# Patient Record
Sex: MALE | Race: WHITE | NOT HISPANIC OR LATINO | Employment: OTHER | URBAN - METROPOLITAN AREA
[De-identification: names, ages, dates, MRNs, and addresses within clinical notes are randomized per-mention and may not be internally consistent; named-entity substitution may affect disease eponyms.]

---

## 2018-06-08 RX ORDER — IODOQUINOL, HYDROCORTISONE ACETATE AND ALOE VERA LEAF 10; 20; 10 MG/G; MG/G; MG/G
GEL TOPICAL
COMMUNITY
Start: 2009-11-03 | End: 2019-03-05

## 2018-06-13 ENCOUNTER — OFFICE VISIT (OUTPATIENT)
Dept: UROLOGY | Facility: MEDICAL CENTER | Age: 73
End: 2018-06-13
Payer: MEDICARE

## 2018-06-13 VITALS
BODY MASS INDEX: 23.38 KG/M2 | HEIGHT: 77 IN | SYSTOLIC BLOOD PRESSURE: 122 MMHG | DIASTOLIC BLOOD PRESSURE: 66 MMHG | WEIGHT: 198 LBS

## 2018-06-13 DIAGNOSIS — N52.03 COMBINED ARTERIAL INSUFFICIENCY AND CORPORO-VENOUS OCCLUSIVE ERECTILE DYSFUNCTION: ICD-10-CM

## 2018-06-13 DIAGNOSIS — N40.1 BPH WITH OBSTRUCTION/LOWER URINARY TRACT SYMPTOMS: Primary | ICD-10-CM

## 2018-06-13 DIAGNOSIS — N13.8 BPH WITH OBSTRUCTION/LOWER URINARY TRACT SYMPTOMS: Primary | ICD-10-CM

## 2018-06-13 LAB
SL AMB  POCT GLUCOSE, UA: NORMAL
SL AMB LEUKOCYTE ESTERASE,UA: NORMAL
SL AMB POCT BILIRUBIN,UA: NORMAL
SL AMB POCT BLOOD,UA: NORMAL
SL AMB POCT CLARITY,UA: CLEAR
SL AMB POCT COLOR,UA: YELLOW
SL AMB POCT KETONES,UA: NORMAL
SL AMB POCT NITRITE,UA: NORMAL
SL AMB POCT PH,UA: 5.5
SL AMB POCT SPECIFIC GRAVITY,UA: 1.03
SL AMB POCT URINE PROTEIN: NORMAL
SL AMB POCT UROBILINOGEN: 0.2

## 2018-06-13 PROCEDURE — 99214 OFFICE O/P EST MOD 30 MIN: CPT | Performed by: UROLOGY

## 2018-06-13 PROCEDURE — 81003 URINALYSIS AUTO W/O SCOPE: CPT | Performed by: UROLOGY

## 2018-06-13 RX ORDER — TAMSULOSIN HYDROCHLORIDE 0.4 MG/1
0.8 CAPSULE ORAL
Qty: 180 CAPSULE | Refills: 3 | Status: SHIPPED | OUTPATIENT
Start: 2018-06-13 | End: 2019-09-06 | Stop reason: SDUPTHER

## 2018-06-13 RX ORDER — FINASTERIDE 5 MG/1
5 TABLET, FILM COATED ORAL DAILY
Qty: 90 TABLET | Refills: 3 | Status: SHIPPED | OUTPATIENT
Start: 2018-06-13 | End: 2019-09-06 | Stop reason: SDUPTHER

## 2018-06-13 NOTE — PROGRESS NOTES
IPSS Questionnaire (AUA-7): Over the past month    1)  How often have you had a sensation of not emptying your bladder completely after you finish urinating? 1 - Less than 1 time in 5   2)  How often have you had to urinate again less than two hours after you finished urinating? 0 - Not at all   3)  How often have you found you stopped and started again several times when you urinated? 0 - Not at all   4) How difficult have you found it to postpone urination? 1 - Less than 1 time in 5   5) How often have you had a weak urinary stream?  1 - Less than 1 time in 5   6) How often have you had to push or strain to begin urination? 0 - Not at all   7) How many times did you most typically get up to urinate from the time you went to bed until the time you got up in the morning?   1 - 1 time   Total Score:  4

## 2018-06-13 NOTE — PATIENT INSTRUCTIONS
Benign Prostatic Hypertrophy   WHAT YOU NEED TO KNOW:   Benign prostatic hypertrophy (BPH) is a condition that causes your prostate gland to grow larger than normal  The prostate gland is the male sex gland that produces a fluid that is part of semen  It is about the size of a walnut and it is located under the bladder  As the prostate grows, it can squeeze the urethra  This can block urine flow and cause urinary problems  DISCHARGE INSTRUCTIONS:   Medicines:   · Alpha blockers: This medicine relaxes the muscles in your prostate and bladder  It may help you urinate more easily  · 5 alpha reductase inhibitors: These medicines block the production of a hormone that causes the prostate to get larger  It may help slow the growth of the prostate or shrink the prostate  · Take your medicine as directed  Contact your healthcare provider if you think your medicine is not helping or if you have side effects  Tell him or her if you are allergic to any medicine  Keep a list of the medicines, vitamins, and herbs you take  Include the amounts, and when and why you take them  Bring the list or the pill bottles to follow-up visits  Carry your medicine list with you in case of an emergency  Follow up with your healthcare provider as directed:  Write down your questions so you remember to ask them during your visits  Manage BPH:   · Do not let your bladder get too full before you empty it  Urinate when you feel the urge  · Limit alcohol  Do not drink large amounts of any liquid at one time  · Decrease the amount of salt you eat  Examples of salty foods are chips, cured meats, and canned soups  Do not use table salt  · Healthcare providers may tell you not to eat spicy foods such as chilli peppers  This may help you find out if spicy food makes your BPH symptoms worse  · You may have sex if you feel well  Contact your healthcare provider if:   · There is a large amount of blood in your urine  · Your signs and symptoms get worse  · You have a fever  · You have questions or concerns about your condition or care  Seek care immediately if:   · You are unable to urinate  · Your bladder feels very full and painful  © 2017 [employer] Information is for End User's use only and may not be sold, redistributed or otherwise used for commercial purposes  All illustrations and images included in CareNotes® are the copyrighted property of [employer]  or [employer]  The above information is an  only  It is not intended as medical advice for individual conditions or treatments  Talk to your doctor, nurse or pharmacist before following any medical regimen to see if it is safe and effective for you

## 2018-06-13 NOTE — PROGRESS NOTES
Assessment/Plan:    Combined arterial insufficiency and corporo-venous occlusive erectile dysfunction  Patient is satisfied with the use of Viagra  BPH with obstruction/lower urinary tract symptoms  AUA symptom score is 4  Urinalysis is negative  PSA 2 52 in April, 2018  He is voiding well on combined medical therapy  His prescriptions were refilled  He will return in 1 year and we will recheck PSA at that time  Diagnoses and all orders for this visit:    BPH with obstruction/lower urinary tract symptoms  -     POCT urine dip auto non-scope  -     tamsulosin (FLOMAX) 0 4 mg; Take 2 capsules (0 8 mg total) by mouth daily with dinner  -     finasteride (PROSCAR) 5 mg tablet; Take 1 tablet (5 mg total) by mouth daily  -     PSA Total, Diagnostic; Future    Combined arterial insufficiency and corporo-venous occlusive erectile dysfunction    Other orders  -     Iodoquinol-HC-Aloe Polysacch (ALCORTIN A) 1-2-1 % GEL; Subjective:      Patient ID: Nixon Dozier  is a 68 y o  male  Benign Prostatic Hypertrophy   This is a chronic problem  The current episode started more than 1 year ago  The problem is unchanged  Irritative symptoms do not include frequency, nocturia or urgency  Obstructive symptoms do not include dribbling, incomplete emptying, an intermittent stream, a slower stream, straining or a weak stream  Pertinent negatives include no chills, dysuria, genital pain, hematuria, hesitancy, nausea or vomiting  AUA score is 0-7  He is sexually active  Nothing aggravates the symptoms  Past treatments include finasteride and tamsulosin  The treatment provided significant relief  He has been using treatment for 2 or more years         The following portions of the patient's history were reviewed and updated as appropriate: allergies, current medications, past family history, past medical history, past social history, past surgical history and problem list     Review of Systems   Constitutional: Negative for chills, diaphoresis, fatigue and fever  HENT: Negative  Eyes: Negative  Respiratory: Negative  Cardiovascular: Negative  Gastrointestinal: Negative for nausea and vomiting  Endocrine: Negative  Genitourinary: Negative for dysuria, frequency, hematuria, hesitancy, incomplete emptying, nocturia and urgency  Musculoskeletal: Negative  Skin: Negative  Allergic/Immunologic: Negative  Neurological: Negative  Hematological: Negative  Psychiatric/Behavioral: Negative  Objective:      /66 (BP Location: Left arm, Patient Position: Sitting)   Ht 6' 5" (1 956 m)   Wt 89 8 kg (198 lb)   BMI 23 48 kg/m²          Physical Exam   Constitutional: He is oriented to person, place, and time  He appears well-developed and well-nourished  HENT:   Head: Normocephalic and atraumatic  Eyes: Conjunctivae are normal    Neck: Neck supple  Cardiovascular: Normal rate  Pulmonary/Chest: Effort normal    Abdominal: Soft  Bowel sounds are normal  He exhibits no distension and no mass  There is no tenderness  There is no rebound, no guarding and no CVA tenderness  Hernia confirmed negative in the right inguinal area and confirmed negative in the left inguinal area  No hernia   Genitourinary: Rectum normal, testes normal and penis normal  Prostate is enlarged  Prostate is not tender  Right testis shows no mass  Left testis shows no mass  No phimosis or hypospadias  Genitourinary Comments: Prostate is 11/2 times enlarged and palpably benign   Musculoskeletal: He exhibits no edema  Neurological: He is alert and oriented to person, place, and time  Skin: Skin is warm and dry  Psychiatric: He has a normal mood and affect  His behavior is normal  Judgment and thought content normal    Nursing note and vitals reviewed

## 2018-06-13 NOTE — ASSESSMENT & PLAN NOTE
AUA symptom score is 4  Urinalysis is negative  PSA 2 52 in April, 2018  He is voiding well on combined medical therapy  His prescriptions were refilled  He will return in 1 year and we will recheck PSA at that time

## 2018-06-13 NOTE — LETTER
June 13, 2018     Adriana Mathew MD  [address]    Patient: Wally Zamorano  YOB: 1945   Date of Visit: 6/13/2018       Dear Dr Jessica Pena:    Thank you for referring Cedric Vargas to me for evaluation  Below are my notes for this consultation  If you have questions, please do not hesitate to call me  I look forward to following your patient along with you           Sincerely,        Obedchandana Salazar MD        CC: No Recipients

## 2019-03-05 ENCOUNTER — OFFICE VISIT (OUTPATIENT)
Dept: FAMILY MEDICINE CLINIC | Facility: CLINIC | Age: 74
End: 2019-03-05
Payer: MEDICARE

## 2019-03-05 VITALS
BODY MASS INDEX: 22.32 KG/M2 | HEIGHT: 77 IN | TEMPERATURE: 97.9 F | SYSTOLIC BLOOD PRESSURE: 146 MMHG | WEIGHT: 189 LBS | DIASTOLIC BLOOD PRESSURE: 78 MMHG | OXYGEN SATURATION: 99 % | HEART RATE: 76 BPM | RESPIRATION RATE: 18 BRPM

## 2019-03-05 DIAGNOSIS — N40.1 BPH WITH OBSTRUCTION/LOWER URINARY TRACT SYMPTOMS: Primary | ICD-10-CM

## 2019-03-05 DIAGNOSIS — N13.8 BPH WITH OBSTRUCTION/LOWER URINARY TRACT SYMPTOMS: Primary | ICD-10-CM

## 2019-03-05 DIAGNOSIS — K64.8 OTHER HEMORRHOIDS: ICD-10-CM

## 2019-03-05 PROCEDURE — 99214 OFFICE O/P EST MOD 30 MIN: CPT | Performed by: FAMILY MEDICINE

## 2019-03-05 NOTE — PROGRESS NOTES
Assessment/Plan:    No problem-specific Assessment & Plan notes found for this encounter  There are no diagnoses linked to this encounter  Subjective:      Patient ID: Huber Dominguez is a 68 y o  male  PATIENT RETURNS FOR FOLLOW-UP OF CHRONIC MEDICAL CONDITIONS  NO HOSPITAL STAYS OR EMERGENCY VISITS RECENTLY  MEDS WERE REVIEWED AND NO SIDE EFFECTS  NO NEW ISSUES  UNLESS NOTED BELOW  NO NEW MEDICAL PROVIDER REPORTED  Recent hemorrhoid excision / gets reg eye ck ; yrly Uro   Ck ; Derm [facility]     Patient treated recently at outpatient clinic for pneumonia the subsequent chest x-ray showed improving but not totally resolved infiltrates  The following portions of the patient's history were reviewed and updated as appropriate: allergies, current medications, past family history, past medical history, past social history, past surgical history and problem list     Review of Systems   Constitutional: Negative for activity change and appetite change  HENT: Negative for trouble swallowing  Eyes: Negative for visual disturbance  Respiratory: Negative for cough and shortness of breath  Cardiovascular: Negative for chest pain, palpitations and leg swelling  Gastrointestinal: Negative for abdominal pain and blood in stool  Endocrine: Negative for polyuria  Genitourinary: Negative for difficulty urinating and hematuria  Skin: Negative for rash  Neurological: Negative for dizziness  Psychiatric/Behavioral: Negative for behavioral problems  Objective:  Vitals:    03/05/19 1434   BP: 146/78   BP Location: Left arm   Patient Position: Sitting   Cuff Size: Standard   Pulse: 76   Resp: 18   Temp: 97 9 °F (36 6 °C)   SpO2: 99%   Weight: 85 7 kg (189 lb)   Height: 6' 5" (1 956 m)      Physical Exam   Constitutional: He appears well-developed and well-nourished  HENT:   Head: Normocephalic and atraumatic  Eyes: Conjunctivae are normal    Neck: Neck supple   No thyromegaly present  Cardiovascular: Normal rate, regular rhythm, normal heart sounds and intact distal pulses  No murmur heard  Pulmonary/Chest: Effort normal and breath sounds normal  No respiratory distress  Musculoskeletal: He exhibits no edema  Lymphadenopathy:     He has no cervical adenopathy  Skin: Skin is warm and dry  Psychiatric: He has a normal mood and affect  His behavior is normal          Patient's chronic problems that were reviewed today are stable  Meds reviewed and no changes made  Appropriate labs and imaging were ordered  Preventive measures appropriate for age and sex were reviewed with patient  Immunizations were updated as appropriate

## 2019-03-05 NOTE — PATIENT INSTRUCTIONS
Call your colonoscopy doctor and find out the date of your last exam     KEEP ALCOHOL TO THE FOLLOWING SAFE LIMITS:   MEN : 2 DRINKS PER DAY : LIMIT 14 PER WEEK   WOMEN: 1 DRINK PER DAY : LIMIT 7 PER WEEK  A"DRINK" = 12 OZ OF REGULAR BEER; 5 OZ OF WINE OR 1 5 OZ [employer] TIPS ON EXERCISE:    IF YOU HAVE CONDITIONS THAT PREVENT AEROBIC EXERCISE:    WALK 30 MINUTES AT LEAST 5 DAYS PER WEEK; MAY BREAK IT UP INTO 10-  15 MINUTE SESSIONS   GET SOME RESISTANCE EXERCISES USING THE MAJOR MUSCLE GROUPS 2-3 TIMES PER WEEK     IF YOU ARE PHYSICALLY ABLE:    TRY TO GET 10,000 STEPS 3 TIMES PER WEEK  PLUS  GO "ONLINE" TO CHECK TARGET HEART RATE FOR YOUR AGE AND DO AEROBIC EXERCISES (JOG/STATIONARY BIKE/ELLIPTICAL) FOR 20-30 MINUTES 2-3 TIMES PER WEEK  After November of this year you should update her tetanus shot I recommend an adult DT  That can be secured at the pharmacy  WE RECOMMEND GETTING THE 2- DOSE SHINGLES VACCINE ([address] [place], [place] [address] [place]) FROM YOUR PHARMACY  CHECK WITH THEM ON THE COST  YOU SHOULD HAVE THIS IF OVER AGE 48, EVEN IF YOU HAVE HAD THE ORIGINAL VACCINE (ZOSTRIX)  PROPER BLOOD PRESSURE MEASUREMENTS INCLUDE:     SIT IN A STRAIGHT BACK CHAIR WITH YOUR FEET ON THE FLOOR   SIT AT LEAST 5 MINUTES BEFORE CHECKING BP   USE A CUFF THAT IS ABOVE THE ELBOW AND AUTOMATIC   "[employer]RON" IS A GOOD BRAND : [facility]/ [facility] HAVE THEM    RECORD BP 3-4 TIMES PER WEEK AND BRING TO NEXT OFFICE VISIT  Target < 145/90   Pneumovax 23 should be secured at the pharmacy in the next month or 2

## 2019-03-18 ENCOUNTER — HOSPITAL ENCOUNTER (OUTPATIENT)
Dept: RADIOLOGY | Facility: HOSPITAL | Age: 74
Discharge: HOME | End: 2019-03-18
Payer: MEDICARE

## 2019-03-18 DIAGNOSIS — N40.1 BPH WITH OBSTRUCTION/LOWER URINARY TRACT SYMPTOMS: ICD-10-CM

## 2019-03-18 DIAGNOSIS — N13.8 BPH WITH OBSTRUCTION/LOWER URINARY TRACT SYMPTOMS: ICD-10-CM

## 2019-03-18 PROCEDURE — 71046 X-RAY EXAM CHEST 2 VIEWS: CPT

## 2019-07-03 ENCOUNTER — OFFICE VISIT (OUTPATIENT)
Dept: FAMILY MEDICINE CLINIC | Facility: CLINIC | Age: 74
End: 2019-07-03
Payer: MEDICARE

## 2019-07-03 VITALS
RESPIRATION RATE: 18 BRPM | HEIGHT: 76 IN | BODY MASS INDEX: 22.2 KG/M2 | HEART RATE: 81 BPM | WEIGHT: 182.3 LBS | TEMPERATURE: 98 F | DIASTOLIC BLOOD PRESSURE: 84 MMHG | OXYGEN SATURATION: 96 % | SYSTOLIC BLOOD PRESSURE: 128 MMHG

## 2019-07-03 DIAGNOSIS — A60.01 HERPES SIMPLEX INFECTION OF PENIS: ICD-10-CM

## 2019-07-03 DIAGNOSIS — R05.9 COUGH: Primary | ICD-10-CM

## 2019-07-03 PROCEDURE — 99214 OFFICE O/P EST MOD 30 MIN: CPT | Performed by: FAMILY MEDICINE

## 2019-07-03 RX ORDER — VALACYCLOVIR HYDROCHLORIDE 500 MG/1
500 TABLET, FILM COATED ORAL 2 TIMES DAILY
Qty: 6 TABLET | Refills: 3 | Status: SHIPPED | OUTPATIENT
Start: 2019-07-03 | End: 2019-10-03

## 2019-07-03 NOTE — ASSESSMENT & PLAN NOTE
Outbreaks sporadically, over the past 30 years  Sometimes can skip a couple years and sometimes can have it twice a year  This episode is different from the others because it has been a week and the blisters to not look like they are starting to dry up as they usually do  Uses no medication for these outbreaks  Discussed with him the use of Valtrex for breakouts, including this 1

## 2019-07-03 NOTE — PATIENT INSTRUCTIONS
When you feel an outbreak starting you should start to the Valtrex at 500 mg twice a day for 3 days  Genital Herpes Simplex  AMBULATORY CARE:   Genital herpes  is a sexually transmitted infection (STI) that is caused by the herpes simplex virus (HSV)  It is spread through oral, vaginal, or anal sex  It may be spread even if you do not see blisters  It can also be spread to other areas of your body, including your eyes, by touching open blisters  If you are pregnant, it may be spread to your baby while he is still in your womb or during vaginal delivery  Unprotected sex or sex with multiple partners increases your risk for genital herpes  Common symptoms include the following: The most common symptoms are blisters that appear on your genital area, thighs, or buttocks  The blisters will open, leak fluid, and then dry up (crust over)  Usually these sores will go away without leaving a scar  Other symptoms may include any of the following:  · Redness, burning, itching, or tingling in your genital area    · Fever or chills    · Headache, body weakness, or muscle pains    · Swollen lymph nodes in your groin    · Sore throat or loss of appetite    · Fluid or blood leaking from your vagina    · Pain when you urinate  Call 911 for any of the following:   · You have trouble breathing  · You have a seizure  · Your neck is stiff  · You have trouble thinking clearly  Contact your healthcare provider if:   · You have chills or a fever  · You have painful blisters on your penis, vagina, anus, or mouth  · Fluid or blood is coming out of your genitals  · You have trouble urinating  · You think you are pregnant and you are bleeding from your vagina  · You have trouble chewing or swallowing  · Your symptoms do not get better, or they get worse, even after treatment  · You have questions or concerns about your condition or care  Medicines: There is no cure for genital herpes   You may need any of the following:  · Antivirals  may help decrease your symptoms  · Numbing cream or ointment  may help decrease pain  · NSAIDs , such as ibuprofen, help decrease swelling, pain, and fever  This medicine is available with or without a doctor's order  NSAIDs can cause stomach bleeding or kidney problems in certain people  If you take blood thinner medicine, always ask your healthcare provider if NSAIDs are safe for you  Always read the medicine label and follow directions  Manage your symptoms:  Do the following to be more comfortable when your infection is active:  · Keep the blisters clean and dry  Wash them with soap and warm water, and pat dry gently  · Wear cotton underwear and loose clothing  This may help to keep the blisters dry and keep clothes from rubbing  · Apply ice  on the area for 15 to 20 minutes every hour or as directed  Use an ice pack, or put crushed ice in a plastic bag  Cover it with a towel  Ice helps prevent tissue damage and decreases swelling and pain  · Apply heat  on the area for 20 to 30 minutes every 2 hours for as many days as directed  A warm bath may also help  Heat helps decrease pain and muscle spasms  Prevent the spread of genital herpes:   · Use condoms  Use a latex condom when you have oral, genital, and anal sex  Use a new condom each time  Use a polyurethane condom if you are allergic to latex  · Try not to touch your blisters  Wash your hands before and after you touch the area  Do not kiss anyone if you have blisters around your mouth  Do not breastfeed if you have blisters on your breast      · Tell your partners  that you have genital herpes  Do not have sex until he or she knows that you have genital herpes  Ask your healthcare provider for ways to tell partners about your infection  · Tell your healthcare providers  that you have genital herpes  If you are pregnant, your baby may need special monitoring   Inform your healthcare provider of your condition to avoid spreading the infection to your baby  Follow up with your healthcare provider as directed:  Write down your questions so you remember to ask them during your visits  © 2017 [employer] Information is for End User's use only and may not be sold, redistributed or otherwise used for commercial purposes  All illustrations and images included in CareNotes® are the copyrighted property of [employer] M [employer]  or [employer]emi [employer]  The above information is an  only  It is not intended as medical advice for individual conditions or treatments  Talk to your doctor, nurse or pharmacist before following any medical regimen to see if it is safe and effective for you

## 2019-07-03 NOTE — ASSESSMENT & PLAN NOTE
Over the past 3-4 months  No change  More in the morning and brings up clear mucous  No fever or chills  Treated for pneumonia in 12/2018  Quit smoking 50 years ago and only smoked for 5 years and at the worst was a carton a week  Denies symptoms of heartburn or GERD  May have some post nasal drip  See ROS  Because of the prolonged cough and the fact that chest x-ray from 03/2019 did not show complete resolution of the LLL infiltrate, we will get a contrast CT scan of the chest and call him with the result

## 2019-07-03 NOTE — PROGRESS NOTES
Assessment/Plan:    Cough  Over the past 3-4 months  No change  More in the morning and brings up clear mucous  No fever or chills  Treated for pneumonia in 12/2018  Quit smoking 50 years ago and only smoked for 5 years and at the worst was a carton a week  Denies symptoms of heartburn or GERD  May have some post nasal drip  See ROS  Because of the prolonged cough and the fact that chest x-ray from 03/2019 did not show complete resolution of the LLL infiltrate, we will get a contrast CT scan of the chest and call him with the result  Herpes simplex infection of penis  Outbreaks sporadically, over the past 30 years  Sometimes can skip a couple years and sometimes can have it twice a year  This episode is different from the others because it has been a week and the blisters to not look like they are starting to dry up as they usually do  Uses no medication for these outbreaks  Discussed with him the use of Valtrex for breakouts, including this 1  Diagnoses and all orders for this visit:    Cough  Comments:  Over the past 3-4 months  No change  More in the morning and brings up clear mucous  No fever or chills  Treated for pneumonia in 12/2018  Orders:  -     CT chest w contrast; Future  -     Basic metabolic panel; Future    Herpes simplex infection of penis  -     valACYclovir (VALTREX) 500 mg tablet; Take 1 tablet (500 mg total) by mouth 2 (two) times a day for 3 days  -     Basic metabolic panel; Future          Subjective:     Chief Complaint   Patient presents with    Cough        Patient ID: Lisa Jimenez is a 69 y o  male  HPI : genital Herpes and persistent cough      The following portions of the patient's history were reviewed and updated as appropriate: allergies, current medications, past family history, past medical history, past social history, past surgical history and problem list     Review of Systems   Constitutional: Negative for activity change, appetite change, fatigue, fever and unexpected weight change  HENT: Negative for congestion, dental problem and sneezing  No history of Spring allergies but is moving and having increased dust exposure and has shedding [place] trees  Eyes: Negative for discharge and visual disturbance  Respiratory: Negative for cough and wheezing  Cardiovascular: Negative for chest pain, palpitations and leg swelling  Gastrointestinal: Negative for abdominal pain, constipation, diarrhea, nausea and vomiting  No heartburn or GERD symptoms and no acid feeling in his throat in the morning  Endocrine: Negative for polydipsia and polyuria  Genitourinary: Negative for dysuria and frequency  Musculoskeletal: Negative for arthralgias  Skin: Negative for rash  Allergic/Immunologic: Negative for environmental allergies and food allergies  Neurological: Negative for headaches  Hematological: Negative for adenopathy  Psychiatric/Behavioral: Negative for behavioral problems and sleep disturbance  Objective:  Vitals:    07/03/19 1444   BP: 128/84   BP Location: Left arm   Patient Position: Sitting   Cuff Size: Standard   Pulse: 81   Resp: 18   Temp: 98 °F (36 7 °C)   TempSrc: Temporal   SpO2: 96%   Weight: 82 7 kg (182 lb 4 8 oz)   Height: 6' 4" (1 93 m)      Physical Exam   Constitutional: He is oriented to person, place, and time  He appears well-developed and well-nourished  Has lost 7 lb over the last 4 months but is in the process of moving and doing a lot of steps  HENT:   Head: Normocephalic  Nose: Nose normal    Mouth/Throat: Oropharynx is clear and moist    Eyes: Conjunctivae are normal  Right eye exhibits no discharge  Left eye exhibits no discharge  Neck: Normal range of motion  Neck supple  No thyromegaly present  Cardiovascular: Normal rate, regular rhythm and normal heart sounds  No murmur heard  Pulmonary/Chest: Effort normal and breath sounds normal  No respiratory distress   He has no wheezes  He has no rales  Abdominal: Soft  Bowel sounds are normal  There is no tenderness  Musculoskeletal: Normal range of motion  Lymphadenopathy:     He has no cervical adenopathy  Neurological: He is alert and oriented to person, place, and time  Skin: Skin is warm  No rash noted  Psychiatric: He has a normal mood and affect   His behavior is normal  Judgment and thought content normal

## 2019-07-05 ENCOUNTER — APPOINTMENT (OUTPATIENT)
Dept: LAB | Facility: HOSPITAL | Age: 74
End: 2019-07-05
Payer: MEDICARE

## 2019-07-05 DIAGNOSIS — R05.9 COUGH: ICD-10-CM

## 2019-07-05 DIAGNOSIS — A60.01 HERPES SIMPLEX INFECTION OF PENIS: ICD-10-CM

## 2019-07-05 LAB
ANION GAP SERPL CALCULATED.3IONS-SCNC: 4 MMOL/L (ref 4–13)
BUN SERPL-MCNC: 20 MG/DL (ref 5–25)
CALCIUM SERPL-MCNC: 9 MG/DL (ref 8.3–10.1)
CHLORIDE SERPL-SCNC: 105 MMOL/L (ref 100–108)
CO2 SERPL-SCNC: 31 MMOL/L (ref 21–32)
CREAT SERPL-MCNC: 0.8 MG/DL (ref 0.6–1.3)
GFR SERPL CREATININE-BSD FRML MDRD: 89 ML/MIN/1.73SQ M
GLUCOSE P FAST SERPL-MCNC: 91 MG/DL (ref 65–99)
POTASSIUM SERPL-SCNC: 4.2 MMOL/L (ref 3.5–5.3)
SODIUM SERPL-SCNC: 140 MMOL/L (ref 136–145)

## 2019-07-05 PROCEDURE — 36415 COLL VENOUS BLD VENIPUNCTURE: CPT

## 2019-07-05 PROCEDURE — 80048 BASIC METABOLIC PNL TOTAL CA: CPT

## 2019-07-10 ENCOUNTER — HOSPITAL ENCOUNTER (OUTPATIENT)
Dept: CT IMAGING | Facility: HOSPITAL | Age: 74
Discharge: HOME | End: 2019-07-10
Payer: MEDICARE

## 2019-07-10 DIAGNOSIS — R05.9 COUGH: ICD-10-CM

## 2019-07-10 PROCEDURE — 71260 CT THORAX DX C+: CPT

## 2019-07-10 RX ADMIN — IOHEXOL 85 ML: 350 INJECTION, SOLUTION INTRAVENOUS at 18:26

## 2019-07-15 ENCOUNTER — TREATMENT (OUTPATIENT)
Dept: FAMILY MEDICINE CLINIC | Facility: CLINIC | Age: 74
End: 2019-07-15

## 2019-07-15 PROBLEM — R93.89 ABNORMAL CT OF THE CHEST: Chronic | Status: ACTIVE | Noted: 2019-07-15

## 2019-08-27 ENCOUNTER — TELEPHONE (OUTPATIENT)
Dept: UROLOGY | Facility: MEDICAL CENTER | Age: 74
End: 2019-08-27

## 2019-08-27 DIAGNOSIS — N13.8 BPH WITH OBSTRUCTION/LOWER URINARY TRACT SYMPTOMS: Primary | ICD-10-CM

## 2019-08-27 DIAGNOSIS — N40.1 BPH WITH OBSTRUCTION/LOWER URINARY TRACT SYMPTOMS: Primary | ICD-10-CM

## 2019-08-27 NOTE — TELEPHONE ENCOUNTER
Patient of Dr Allegra Sanchez seen in the [facility]  Patient is requesting an updated order for his PSA  Patient requested it be emailed to [email or web address]  Please advise

## 2019-09-06 DIAGNOSIS — N40.1 BPH WITH OBSTRUCTION/LOWER URINARY TRACT SYMPTOMS: ICD-10-CM

## 2019-09-06 DIAGNOSIS — N13.8 BPH WITH OBSTRUCTION/LOWER URINARY TRACT SYMPTOMS: ICD-10-CM

## 2019-09-06 RX ORDER — TAMSULOSIN HYDROCHLORIDE 0.4 MG/1
0.8 CAPSULE ORAL
Qty: 180 CAPSULE | Refills: 3 | Status: SHIPPED | OUTPATIENT
Start: 2019-09-06 | End: 2019-10-03 | Stop reason: SDUPTHER

## 2019-09-06 RX ORDER — FINASTERIDE 5 MG/1
TABLET, FILM COATED ORAL
Qty: 90 TABLET | Refills: 3 | Status: SHIPPED | OUTPATIENT
Start: 2019-09-06 | End: 2019-10-03 | Stop reason: SDUPTHER

## 2019-10-02 PROBLEM — Z12.5 PROSTATE CANCER SCREENING: Status: ACTIVE | Noted: 2019-10-02

## 2019-10-02 NOTE — PROGRESS NOTES
10/3/2019      Chief Complaint   Patient presents with    Benign Prostatic Hypertrophy       Assessment and Plan    69 y o  male managed by Dr Silvana Gonsales    1  BPH  - continue tamsulosin 0 8 mg nightly  - continue Proscar 5 mg daily    2  ED  - previously on Viagra, defers refills     3  Prostate cancer screening  - PSA 2 43/4 86 (8/28/19), 2 52/5 04 (4/23/18)  - DOMINIQUE with no nodules   - will continue until age 77 given the patient good health status     FU 1 year with PSA prior and DOMINIQUE at visit       History of Present Illness  Selma Barrett Sr  is a 69 y o  male here for follow up evaluation of BPH, erectile dysfunction, prostate cancer screening  The patient presents today doing very well  He is currently on tamsulosin 0 8 mg nightly and Proscar 5 mg daily  He has no urinary complaints  He was previously on Viagra but states that he no longer requires this medication  His most recent PSA stable at 2 43  Review of Systems   Constitutional: Negative for activity change, chills and fever  Gastrointestinal: Negative for abdominal distention and abdominal pain  Musculoskeletal: Negative for back pain and gait problem  Psychiatric/Behavioral: Negative for behavioral problems and confusion  Urinary Incontinence Screening      Most Recent Value   Urinary Incontinence   Urinary Incontinence? No   Incomplete emptying? No   Urinary frequency? No   Urinary urgency? No   Urinary hesitancy? No   Dysuria (painful difficult urination)? No   Nocturia (waking up to use the bathroom)? Yes [once or twice a night]   Straining (having to push to go)? No   Weak stream?  Yes ["Weaker than it used to be, but still good"]   Intermittent stream?  No   Post void dribbling?   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     Erectile dysfunction        Past Social History  Past Surgical History:   Procedure Laterality Date    COLONOSCOPY      SKIN BIOPSY       Social History     Tobacco Use   Smoking Status Former Smoker   Smokeless Tobacco Never Used       Past Family History  Family History   Family history unknown: Yes       Past Social history  Social History     Socioeconomic History    Marital status: /Civil Union     Spouse name: Not on file    Number of children: Not on file    Years of education: Not on file    Highest education level: Not on file   Occupational History    Not on file   Social Needs    Financial resource strain: Not on file    Food insecurity:     Worry: Not on file     Inability: Not on file    Transportation needs:     Medical: Not on file     Non-medical: Not on file   Tobacco Use    Smoking status: Former Smoker    Smokeless tobacco: Never Used   Substance and Sexual Activity    Alcohol use:  Yes     Alcohol/week: 12 0 standard drinks     Types: 12 Cans of beer per week     Comment: no caffeine use    Drug use: No    Sexual activity: Not on file   Lifestyle    Physical activity:     Days per week: Not on file     Minutes per session: Not on file    Stress: Not on file   Relationships    Social connections:     Talks on phone: Not on file     Gets together: Not on file     Attends [employer] service: Not on file     Active member of club or organization: Not on file     Attends meetings of clubs or organizations: Not on file     Relationship status: Not on file    Intimate partner violence:     Fear of current or ex partner: Not on file     Emotionally abused: Not on file     Physically abused: Not on file     Forced sexual activity: Not on file   Other Topics Concern    Not on file   Social History Narrative    Not on file       Current Medications  Current Outpatient Medications   Medication Sig Dispense Refill    cycloSPORINE (RESTASIS) 0 05 % ophthalmic emulsion Apply 1 drop to eye 2 (two) times a day      finasteride (PROSCAR) 5 mg tablet TAKE 1 TABLET BY MOUTH EVERY DAY 90 tablet 3    Multiple Vitamin (MULTI-DAY VITAMINS) TABS Take 1 tablet by mouth daily      tamsulosin (FLOMAX) 0 4 mg TAKE 2 CAPSULES (0 8 MG TOTAL) BY MOUTH DAILY WITH DINNER 180 capsule 3     No current facility-administered medications for this visit  Allergies  Allergies   Allergen Reactions    No Known Allergies     Other          The following portions of the patient's history were reviewed and updated as appropriate: allergies, current medications, past medical history, past social history, past surgical history and problem list       Vitals  Vitals:    10/03/19 1354   BP: 118/66   BP Location: Left arm   Patient Position: Sitting   Cuff Size: Standard   Pulse: 74   Weight: 85 7 kg (189 lb)   Height: 6' 4" (1 93 m)       Physical Exam  Constitutional   General appearance: Patient is seated and in no acute distress, well appearing and well nourished  Head and Face   Head and face: Normal     Eyes   Conjunctiva and lids: No erythema, swelling or discharge  Ears, Nose, Mouth, and Throat   Hearing: Normal     Pulmonary   Respiratory effort: No increased work of breathing or signs of respiratory distress  Cardiovascular   Examination of extremities for edema and/or varicosities: Normal     Abdomen   Abdomen: Non-tender, no masses  Genitourinary   Digital rectal exam of prostate:   Smooth, nontender, 35 g prostate with no nodules  Musculoskeletal   Gait and station: Normal     Skin   Skin and subcutaneous tissue: Warm, dry, and intact  No visible lesions or rashes  Psychiatric   Judgment and insight: Normal  Recent and remote memory:  Normal  Mood and affect: Normal      Results  No results found for this or any previous visit (from the past 1 hour(s))  ]  No results found for: PSA  Lab Results   Component Value Date    CALCIUM 9 0 07/05/2019    K 4 2 07/05/2019    CO2 31 07/05/2019     07/05/2019 BUN 20 07/05/2019    CREATININE 0 80 07/05/2019     No results found for: WBC, HGB, HCT, MCV, PLT      Orders  No orders of the defined types were placed in this encounter

## 2019-10-03 ENCOUNTER — OFFICE VISIT (OUTPATIENT)
Dept: UROLOGY | Facility: MEDICAL CENTER | Age: 74
End: 2019-10-03
Payer: MEDICARE

## 2019-10-03 VITALS
SYSTOLIC BLOOD PRESSURE: 118 MMHG | BODY MASS INDEX: 23.02 KG/M2 | HEIGHT: 76 IN | DIASTOLIC BLOOD PRESSURE: 66 MMHG | HEART RATE: 74 BPM | WEIGHT: 189 LBS

## 2019-10-03 DIAGNOSIS — Z12.5 PROSTATE CANCER SCREENING: ICD-10-CM

## 2019-10-03 DIAGNOSIS — N52.03 COMBINED ARTERIAL INSUFFICIENCY AND CORPORO-VENOUS OCCLUSIVE ERECTILE DYSFUNCTION: Primary | ICD-10-CM

## 2019-10-03 DIAGNOSIS — N40.1 BPH WITH OBSTRUCTION/LOWER URINARY TRACT SYMPTOMS: ICD-10-CM

## 2019-10-03 DIAGNOSIS — N13.8 BPH WITH OBSTRUCTION/LOWER URINARY TRACT SYMPTOMS: ICD-10-CM

## 2019-10-03 PROCEDURE — 99213 OFFICE O/P EST LOW 20 MIN: CPT | Performed by: PHYSICIAN ASSISTANT

## 2019-10-03 RX ORDER — FINASTERIDE 5 MG/1
5 TABLET, FILM COATED ORAL DAILY
Qty: 90 TABLET | Refills: 3 | Status: SHIPPED | OUTPATIENT
Start: 2019-10-03 | End: 2020-11-04 | Stop reason: SDUPTHER

## 2019-10-03 RX ORDER — TAMSULOSIN HYDROCHLORIDE 0.4 MG/1
0.8 CAPSULE ORAL
Qty: 180 CAPSULE | Refills: 3 | Status: SHIPPED | OUTPATIENT
Start: 2019-10-03 | End: 2020-11-04 | Stop reason: SDUPTHER

## 2020-02-07 ENCOUNTER — TELEPHONE (OUTPATIENT)
Dept: FAMILY MEDICINE CLINIC | Facility: CLINIC | Age: 75
End: 2020-02-07

## 2020-03-08 PROBLEM — I44.4 LEFT ANTERIOR HEMIBLOCK: Status: ACTIVE | Noted: 2020-03-08

## 2020-03-08 PROBLEM — I45.2 BIFASCICULAR BLOCK: Status: ACTIVE | Noted: 2020-03-08

## 2020-06-03 ENCOUNTER — TELEPHONE (OUTPATIENT)
Dept: FAMILY MEDICINE CLINIC | Facility: CLINIC | Age: 75
End: 2020-06-03

## 2020-06-03 ENCOUNTER — OFFICE VISIT (OUTPATIENT)
Dept: FAMILY MEDICINE CLINIC | Facility: CLINIC | Age: 75
End: 2020-06-03
Payer: MEDICARE

## 2020-06-03 VITALS
BODY MASS INDEX: 22.75 KG/M2 | WEIGHT: 186.8 LBS | HEART RATE: 72 BPM | SYSTOLIC BLOOD PRESSURE: 140 MMHG | HEIGHT: 76 IN | OXYGEN SATURATION: 98 % | DIASTOLIC BLOOD PRESSURE: 80 MMHG | TEMPERATURE: 95.4 F

## 2020-06-03 DIAGNOSIS — I44.4 LEFT ANTERIOR HEMIBLOCK: ICD-10-CM

## 2020-06-03 DIAGNOSIS — N13.8 BPH WITH OBSTRUCTION/LOWER URINARY TRACT SYMPTOMS: Primary | ICD-10-CM

## 2020-06-03 DIAGNOSIS — M25.519 SHOULDER PAIN, UNSPECIFIED CHRONICITY, UNSPECIFIED LATERALITY: ICD-10-CM

## 2020-06-03 DIAGNOSIS — Z00.00 MEDICARE ANNUAL WELLNESS VISIT, INITIAL: ICD-10-CM

## 2020-06-03 DIAGNOSIS — A60.01 HERPES SIMPLEX INFECTION OF PENIS: ICD-10-CM

## 2020-06-03 DIAGNOSIS — N40.1 BPH WITH OBSTRUCTION/LOWER URINARY TRACT SYMPTOMS: Primary | ICD-10-CM

## 2020-06-03 DIAGNOSIS — K64.8 OTHER HEMORRHOIDS: ICD-10-CM

## 2020-06-03 DIAGNOSIS — R93.89 ABNORMAL CT OF THE CHEST: Chronic | ICD-10-CM

## 2020-06-03 LAB — HCV AB SER QL: NORMAL

## 2020-06-03 PROCEDURE — 36415 COLL VENOUS BLD VENIPUNCTURE: CPT | Performed by: FAMILY MEDICINE

## 2020-06-03 PROCEDURE — 4040F PNEUMOC VAC/ADMIN/RCVD: CPT | Performed by: FAMILY MEDICINE

## 2020-06-03 PROCEDURE — 20610 DRAIN/INJ JOINT/BURSA W/O US: CPT | Performed by: FAMILY MEDICINE

## 2020-06-03 PROCEDURE — 1170F FXNL STATUS ASSESSED: CPT | Performed by: FAMILY MEDICINE

## 2020-06-03 PROCEDURE — 99214 OFFICE O/P EST MOD 30 MIN: CPT | Performed by: FAMILY MEDICINE

## 2020-06-03 PROCEDURE — 1160F RVW MEDS BY RX/DR IN RCRD: CPT | Performed by: FAMILY MEDICINE

## 2020-06-03 PROCEDURE — 1036F TOBACCO NON-USER: CPT | Performed by: FAMILY MEDICINE

## 2020-06-03 PROCEDURE — G0438 PPPS, INITIAL VISIT: HCPCS | Performed by: FAMILY MEDICINE

## 2020-06-03 PROCEDURE — 86803 HEPATITIS C AB TEST: CPT | Performed by: FAMILY MEDICINE

## 2020-06-03 PROCEDURE — 3008F BODY MASS INDEX DOCD: CPT | Performed by: FAMILY MEDICINE

## 2020-06-03 RX ORDER — LIDOCAINE HYDROCHLORIDE 10 MG/ML
2 INJECTION, SOLUTION INFILTRATION; PERINEURAL
Status: COMPLETED | OUTPATIENT
Start: 2020-06-03 | End: 2020-06-03

## 2020-06-03 RX ORDER — TRIAMCINOLONE ACETONIDE 40 MG/ML
20 INJECTION, SUSPENSION INTRA-ARTICULAR; INTRAMUSCULAR
Status: COMPLETED | OUTPATIENT
Start: 2020-06-03 | End: 2020-06-03

## 2020-06-03 RX ADMIN — TRIAMCINOLONE ACETONIDE 20 MG: 40 INJECTION, SUSPENSION INTRA-ARTICULAR; INTRAMUSCULAR at 09:39

## 2020-06-03 RX ADMIN — LIDOCAINE HYDROCHLORIDE 2 ML: 10 INJECTION, SOLUTION INFILTRATION; PERINEURAL at 09:39

## 2020-09-11 ENCOUNTER — APPOINTMENT (OUTPATIENT)
Dept: LAB | Facility: MEDICAL CENTER | Age: 75
End: 2020-09-11
Payer: MEDICARE

## 2020-09-11 DIAGNOSIS — Z12.5 PROSTATE CANCER SCREENING: ICD-10-CM

## 2020-09-11 LAB — PSA SERPL-MCNC: 1.9 NG/ML (ref 0–4)

## 2020-09-11 PROCEDURE — 36415 COLL VENOUS BLD VENIPUNCTURE: CPT

## 2020-09-11 PROCEDURE — G0103 PSA SCREENING: HCPCS

## 2020-10-13 ENCOUNTER — OFFICE VISIT (OUTPATIENT)
Dept: FAMILY MEDICINE CLINIC | Facility: CLINIC | Age: 75
End: 2020-10-13
Payer: MEDICARE

## 2020-10-13 VITALS
SYSTOLIC BLOOD PRESSURE: 136 MMHG | WEIGHT: 195.5 LBS | HEIGHT: 76 IN | OXYGEN SATURATION: 98 % | TEMPERATURE: 97.4 F | HEART RATE: 73 BPM | BODY MASS INDEX: 23.81 KG/M2 | DIASTOLIC BLOOD PRESSURE: 72 MMHG

## 2020-10-13 DIAGNOSIS — M79.604 PAIN OF RIGHT LOWER EXTREMITY: Primary | ICD-10-CM

## 2020-10-13 DIAGNOSIS — Z23 ENCOUNTER FOR IMMUNIZATION: ICD-10-CM

## 2020-10-13 DIAGNOSIS — Z23 IMMUNIZATION DUE: ICD-10-CM

## 2020-10-13 PROCEDURE — 90662 IIV NO PRSV INCREASED AG IM: CPT | Performed by: FAMILY MEDICINE

## 2020-10-13 PROCEDURE — G0008 ADMIN INFLUENZA VIRUS VAC: HCPCS | Performed by: FAMILY MEDICINE

## 2020-10-13 PROCEDURE — 99213 OFFICE O/P EST LOW 20 MIN: CPT | Performed by: FAMILY MEDICINE

## 2020-10-13 RX ORDER — NAPROXEN 375 MG/1
375 TABLET ORAL 2 TIMES DAILY WITH MEALS
Qty: 20 TABLET | Refills: 2 | Status: SHIPPED | OUTPATIENT
Start: 2020-10-13 | End: 2021-01-26 | Stop reason: ALTCHOICE

## 2020-11-03 DIAGNOSIS — N13.8 BPH WITH OBSTRUCTION/LOWER URINARY TRACT SYMPTOMS: ICD-10-CM

## 2020-11-03 DIAGNOSIS — N40.1 BPH WITH OBSTRUCTION/LOWER URINARY TRACT SYMPTOMS: ICD-10-CM

## 2020-11-04 RX ORDER — TAMSULOSIN HYDROCHLORIDE 0.4 MG/1
0.8 CAPSULE ORAL
Qty: 180 CAPSULE | Refills: 0 | Status: SHIPPED | OUTPATIENT
Start: 2020-11-04 | End: 2021-01-26 | Stop reason: SDUPTHER

## 2020-11-04 RX ORDER — FINASTERIDE 5 MG/1
5 TABLET, FILM COATED ORAL DAILY
Qty: 90 TABLET | Refills: 0 | Status: SHIPPED | OUTPATIENT
Start: 2020-11-04 | End: 2020-11-12

## 2020-11-12 DIAGNOSIS — N13.8 BPH WITH OBSTRUCTION/LOWER URINARY TRACT SYMPTOMS: ICD-10-CM

## 2020-11-12 DIAGNOSIS — N40.1 BPH WITH OBSTRUCTION/LOWER URINARY TRACT SYMPTOMS: ICD-10-CM

## 2020-11-12 RX ORDER — FINASTERIDE 5 MG/1
TABLET, FILM COATED ORAL
Qty: 90 TABLET | Refills: 3 | Status: SHIPPED | OUTPATIENT
Start: 2020-11-12 | End: 2021-01-26 | Stop reason: SDUPTHER

## 2021-01-26 ENCOUNTER — OFFICE VISIT (OUTPATIENT)
Dept: UROLOGY | Facility: MEDICAL CENTER | Age: 76
End: 2021-01-26
Payer: MEDICARE

## 2021-01-26 VITALS
HEIGHT: 77 IN | DIASTOLIC BLOOD PRESSURE: 80 MMHG | BODY MASS INDEX: 23.38 KG/M2 | HEART RATE: 70 BPM | WEIGHT: 198 LBS | SYSTOLIC BLOOD PRESSURE: 120 MMHG

## 2021-01-26 DIAGNOSIS — N13.8 BPH WITH OBSTRUCTION/LOWER URINARY TRACT SYMPTOMS: Primary | ICD-10-CM

## 2021-01-26 DIAGNOSIS — N52.03 COMBINED ARTERIAL INSUFFICIENCY AND CORPORO-VENOUS OCCLUSIVE ERECTILE DYSFUNCTION: ICD-10-CM

## 2021-01-26 DIAGNOSIS — N40.1 BPH WITH OBSTRUCTION/LOWER URINARY TRACT SYMPTOMS: Primary | ICD-10-CM

## 2021-01-26 LAB
SL AMB  POCT GLUCOSE, UA: NORMAL
SL AMB LEUKOCYTE ESTERASE,UA: NORMAL
SL AMB POCT BILIRUBIN,UA: NORMAL
SL AMB POCT BLOOD,UA: NORMAL
SL AMB POCT CLARITY,UA: CLEAR
SL AMB POCT COLOR,UA: YELLOW
SL AMB POCT KETONES,UA: NORMAL
SL AMB POCT NITRITE,UA: NORMAL
SL AMB POCT PH,UA: 7
SL AMB POCT SPECIFIC GRAVITY,UA: 1.02
SL AMB POCT URINE PROTEIN: NORMAL
SL AMB POCT UROBILINOGEN: 0.2

## 2021-01-26 PROCEDURE — 81003 URINALYSIS AUTO W/O SCOPE: CPT | Performed by: UROLOGY

## 2021-01-26 PROCEDURE — 99214 OFFICE O/P EST MOD 30 MIN: CPT | Performed by: UROLOGY

## 2021-01-26 RX ORDER — SILDENAFIL 100 MG/1
100 TABLET, FILM COATED ORAL DAILY PRN
Qty: 30 TABLET | Refills: 1 | Status: SHIPPED | OUTPATIENT
Start: 2021-01-26 | End: 2022-02-18

## 2021-01-26 RX ORDER — TAMSULOSIN HYDROCHLORIDE 0.4 MG/1
0.8 CAPSULE ORAL
Qty: 180 CAPSULE | Refills: 3 | Status: SHIPPED | OUTPATIENT
Start: 2021-01-26 | End: 2021-02-01

## 2021-01-26 RX ORDER — FINASTERIDE 5 MG/1
5 TABLET, FILM COATED ORAL DAILY
Qty: 90 TABLET | Refills: 3 | Status: SHIPPED | OUTPATIENT
Start: 2021-01-26 | End: 2022-03-03 | Stop reason: SDUPTHER

## 2021-01-26 NOTE — ASSESSMENT & PLAN NOTE
Viagra is working well and his prescription was refilled  He knows to separate the dosing of Viagra and tamsulosin

## 2021-01-26 NOTE — PROGRESS NOTES
Assessment/Plan:    BPH with obstruction/lower urinary tract symptoms  AUA symptom score is 4 on tamsulosin and finasteride  Urinalysis is negative  PSA was 1 9 in September 2020  We will continue present therapy  His prescriptions were refilled  He will try to decrease the tamsulosin to 1 pill at night  He will return in 1 year  We will plan to recheck a PSA next September  Combined arterial insufficiency and corporo-venous occlusive erectile dysfunction  Viagra is working well and his prescription was refilled  He knows to separate the dosing of Viagra and tamsulosin  Diagnoses and all orders for this visit:    BPH with obstruction/lower urinary tract symptoms  -     POCT urine dip auto non-scope  -     tamsulosin (FLOMAX) 0 4 mg; Take 2 capsules (0 8 mg total) by mouth daily with dinner  -     finasteride (PROSCAR) 5 mg tablet; Take 1 tablet (5 mg total) by mouth daily  -     PSA Total, Diagnostic; Future  -     Urinalysis with microscopic; Future    Combined arterial insufficiency and corporo-venous occlusive erectile dysfunction  -     sildenafil (VIAGRA) 100 mg tablet; Take 1 tablet (100 mg total) by mouth daily as needed for erectile dysfunction          Subjective:      Patient ID: Elsy Flores is a 77 y o  male  Benign Prostatic Hypertrophy  This is a chronic problem  The current episode started more than 1 year ago  The problem is unchanged  Irritative symptoms do not include frequency or urgency (rare)  Nocturia: Nocturia x 1  Obstructive symptoms include a slower stream  Obstructive symptoms do not include dribbling, incomplete emptying, an intermittent stream, straining or a weak stream  Pertinent negatives include no chills, dysuria, genital pain, hematuria or hesitancy  AUA score is 0-7  He is sexually active  Nothing aggravates the symptoms  Past treatments include tamsulosin  The treatment provided significant relief  Erectile Dysfunction  This is a chronic problem   The current episode started more than 1 year ago  The problem is unchanged  The nature of his difficulty is maintaining erection  Irritative symptoms do not include frequency or urgency (rare)  Nocturia: Nocturia x 1  Obstructive symptoms include a slower stream  Obstructive symptoms do not include dribbling, incomplete emptying, an intermittent stream, straining or a weak stream  Pertinent negatives include no chills, dysuria, genital pain, hematuria or hesitancy  Nothing aggravates the symptoms  Past treatments include sildenafil  The treatment provided significant relief  He has been using treatment for 2 or more years  He has had no adverse reactions caused by medications  Risk factors include BPH  The following portions of the patient's history were reviewed and updated as appropriate: allergies, current medications, past family history, past medical history, past social history, past surgical history and problem list     Review of Systems   Constitutional: Negative for chills, diaphoresis, fatigue and fever  HENT: Negative  Eyes: Negative  Respiratory: Negative  Cardiovascular: Negative  Gastrointestinal: Negative  Endocrine: Negative  Genitourinary: Negative for dysuria, frequency, hematuria, hesitancy, incomplete emptying and urgency (rare)  Nocturia: Nocturia x 1         See HPI   Musculoskeletal: Negative  Skin: Negative  Allergic/Immunologic: Negative  Neurological: Negative  Hematological: Negative  Psychiatric/Behavioral: Negative  AUA SYMPTOM SCORE      Most Recent Value   AUA SYMPTOM SCORE   How often have you had a sensation of not emptying your bladder completely after you finished urinating? 0   How often have you had to urinate again less than two hours after you finished urinating? 1   How often have you found you stopped and started again several times when you urinate? 1   How often have you found it difficult to postpone urination?   1   How often have you had a weak urinary stream?  0   How often have you had to push or strain to begin urination? 0   How many times did you most typically get up to urinate from the time you went to bed at night until the time you got up in the morning? 1   Quality of Life: If you were to spend the rest of your life with your urinary condition just the way it is now, how would you feel about that?  2   AUA SYMPTOM SCORE  4        Objective:      /80   Pulse 70   Ht 6' 5" (1 956 m)   Wt 89 8 kg (198 lb)   BMI 23 48 kg/m²          Physical Exam  Vitals signs reviewed  Constitutional:       General: He is not in acute distress  Appearance: Normal appearance  He is well-developed and normal weight  He is not ill-appearing, toxic-appearing or diaphoretic  HENT:      Head: Normocephalic and atraumatic  Eyes:      General: No scleral icterus  Conjunctiva/sclera: Conjunctivae normal    Neck:      Musculoskeletal: Normal range of motion and neck supple  Cardiovascular:      Rate and Rhythm: Normal rate  Pulmonary:      Effort: Pulmonary effort is normal    Abdominal:      General: Bowel sounds are normal  There is no distension  Palpations: Abdomen is soft  There is no mass  Tenderness: There is no abdominal tenderness  There is no right CVA tenderness, left CVA tenderness, guarding or rebound  Hernia: No hernia is present  Genitourinary:     Penis: Normal  No phimosis or hypospadias  Scrotum/Testes: Normal          Right: Mass not present  Left: Mass not present  Rectum: Normal       Comments: Prostate 1-1 5X enlarged and palpably benign  Musculoskeletal: Normal range of motion  Skin:     General: Skin is warm and dry  Neurological:      General: No focal deficit present  Mental Status: He is alert and oriented to person, place, and time  Psychiatric:         Mood and Affect: Mood normal          Behavior: Behavior normal          Thought Content:  Thought content normal          Judgment: Judgment normal

## 2021-01-26 NOTE — ASSESSMENT & PLAN NOTE
AUA symptom score is 4 on tamsulosin and finasteride  Urinalysis is negative  PSA was 1 9 in September 2020  We will continue present therapy  His prescriptions were refilled  He will try to decrease the tamsulosin to 1 pill at night  He will return in 1 year  We will plan to recheck a PSA next September

## 2021-01-26 NOTE — PATIENT INSTRUCTIONS
Enlarged Prostate (BPH)   WHAT YOU NEED TO KNOW:   An enlarged prostate (BPH) develops because the number of prostate cells increases (hyperplasia) or the cells get bigger (hypertrophy)  BPH causes urinary system problems that can get worse over time  You can work with healthcare providers and take steps to manage BPH  DISCHARGE INSTRUCTIONS:   Call your doctor or urologist if:   · You see blood in your urine  · You are not able to urinate  · Your bladder feels very full and painful  · You have new or worsening symptoms  · You have a fever  · You have questions or concerns about your condition or care  Medicines: You may need any of the following:  · Medicines  may be used to relax the muscles in your prostate and bladder  This may help you urinate more easily  Medicines may also be used to block the production of a hormone that causes the prostate to get larger  It may help to slow the growth of the prostate or shrink the prostate  · Diuretics  (water pills) may be used to relieve fluid buildup  You will need to take these in the morning or afternoon because they may cause you to urinate more often  Do not take them before bedtime  · Take your medicine as directed  Contact your healthcare provider if you think your medicine is not helping or if you have side effects  Tell him or her if you are allergic to any medicine  Keep a list of the medicines, vitamins, and herbs you take  Include the amounts, and when and why you take them  Bring the list or the pill bottles to follow-up visits  Carry your medicine list with you in case of an emergency  What you can do to manage BPH:   · Urinate on a regular schedule  This will train your bladder to hold urine longer  A larger amount of urine may make it easier to urinate  · Talk to your healthcare provider about all your medicines  This includes prescription and over-the-counter medicines  Some medicines can make BPH worse   Do not start any new medicines before you talk to your provider  · Drink less liquid during the day  Do not have liquid for several hours before you go to bed at night  Do not drink large amounts of any liquid at one time  · Limit alcohol and caffeine  These can irritate your bladder and make your symptoms worse  · Eat less salt  Salt can cause fluid buildup and make it harder to urinate  Examples of salty foods are chips, cured meats, and canned soups  Do not use table salt  · Elevate your legs if you have swelling  Elevate (raise) your legs above the level of your heart  This can relieve swelling caused by fluid buildup  You may not have to get up in the night to urinate  · Lose weight if you are overweight  Obesity increases your risk for obstructive sleep apnea (ROSALINA)  ROSALINA can make you need to get up in the night to urinate  Exercise can help you reach or maintain a healthy weight  A lack of exercise may make BPH symptoms worse  Aim to get at least 30 minutes of exercise on most days of the week  Follow up with your doctor or urologist as directed:  Write down your questions so you remember to ask them during your visits  © Copyright [address] [place] [address] Drive Information is for End User's use only and may not be sold, redistributed or otherwise used for commercial purposes  All illustrations and images included in CareNotes® are the copyrighted property of [employer]  or [facility] Amrita Dewitt   The above information is an  only  It is not intended as medical advice for individual conditions or treatments  Talk to your doctor, nurse or pharmacist before following any medical regimen to see if it is safe and effective for you

## 2021-02-01 DIAGNOSIS — N13.8 BPH WITH OBSTRUCTION/LOWER URINARY TRACT SYMPTOMS: ICD-10-CM

## 2021-02-01 DIAGNOSIS — N40.1 BPH WITH OBSTRUCTION/LOWER URINARY TRACT SYMPTOMS: ICD-10-CM

## 2021-02-01 RX ORDER — TAMSULOSIN HYDROCHLORIDE 0.4 MG/1
0.8 CAPSULE ORAL
Qty: 180 CAPSULE | Refills: 3 | Status: SHIPPED | OUTPATIENT
Start: 2021-02-01 | End: 2022-03-03 | Stop reason: SDUPTHER

## 2021-03-29 ENCOUNTER — TREATMENT (OUTPATIENT)
Dept: FAMILY MEDICINE CLINIC | Facility: CLINIC | Age: 76
End: 2021-03-29

## 2021-03-29 DIAGNOSIS — M79.604 PAIN OF RIGHT LOWER EXTREMITY: Primary | ICD-10-CM

## 2021-04-21 ENCOUNTER — CONSULT (OUTPATIENT)
Dept: OBGYN CLINIC | Facility: OTHER | Age: 76
End: 2021-04-21
Payer: MEDICARE

## 2021-04-21 VITALS
DIASTOLIC BLOOD PRESSURE: 88 MMHG | HEIGHT: 77 IN | SYSTOLIC BLOOD PRESSURE: 156 MMHG | BODY MASS INDEX: 23.38 KG/M2 | WEIGHT: 198 LBS | HEART RATE: 74 BPM

## 2021-04-21 DIAGNOSIS — M79.604 PAIN OF RIGHT LOWER EXTREMITY: ICD-10-CM

## 2021-04-21 DIAGNOSIS — S76.311A HAMSTRING STRAIN, RIGHT, INITIAL ENCOUNTER: Primary | ICD-10-CM

## 2021-04-21 PROCEDURE — 99203 OFFICE O/P NEW LOW 30 MIN: CPT | Performed by: ORTHOPAEDIC SURGERY

## 2021-04-21 PROCEDURE — 1123F ACP DISCUSS/DSCN MKR DOCD: CPT | Performed by: ORTHOPAEDIC SURGERY

## 2021-04-21 NOTE — PROGRESS NOTES
Orthopaedic Surgery - Office Note  Muna Yañez Sr  (72 y o  male)   : 1945   MRN: [id number]  Encounter Date: 2021    Chief Complaint   Patient presents with    Right Hip - Pain       Assessment / Plan  Right hamstring strain    · Activity as tolerated   · Referral given to begin PT- this will likely resolve his lingering symptoms   · Recommended using heat prior to walking and stretching after exercise  · XRAY NEXT VISIT if patient returns - xray femur   Return if symptoms worsen or fail to improve  History of Present Illness  Muna Yañez Sr  is a 77 y o  male who presents for evaluation of right hamstring pain  He states that for about a year he has been experiencing a pain/sorenss in his right hamstring  He walks 3 miles a day and notices discomfort when walking up a hill  He denies any injury  He was being treated by Dr Margo Rdz, his PCP, he has done a week of rest and naproxen which provided some relief but the discomfort return upon walking again  He has not yet tried PT but has done some stretching on his own  He denies any radiating symptoms  Review of Systems  Pertinent items are noted in HPI  All other systems were reviewed and are negative  Physical Exam  /88   Pulse 74   Ht 6' 5" (1 956 m)   Wt 89 8 kg (198 lb)   BMI 23 48 kg/m²   Cons: Appears well  No apparent distress  Psych: Alert  Oriented x3  Mood and affect normal   Eyes: PERRLA, EOMI  Resp: Normal effort  No audible wheezing or stridor  CV: Palpable pulse  No discernable arrhythmia  No LE edema  Lymph:  No palpable cervical, axillary, or inguinal lymphadenopathy  Skin: Warm  No palpable masses  No visible lesions  Neuro: Normal muscle tone  Normal and symmetric DTR's  Right Lower Extremity Exam  Alignment:  Normal resting hip posture  Normal knee alignment  Inspection:  No swelling  No erythema  No ecchymosis  Palpation:  No tenderness  No effusion  ROM:  Knee Extension 0   Knee Flexion 135   Strength:  Able to SLR without lag  Able to actively extend knee against gravity  Stability:  No objective knee instability  Neurovascular:  Sensation intact in DP/SP/[place]/Sa/T nerve distributions  2+ DP & PT pulses  Gait:  Normal     Studies Reviewed  No studies to review    Procedures  No procedures today  Medical, Surgical, Family, and Social History  The patient's medical history, family history, and social history, were reviewed and updated as appropriate  Past Medical History:   Diagnosis Date    Bifascicular block 3/8/2020    Asx: discovered on Ekg done 3/20 in eval of chest pain   BPH with obstruction/lower urinary tract symptoms     BPH with urinary obstruction     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     Erectile dysfunction        Past Surgical History:   Procedure Laterality Date    COLONOSCOPY      SKIN BIOPSY         Family History   Family history unknown: Yes       Social History     Occupational History    Not on file   Tobacco Use    Smoking status: Former Smoker    Smokeless tobacco: Never Used   Substance and Sexual Activity    Alcohol use:  Yes     Alcohol/week: 12 0 standard drinks     Types: 12 Cans of beer per week     Comment: no caffeine use    Drug use: No    Sexual activity: Not on file       Allergies   Allergen Reactions    No Known Allergies     Other          Current Outpatient Medications:     cycloSPORINE (RESTASIS) 0 05 % ophthalmic emulsion, Apply 1 drop to eye 2 (two) times a day, Disp: , Rfl:     finasteride (PROSCAR) 5 mg tablet, Take 1 tablet (5 mg total) by mouth daily, Disp: 90 tablet, Rfl: 3    Multiple Vitamin (MULTI-DAY VITAMINS) TABS, Take 1 tablet by mouth daily, Disp: , Rfl:     sildenafil (VIAGRA) 100 mg tablet, Take 1 tablet (100 mg total) by mouth daily as needed for erectile dysfunction, Disp: 30 tablet, Rfl: 1    tamsulosin (FLOMAX) 0 4 mg, TAKE 2 CAPSULES (0 8 MG TOTAL) BY MOUTH DAILY WITH DINNER, Disp: 180 capsule, Rfl: 3      [employer]    I,:  Carl Talbot am acting as a scribe while in the presence of the attending physician :       I,:  Gilberto Contreras MD personally performed the services described in this documentation    as scribed in my presence :

## 2021-04-26 ENCOUNTER — EVALUATION (OUTPATIENT)
Dept: PHYSICAL THERAPY | Facility: MEDICAL CENTER | Age: 76
End: 2021-04-26
Payer: MEDICARE

## 2021-04-26 DIAGNOSIS — M79.604 PAIN OF RIGHT LOWER EXTREMITY: ICD-10-CM

## 2021-04-26 DIAGNOSIS — S76.311A HAMSTRING STRAIN, RIGHT, INITIAL ENCOUNTER: Primary | ICD-10-CM

## 2021-04-26 PROCEDURE — 97161 PT EVAL LOW COMPLEX 20 MIN: CPT | Performed by: PHYSICAL THERAPIST

## 2021-04-26 PROCEDURE — 97110 THERAPEUTIC EXERCISES: CPT | Performed by: PHYSICAL THERAPIST

## 2021-04-26 NOTE — PROGRESS NOTES
PT Evaluation     Today's date: 2021  Patient name: Autumn Gary  : 1945  MRN: [id number]  Referring provider: Tiny Penn MD  Dx:   Encounter Diagnosis     ICD-10-CM    1  Pain of right lower extremity  M79 604 Ambulatory referral to Physical Therapy   2  Hamstring strain, right, initial encounter  S76 311A Ambulatory referral to Physical Therapy                  Assessment  Assessment details: Autumn Gary  is a pleasant 77 y o  male who presents with chronic R hamstring pain of a gradual onset for the last 6 months after walking 3 miles/day, 5-6 days/week  The pain is located near R ischial tuberosity and terminates at R popliteal fossa without any paresthesias  The patient's greatest concerns are fear of not being able to keep active  The primary movement problem is R hamstring tightness, resulting in pathoanatomical symptoms of Hamstring strain and limiting his ability to walk long distances  In addition, weakness of the R hamstring and hip girdle further prevents him from walking at his previous speed  No further referral is necessary at this time based upon examination results  Patient would benefit from skilled PT services to address the listed impairments to facilitate a return to OF   Thank you for the referral       Primary Impairments:  1) R hamstring tightness--> addressing with manual interventions, stretching, and active exercise  2) R hamstring weakness--> addressing with progressive strengthening and neuromotor re-education  3) Hip girdle weakness--> addressing with progressive strengthening and neuromotor re-education        Impairments: abnormal muscle firing, abnormal or restricted ROM, abnormal movement, activity intolerance, impaired physical strength, lacks appropriate home exercise program, pain with function, weight-bearing intolerance and poor body mechanics  Functional limitations: walking long distances at a fast pace  Symptom irritability: lowBarriers to therapy: none  Understanding of Dx/Px/POC: good   Prognosis: good  Prognosis details: Positive prognostic indicators include positive attitude toward recovery  Negative prognostic indicators include chronicity of symptoms  Goals  Patient will be independent with home exercise program    Patient will improve flexibility of R hamstring to be comparable to contralateral side to be able to walk longer distances  Patient will increase R hamstring strength to 4+/5 to 5/5 to be able to walk uphill  Patient will increase bilateral hip strength to 4+/5 to 5/5 in all planes to improve speed of community ambulation  Patient will be able to ascend/descend stairs without limitation  Patient will exceed MCID on FOTO to demonstrate improved function  Patient will be able to manage symptoms independently  Plan  Plan details: Prognosis above is given PT services 2x/week tapering to 1x/week over the next 2 months and home program adherence  Patient would benefit from: skilled physical therapy  Referral necessary: No  Planned modality interventions: thermotherapy: hydrocollator packs and cryotherapy  Planned therapy interventions: activity modification, joint mobilization, manual therapy, motor coordination training, neuromuscular re-education, patient education, therapeutic activities, therapeutic exercise, graded activity, home exercise program, massage, Dan taping, strengthening, stretching, flexibility and functional ROM exercises  Frequency: 2x week  Duration in weeks: 6  Treatment plan discussed with: patient        Subjective Evaluation    History of Present Illness  Mechanism of injury: This is a 78 yo male presenting with R hamstring pain of a gradual onset for the last 6 months  He reports that he was walking 3 miles per day for 5-6 days per week at that time  He reports that he noticed increasing pain in his R hamstring when walking uphill   He states that he took some time off of walking longer distances over the winter, and his hamstring started feeling slightly better after resting  He is currently able to walk shorter distances at a slower pace and desires to return to faster, longer-distance walking  He does have a history of LBP from 20 years ago            Recurrent probem    Quality of life: good    Pain  Current pain ratin  At best pain ratin  At worst pain ratin  Location: R buttock to R posterior knee  Quality: discomfort  Relieving factors: medications and rest (anti-inflammatories)  Aggravating factors: stair climbing and walking      Diagnostic Tests  No diagnostic tests performed  Treatments  No previous or current treatments  Patient Goals  Patient goals for therapy: decreased pain, increased strength and return to sport/leisure activities  Patient goal: to be able to walk longer distances, to be able to walk at a faster pace        Objective     Active Range of Motion     Lumbar   Flexion:  Restriction level: moderate  Extension:  Restriction level: maximal  Left Hip   External rotation (90/90): 50 degrees   Internal rotation (90/90): 40 degrees     Right Hip   External rotation (90/90): 50 degrees   Internal rotation (90/90): 40 degrees   Left Knee   Flexion: 130 degrees   Extension: 0 degrees     Right Knee   Flexion: 130 degrees   Extension: -2 degrees     Passive Range of Motion   Left Hip   External rotation (90/90): 55 degrees   External rotation (prone): 55 degrees   Internal rotation (90/90): 50 degrees   Internal rotation (prone): 40 degrees     Right Hip   External rotation (90/90): 55 degrees   External rotation (prone): 55 degrees   Internal rotation (90/90): 50 degrees   Internal rotation (prone): 40 degrees     Strength/Myotome Testing     Left Hip   Planes of Motion   Flexion: 4+  Extension: 3+  Abduction: 3  External rotation: 4-  Internal rotation: 4-    Isolated Muscles   Gluteus gulshan: 4-    Right Hip   Planes of Motion   Flexion: 4-  Extension: 3  Abduction: 3+  External rotation: 4-  Internal rotation: 4-    Isolated Muscles   Gluteus maximums: 3+    Left Knee   Flexion: 5  Prone flexion: 5  Extension: 5  Quadriceps contraction: good    Right Knee   Flexion: 4-  Prone flexion: 3+ (pain)  Extension: 3+  Quadriceps contraction: good    Tests     Lumbar     Left   Negative passive SLR  Right   Positive passive SLR (60 deg)  Left Hip   90/90 SLR: Negative  Right Hip   90/90 SLR: Positive (pain)  Functional Assessment      Squat    Left tibial anterior translation beyond toes, trunk lean right, sitting toward right side and right tibial anterior translation beyond toes       Single Leg Stance   Left: 15 seconds  Right: 10 seconds             Precautions: none      Manuals 4/26            R HS stretching                                                    Neuro Re-Ed             Wall ball squats             Sit to stand             SLS                                                                 Ther Ex             Rec bike HOANG             QS 5"x30 HEP            Long sit HS stretch NV            Supine active HS stretch 10"x10 HEP            HS sets NV            Supine clams NV            Supine hip ADD isometrics NV            Bridges             Prone hip EXT             Prone glute raises             Standing hip EXT             Standing knee FLX             Ther Activity                                       Gait Training                                       Modalities             MHP R hamstring PRN

## 2021-04-29 ENCOUNTER — OFFICE VISIT (OUTPATIENT)
Dept: PHYSICAL THERAPY | Facility: MEDICAL CENTER | Age: 76
End: 2021-04-29
Payer: MEDICARE

## 2021-04-29 DIAGNOSIS — S76.311A HAMSTRING STRAIN, RIGHT, INITIAL ENCOUNTER: ICD-10-CM

## 2021-04-29 DIAGNOSIS — M79.604 PAIN OF RIGHT LOWER EXTREMITY: Primary | ICD-10-CM

## 2021-04-29 PROCEDURE — 97110 THERAPEUTIC EXERCISES: CPT | Performed by: PHYSICAL THERAPIST

## 2021-04-29 NOTE — PROGRESS NOTES
Daily Note     Today's date: 2021  Patient name: Rosales Bernstein  : 1945  MRN: [id number]  Referring provider: Mel Donald MD  Dx:   Encounter Diagnosis     ICD-10-CM    1  Pain of right lower extremity  M79 604    2  Hamstring strain, right, initial encounter  S76 311A                   Subjective: Patient reports that he felt some slight relief in his R hamstring pain after last session  Objective: See treatment diary below      Assessment: Patient reported some pulling in his R hamstring after recumbent bike HOANG but reported no pain  Patient demonstrated improved quality of QS along with improved R knee EXT ROM today compared to last visit  Added supine hip ABD/ADD strengthening to decrease compensatory strain on hamstring during ambulation  MHP applied at end of session to further improve tissue extensibility  Patient was educated in updated illustrated HEP of long sit HS stretch and supine isometric HS sets  Patient would benefit from continued PT to address impairments to maximize function  Plan: Continue per plan of care        Precautions: none      Manuals            R HS stretching                                                    Neuro Re-Ed             Wall ball squats             Sit to stand             SLS                                                                 Ther Ex             Rec bike HOANG  5'           QS 5"x30 HEP 5"x30           Long sit HS stretch NV 30"x4           Supine active HS stretch 10"x10 HEP 10"x10           HS sets NV 5"x30           Supine clams NV 5"x30 RTB           Supine hip ADD isometrics NV 5"x30           Prone QS  5"x30           Bridges             Prone hip EXT             Prone glute raises             Standing hip EXT             Standing knee FLX             Ther Activity                                       Gait Training                                       Modalities             MHP R hamstring PRN  10' post prone

## 2021-05-03 ENCOUNTER — OFFICE VISIT (OUTPATIENT)
Dept: PHYSICAL THERAPY | Facility: MEDICAL CENTER | Age: 76
End: 2021-05-03
Payer: MEDICARE

## 2021-05-03 DIAGNOSIS — S76.311A HAMSTRING STRAIN, RIGHT, INITIAL ENCOUNTER: ICD-10-CM

## 2021-05-03 DIAGNOSIS — M79.604 PAIN OF RIGHT LOWER EXTREMITY: Primary | ICD-10-CM

## 2021-05-03 PROCEDURE — 97110 THERAPEUTIC EXERCISES: CPT | Performed by: PHYSICAL THERAPIST

## 2021-05-03 NOTE — PROGRESS NOTES
Daily Note     Today's date: 5/3/2021  Patient name: Hank Raza  : 1945  MRN: [id number]  Referring provider: Annalise Cazares MD  Dx:   Encounter Diagnosis     ICD-10-CM    1  Pain of right lower extremity  M79 604    2  Hamstring strain, right, initial encounter  S76 311A                   Subjective: Patient reports that he is pleased with his progress and has less pulling in his hamstring  He is able to walk approx  1 mile without much difficulty  He reports that he is not pushing himself to walk fast       Objective: See treatment diary below      Assessment: Patient continues to present with mild limitations in R knee EXT ROM and moderate restrictions in R hamstring flexibility  However, R quad set quality improved since last visit  Also able to progress resistance for supine clams, which demonstrates good progress toward goals  Added bridges and standing hip EXT to further improve hamstring and glute strength  All exercises performed in a pain-free range  Patient would benefit from continued PT to progress treatment as tolerated to return to longer distance ambulation  Plan: Continue per plan of care        Precautions: none      Manuals 4/26 4/29 5/3          R HS stretching                                                    Neuro Re-Ed             Wall ball squats             Sit to stand             SLS                                                                 Ther Ex             Rec bike HOANG  5' 10'          QS 5"x30 HEP 5"x30 5"x30          Strap gastroc stretch   30"x4          Long sit HS stretch NV 30"x4 30"x4          Supine active HS stretch 10"x10 HEP 10"x10 10"x10          HS sets NV 5"x30 5"x30          Supine clams NV 5"x30 RTB 5"x30 GTB          Supine hip ADD isometrics NV 5"x30 5"x30          Prone QS  5"x30 5"x30          Bridges   2x10          Prone hip EXT             Prone glute raises             Standing hip EXT    2x10 ea          Standing knee FLX   NV Ther Activity                                       Gait Training                                       Modalities             MHP R hamstring PRN  10' post prone 10' post prone

## 2021-05-06 ENCOUNTER — OFFICE VISIT (OUTPATIENT)
Dept: PHYSICAL THERAPY | Facility: MEDICAL CENTER | Age: 76
End: 2021-05-06
Payer: MEDICARE

## 2021-05-06 DIAGNOSIS — M79.604 PAIN OF RIGHT LOWER EXTREMITY: Primary | ICD-10-CM

## 2021-05-06 DIAGNOSIS — S76.311A HAMSTRING STRAIN, RIGHT, INITIAL ENCOUNTER: ICD-10-CM

## 2021-05-06 PROCEDURE — 97110 THERAPEUTIC EXERCISES: CPT | Performed by: PHYSICAL THERAPIST

## 2021-05-06 NOTE — PROGRESS NOTES
Daily Note     Today's date: 2021  Patient name: Autumn Gary  : 1945  MRN: [id number]  Referring provider: Tiny Penn MD  Dx:   Encounter Diagnosis     ICD-10-CM    1  Pain of right lower extremity  M79 604    2  Hamstring strain, right, initial encounter  S76 311A                   Subjective: Patient reports that he is continuing to feel better overall since beginning PT  However, he is about the same as last session  He reports that he did not have any soreness after last visit  Objective: See treatment diary below      Assessment: Patient demonstrated full R knee EXT ROM today along with improved quality of QS, which demonstrates good progress toward goals  Added supine clams to HEP to improve hip ABD strength and decrease compensatory strain on hamstring during ambulation  Able to progress reps for standing hip EXT, which demonstrates an improvement in CKC strength  Patient would benefit from continued PT to progress functional strengthening to return to longer distance ambulation  Plan: Continue per plan of care        Precautions: none      Manuals 4/26 4/29 5/3 5/6         R HS stretching                                                    Neuro Re-Ed             Wall ball squats             Sit to stand             SLS                                                                 Ther Ex             Rec bike HOANG  5' 10' 10'         QS 5"x30 HEP 5"x30 5"x30 5"x30         Strap gastroc stretch   30"x4 30"x4         Long sit HS stretch NV 30"x4 30"x4 30"x4         Supine active HS stretch 10"x10 HEP 10"x10 10"x10 10"x10         HS sets NV 5"x30 5"x30 5"x30         Supine clams NV 5"x30 RTB 5"x30 GTB 5"x30 blue         Supine hip ADD isometrics NV 5"x30 5"x30 5"x30         Prone QS  5"x30 5"x30 5"x30         Bridges   2x10 3x10         Prone hip EXT             Prone glute raises             Standing hip EXT    2x10 ea 3x10 ea         Standing knee FLX   NV 20 R         Ther Activity                                       Gait Training                                       Modalities             MHP R hamstring PRN  10' post prone 10' post prone 10' post prone

## 2021-05-11 ENCOUNTER — OFFICE VISIT (OUTPATIENT)
Dept: PHYSICAL THERAPY | Facility: MEDICAL CENTER | Age: 76
End: 2021-05-11
Payer: MEDICARE

## 2021-05-11 DIAGNOSIS — S76.311A HAMSTRING STRAIN, RIGHT, INITIAL ENCOUNTER: ICD-10-CM

## 2021-05-11 DIAGNOSIS — M79.604 PAIN OF RIGHT LOWER EXTREMITY: Primary | ICD-10-CM

## 2021-05-11 PROCEDURE — 97110 THERAPEUTIC EXERCISES: CPT | Performed by: PHYSICAL THERAPIST

## 2021-05-11 NOTE — PROGRESS NOTES
Daily Note     Today's date: 2021  Patient name: Yunior Vasques  : 1945  MRN: [id number]  Referring provider: Magda Flor MD  Dx:   Encounter Diagnosis     ICD-10-CM    1  Pain of right lower extremity  M79 604    2  Hamstring strain, right, initial encounter  S76 311A                   Subjective: Patient reports that his hamstring is starting to feel better, and he is now able to walk approximately 1 mile  Objective: See treatment diary below      Assessment: Continued with gentle HS stretching with patient demonstrating improvements in R HS flexibility  Progressed resistance for CKC glut and HS strengthening, which demonstrates good progress toward goals  Added wall ball squats to further improve CKC LE strength with cueing provided to prevent excessive anterior tibial translation  Patient is demonstrating good progress toward goals and would benefit from continued PT to improve functional strength and increase distance of community ambulation  Plan: Continue per plan of care        Precautions: none      Manuals 4/26 4/29 5/3 5/6 5/11        R HS stretching                                                    Neuro Re-Ed             Sit to stand             SLS                                                                 Ther Ex             Rec bike HOANG  5' 10' 10' 10'        QS 5"x30 HEP 5"x30 5"x30 5"x30 5"x30        Strap gastroc stretch   30"x4 30"x4 30"x4        Long sit HS stretch NV 30"x4 30"x4 30"x4 30"x4        Supine active HS stretch 10"x10 HEP 10"x10 10"x10 10"x10 10"x10        HS sets NV 5"x30 5"x30 5"x30 5"x30        Supine clams NV 5"x30 RTB 5"x30 GTB 5"x30 blue 5"x30 blue        Supine hip ADD isometrics NV 5"x30 5"x30 5"x30 5"x30        Prone QS  5"x30 5"x30 5"x30 5"x30        Bridges   2x10 3x10 3x10        Prone hip EXT             Prone glute raises             Wall ball squats     10        Standing hip EXT    2x10 ea 3x10 ea 15 ea 1#        Standing knee FLX NV 20 R 15 1#        Ther Activity                                       Gait Training                                       Modalities             MHP R hamstring PRN  10' post prone 10' post prone 10' post prone 10' post prone

## 2021-05-13 ENCOUNTER — OFFICE VISIT (OUTPATIENT)
Dept: PHYSICAL THERAPY | Facility: MEDICAL CENTER | Age: 76
End: 2021-05-13
Payer: MEDICARE

## 2021-05-13 DIAGNOSIS — M79.604 PAIN OF RIGHT LOWER EXTREMITY: Primary | ICD-10-CM

## 2021-05-13 DIAGNOSIS — S76.311A HAMSTRING STRAIN, RIGHT, INITIAL ENCOUNTER: ICD-10-CM

## 2021-05-13 PROCEDURE — 97110 THERAPEUTIC EXERCISES: CPT | Performed by: PHYSICAL THERAPIST

## 2021-05-13 NOTE — PROGRESS NOTES
Daily Note     Today's date: 2021  Patient name: Eduardo Silva  : 1945  MRN: [id number]  Referring provider: Fredis Vann MD  Dx:   Encounter Diagnosis     ICD-10-CM    1  Pain of right lower extremity  M79 604    2  Hamstring strain, right, initial encounter  S76 311A                   Subjective: Patient reports that his hamstring is continuing to feel better, and he is now able to walk 1 5 miles without any pain  Objective: See treatment diary below      Assessment: Held strap gastroc stretch due to R knee EXT ROM being [facility]  Added resistance to prone QS to further improve posterior chain strength  Also progressed resistance for CKC strengthening with cueing provided to maintain knee EXT during standing hip EXT  Also added leg press to further improve quad/HS strength  Cueing provided to prevent anterior tibial translation during wall ball squats  All exercises performed in a pain-free range  Patient would benefit from continued PT to progress treatment as tolerated to be able to walk longer distances  Plan: Continue per plan of care        Precautions: none      Manuals 4/26 4/29 5/3 5/6 5/11 5/13       R HS stretching                                                    Neuro Re-Ed             Sit to stand             SLS                                                                 Ther Ex             Rec bike HOANG  5' 10' 10' 10' 10'       QS 5"x30 HEP 5"x30 5"x30 5"x30 5"x30 5"x30       Strap gastroc stretch   30"x4 30"x4 30"x4 np       Long sit HS stretch NV 30"x4 30"x4 30"x4 30"x4 30"x4       Supine active HS stretch 10"x10 HEP 10"x10 10"x10 10"x10 10"x10 10"x10       HS sets NV 5"x30 5"x30 5"x30 5"x30 5"x30       Supine clams NV 5"x30 RTB 5"x30 GTB 5"x30 blue 5"x30 blue 5"x30 blue       Supine hip ADD isometrics NV 5"x30 5"x30 5"x30 5"x30 5"x30       Prone QS  5"x30 5"x30 5"x30 5"x30 5"x30 1#       Bridges   2x10 3x10 3x10 3x10       Prone hip EXT             Prone glute raises Wall ball squats     10 15       Standing hip EXT    2x10 ea 3x10 ea 15 ea 1# 2x10 ea 2#       Standing knee FLX   NV 20 R 15 1# 2x10 ea 2#       Leg press      2x10 DL 40#       Ther Activity                                       Gait Training                                       Modalities             MHP R hamstring PRN  10' post prone 10' post prone 10' post prone 10' post prone 10' post prone

## 2021-05-18 ENCOUNTER — OFFICE VISIT (OUTPATIENT)
Dept: PHYSICAL THERAPY | Facility: MEDICAL CENTER | Age: 76
End: 2021-05-18
Payer: MEDICARE

## 2021-05-18 DIAGNOSIS — M79.604 PAIN OF RIGHT LOWER EXTREMITY: Primary | ICD-10-CM

## 2021-05-18 DIAGNOSIS — S76.311A HAMSTRING STRAIN, RIGHT, INITIAL ENCOUNTER: ICD-10-CM

## 2021-05-18 PROCEDURE — 97110 THERAPEUTIC EXERCISES: CPT | Performed by: PHYSICAL THERAPIST

## 2021-05-18 NOTE — PROGRESS NOTES
Daily Note     Today's date: 2021  Patient name: Sepideh Spears  : 1945  MRN: [id number]  Referring provider: Sarmad Augustin MD  Dx:   Encounter Diagnosis     ICD-10-CM    1  Pain of right lower extremity  M79 604    2  Hamstring strain, right, initial encounter  S76 311A                   Subjective: Patient reports that his hamstring is feeling pretty good, and he is now able to walk 2 miles  Objective: See treatment diary below      Assessment: Patient continues to demonstrate improvements in R HS flexibility  He also was able to progress resistance for both OKC and CKC hip girdle and hamstring strengthening interventions, which demonstrates good progress toward goals  Added standing hip ABD to further improve multiplanar hip strength and decrease compensatory strain on hamstring when ambulating  Cueing provided for proper foot positioning during wall ball squats  All exercises performed in a pain-free range  Patient demonstrated fatigue at end of session  Patient would benefit from continued PT to progress strengthening as tolerated to be able to walk longer distances at his prior speed  Plan: Continue per plan of care        Precautions: none      Manuals 4/26 4/29 5/3 5/6 5/11 5/13 5/18      R HS stretching                                                    Neuro Re-Ed             Sit to stand             SLS                                                                 Ther Ex             Rec bike HOANG  5' 10' 10' 10' 10' 10'      QS 5"x30 HEP 5"x30 5"x30 5"x30 5"x30 5"x30 5"x30      Strap gastroc stretch   30"x4 30"x4 30"x4 np np      Long sit HS stretch NV 30"x4 30"x4 30"x4 30"x4 30"x4 30"x4      Supine active HS stretch 10"x10 HEP 10"x10 10"x10 10"x10 10"x10 10"x10 10"x10      HS sets NV 5"x30 5"x30 5"x30 5"x30 5"x30 5"x30      Supine clams NV 5"x30 RTB 5"x30 GTB 5"x30 blue 5"x30 blue 5"x30 blue 5"x30 black      Supine hip ADD isometrics NV 5"x30 5"x30 5"x30 5"x30 5"x30 5"x35 Prone QS  5"x30 5"x30 5"x30 5"x30 5"x30 1# 5"x20 2#      Bridges   2x10 3x10 3x10 3x10 3x10      Prone hip EXT             Prone glute raises             Wall ball squats     10 15 20      Standing hip EXT    2x10 ea 3x10 ea 15 ea 1# 2x10 ea 2# 3x10 ea 2#      Standing knee FLX   NV 20 R 15 1# 2x10 ea 2# 3x10 2#      Standing hip ABD       10 ea 2#      Leg press      2x10 DL 40# 2x10 DL 45#      Ther Activity                                       Gait Training                                       Modalities             MHP R hamstring PRN  10' post prone 10' post prone 10' post prone 10' post prone 10' post prone 10' post prone

## 2021-05-20 ENCOUNTER — APPOINTMENT (OUTPATIENT)
Dept: PHYSICAL THERAPY | Facility: MEDICAL CENTER | Age: 76
End: 2021-05-20
Payer: MEDICARE

## 2021-05-21 ENCOUNTER — OFFICE VISIT (OUTPATIENT)
Dept: PHYSICAL THERAPY | Facility: MEDICAL CENTER | Age: 76
End: 2021-05-21
Payer: MEDICARE

## 2021-05-21 DIAGNOSIS — S76.311A HAMSTRING STRAIN, RIGHT, INITIAL ENCOUNTER: ICD-10-CM

## 2021-05-21 DIAGNOSIS — M79.604 PAIN OF RIGHT LOWER EXTREMITY: Primary | ICD-10-CM

## 2021-05-21 PROCEDURE — 97110 THERAPEUTIC EXERCISES: CPT | Performed by: PHYSICAL THERAPIST

## 2021-05-21 NOTE — PROGRESS NOTES
Daily Note     Today's date: 2021  Patient name: Maria Guadalupe Mancini  : 1945  MRN: [id number]  Referring provider: Jonelle Lamas MD  Dx:   Encounter Diagnosis     ICD-10-CM    1  Pain of right lower extremity  M79 604    2  Hamstring strain, right, initial encounter  S76 311A                   Subjective: Patient reports that he is continuing to feel better, and he is now able to walk just over 2 miles at a slightly faster speed  He reports that he did not have any soreness after last session  Objective: See treatment diary below      Assessment: Patient continues to demonstrate good progress with improving R HS flexibility  Also able to progress resistance for all CKC HS and hip girdle PREs, which further demonstrates good progress toward goals  Added SL leg press to improve R LE strength for long distance ambulation  All exercises performed in a pain-free range  Patient would benefit from continued PT to progress strengthening to be able to walk in the community at prior capacity  Plan: Continue per plan of care        Precautions: none      Manuals 4/26 4/29 5/3 5/6 5/11 5/13 5/18 5/21     R HS stretching                                                    Neuro Re-Ed             Sit to stand             SLS                                                                 Ther Ex             Rec bike HOANG  5' 10' 10' 10' 10' 10' 10'     QS 5"x30 HEP 5"x30 5"x30 5"x30 5"x30 5"x30 5"x30 5"x30     Strap gastroc stretch   30"x4 30"x4 30"x4 np np      Long sit HS stretch NV 30"x4 30"x4 30"x4 30"x4 30"x4 30"x4 30"x4     Supine active HS stretch 10"x10 HEP 10"x10 10"x10 10"x10 10"x10 10"x10 10"x10 10"x10     HS sets NV 5"x30 5"x30 5"x30 5"x30 5"x30 5"x30 5"x30     Supine clams NV 5"x30 RTB 5"x30 GTB 5"x30 blue 5"x30 blue 5"x30 blue 5"x30 black 5"x35 black     Supine hip ADD isometrics NV 5"x30 5"x30 5"x30 5"x30 5"x30 5"x35 5"x35     Prone QS  5"x30 5"x30 5"x30 5"x30 5"x30 1# 5"x20 2# 5"x30 2# Bridges   2x10 3x10 3x10 3x10 3x10 3x10     Prone hip EXT             Prone glute raises             Wall ball squats     10 15 20 30     Standing hip EXT    2x10 ea 3x10 ea 15 ea 1# 2x10 ea 2# 3x10 ea 2# 2x10 ea 3#     Standing knee FLX   NV 20 R 15 1# 2x10 ea 2# 3x10 2# 30 3#     Standing hip ABD       10 ea 2# 10 ea 3#     Leg press      2x10 DL 40# 2x10 DL 45# 2x10 DL 50#, 2x10 SL 20#     Ther Activity                                       Gait Training                                       Modalities             MHP R hamstring PRN  10' post prone 10' post prone 10' post prone 10' post prone 10' post prone 10' post prone 10' post prone

## 2021-05-25 ENCOUNTER — OFFICE VISIT (OUTPATIENT)
Dept: PHYSICAL THERAPY | Facility: MEDICAL CENTER | Age: 76
End: 2021-05-25
Payer: MEDICARE

## 2021-05-25 DIAGNOSIS — M79.604 PAIN OF RIGHT LOWER EXTREMITY: Primary | ICD-10-CM

## 2021-05-25 DIAGNOSIS — S76.311A HAMSTRING STRAIN, RIGHT, INITIAL ENCOUNTER: ICD-10-CM

## 2021-05-25 PROCEDURE — 97110 THERAPEUTIC EXERCISES: CPT | Performed by: PHYSICAL THERAPIST

## 2021-05-25 NOTE — PROGRESS NOTES
Progress Note     Today's date: 2021  Patient name: Lalito Lamb  : 1945  MRN: [id number]  Referring provider: Radha Ramos MD  Dx:   Encounter Diagnosis     ICD-10-CM    1  Pain of right lower extremity  M79 604    2  Hamstring strain, right, initial encounter  S76 311A                   Subjective: Patient reports that his hamstring is continuing to feel better, and he is now able to walk 3 miles on a flat surface  He states that he is not walking at his usual speed but feels confident that his speed will come back soon  He also has not attempted walking uphill yet  He reports no other remaining limitations at home and no difficulty ascending or descending stairs  He is pleased with progress and is agreeable to decreasing frequency to 1x/week  Objective: See treatment diary below    Knee FLX strength:  L: , R:       Assessment: Patient is demonstrating excellent progress with improving HS flexibility, knee strength, and hip girdle strength since initial visit  This has allowed him to return to longer distance ambulation and improve his stair negotiation at home  Although improved since IE, patient continues to present with mild strength deficits in R HS compared to contralateral side  This limits his ability to walk uphill and prevents him from walking at his prior speed  He is demonstrating good progress toward his goals and is gaining independence with his HEP  Due to good progress, plan to decrease frequency to 1x/week for 1-2 more weeks to progress CKC strengthening to walk uphill at an increased hernando  Goals  Patient will be independent with home exercise program - met   Patient will improve flexibility of R hamstring to be comparable to contralateral side to be able to walk longer distances  - met  Patient will increase R hamstring strength to 4+/5 to 5/5 to be able to walk uphill - in progress (improved)  Patient will increase bilateral hip strength to 4+/5 to 5/5 in all planes to improve speed of community ambulation  - in progress (improved)  Patient will be able to ascend/descend stairs without limitation - met  Patient will exceed MCID on FOTO to demonstrate improved function  - in progress (improved)  Patient will be able to manage symptoms independently  - in progress     Plan: Decrease frequency to 1x/week for 1-2 more weeks       Precautions: none      Manuals 4/26 4/29 5/3 5/6 5/11 5/13 5/18 5/21 5/25    R HS stretching                                                    Neuro Re-Ed             Sit to stand             SLS                                                                 Ther Ex             Rec bike HOANG  5' 10' 10' 10' 10' 10' 10' 10'    QS 5"x30 HEP 5"x30 5"x30 5"x30 5"x30 5"x30 5"x30 5"x30 5"x30    Strap gastroc stretch   30"x4 30"x4 30"x4 np np      Long sit HS stretch NV 30"x4 30"x4 30"x4 30"x4 30"x4 30"x4 30"x4 30"x4    Supine active HS stretch 10"x10 HEP 10"x10 10"x10 10"x10 10"x10 10"x10 10"x10 10"x10 10"x10    HS sets NV 5"x30 5"x30 5"x30 5"x30 5"x30 5"x30 5"x30 5"x30    Supine clams NV 5"x30 RTB 5"x30 GTB 5"x30 blue 5"x30 blue 5"x30 blue 5"x30 black 5"x35 black 5"x35 black    Supine hip ADD isometrics NV 5"x30 5"x30 5"x30 5"x30 5"x30 5"x35 5"x35 5"x35    Prone QS  5"x30 5"x30 5"x30 5"x30 5"x30 1# 5"x20 2# 5"x30 2# 5"x30 2#    Bridges   2x10 3x10 3x10 3x10 3x10 3x10 2x10 5#    Prone ecc HS curls         20    Prone hip EXT             Prone glute raises             Wall ball squats     10 15 20 30 30    Standing hip EXT    2x10 ea 3x10 ea 15 ea 1# 2x10 ea 2# 3x10 ea 2# 2x10 ea 3# 3x10 ea 3#    Standing knee FLX   NV 20 R 15 1# 2x10 ea 2# 3x10 2# 30 3# 30 3#    Standing hip ABD       10 ea 2# 10 ea 3# 3x10 ea 3#    Leg press      2x10 DL 40# 2x10 DL 45# 2x10 DL 50#, 2x10 SL 20# 2x10 DL 55#, 2x10 SL 20#    Ther Activity                                       Gait Training                                       Modalities             MHP R hamstring PRN  10' post prone 10' post prone 10' post prone 10' post prone 10' post prone 10' post prone 10' post prone 10' post prone

## 2021-05-27 ENCOUNTER — APPOINTMENT (OUTPATIENT)
Dept: PHYSICAL THERAPY | Facility: MEDICAL CENTER | Age: 76
End: 2021-05-27
Payer: MEDICARE

## 2021-06-01 ENCOUNTER — OFFICE VISIT (OUTPATIENT)
Dept: PHYSICAL THERAPY | Facility: MEDICAL CENTER | Age: 76
End: 2021-06-01
Payer: MEDICARE

## 2021-06-01 DIAGNOSIS — S76.311A HAMSTRING STRAIN, RIGHT, INITIAL ENCOUNTER: ICD-10-CM

## 2021-06-01 DIAGNOSIS — M79.604 PAIN OF RIGHT LOWER EXTREMITY: Primary | ICD-10-CM

## 2021-06-01 PROCEDURE — 97110 THERAPEUTIC EXERCISES: CPT | Performed by: PHYSICAL THERAPIST

## 2021-06-01 NOTE — PROGRESS NOTES
Discharge Summary     Today's date: 2021  Patient name: Abi Calixto  : 1945  MRN: [id number]  Referring provider: Josafat Barlow MD  Dx:   Encounter Diagnosis     ICD-10-CM    1  Pain of right lower extremity  M79 604    2  Hamstring strain, right, initial encounter  S76 311A                   Subjective: Patient reports that his R hamstring is feeling much better, and he has no remaining limitations at home from his R leg  He reports that he is able to walk 3 miles at a faster pace than previously  He feels comfortable with his home exercise program and is agreeable to be discharged to his HEP  Objective: See treatment diary below    Knee strength:   FLX: L: , R:   EXT: L: , R:     Assessment: Patient has demonstrated steady progress with improving R hamstring flexibility since initial visit, which has allowed him to return to long-distance community ambulation  In addition, patient has also improved R hamstring, quad, and hip girdle strength to be comparable to contralateral side in all planes  This has allowed him to return to ambulating with improved gait mechanics and prior speed without limitation  Patient was educated in comprehensive illustrated HEP for continued flexibility and hip girdle/hamstring strengthening  Patient has met all of his goals for PT, and he is now discharged to his HEP  Goals  Patient will be independent with home exercise program - met   Patient will improve flexibility of R hamstring to be comparable to contralateral side to be able to walk longer distances  - met  Patient will increase R hamstring strength to 4+/5 to 5/5 to be able to walk uphill - met  Patient will increase bilateral hip strength to 4+/5 to 5/5 in all planes to improve speed of community ambulation - met  Patient will be able to ascend/descend stairs without limitation - met  Patient will exceed MCID on FOTO to demonstrate improved function  - met  Patient will be able to manage symptoms independently  - met     Plan: Discharge to HEP       Precautions: none      Manuals 6/1 4/26 4/29 5/3 5/6 5/11 5/13 5/18 5/21 5/25    R HS stretching                                                        Neuro Re-Ed              Sit to stand              SLS                                                                      Ther Ex              Rec bike HOANG 10'  5' 10' 10' 10' 10' 10' 10' 10'    QS 5"x30 5"x30 HEP 5"x30 5"x30 5"x30 5"x30 5"x30 5"x30 5"x30 5"x30    Strap gastroc stretch    30"x4 30"x4 30"x4 np np      Long sit HS stretch HEP NV 30"x4 30"x4 30"x4 30"x4 30"x4 30"x4 30"x4 30"x4    Supine active HS stretch 10"x10 10"x10 HEP 10"x10 10"x10 10"x10 10"x10 10"x10 10"x10 10"x10 10"x10    HS sets np NV 5"x30 5"x30 5"x30 5"x30 5"x30 5"x30 5"x30 5"x30    Supine clams 5"x35 black NV 5"x30 RTB 5"x30 GTB 5"x30 blue 5"x30 blue 5"x30 blue 5"x30 black 5"x35 black 5"x35 black    Supine hip ADD isometrics 5"x35 NV 5"x30 5"x30 5"x30 5"x30 5"x30 5"x35 5"x35 5"x35    Prone QS 5"x30 2#  5"x30 5"x30 5"x30 5"x30 5"x30 1# 5"x20 2# 5"x30 2# 5"x30 2#    Bridges 5"x30 5#   2x10 3x10 3x10 3x10 3x10 3x10 2x10 5#    Prone ecc HS curls 30         20    Prone hip EXT              Prone glute raises              Wall ball squats 30     10 15 20 30 30    Standing hip EXT 3x10 ea 3#    2x10 ea 3x10 ea 15 ea 1# 2x10 ea 2# 3x10 ea 2# 2x10 ea 3# 3x10 ea 3#    Standing knee FLX 3x10 3#   NV 20 R 15 1# 2x10 ea 2# 3x10 2# 30 3# 30 3#    Standing hip ABD 3x10 ea 3#       10 ea 2# 10 ea 3# 3x10 ea 3#    Leg press 3x10 DL 55#, 3x10 SL 20#      2x10 DL 40# 2x10 DL 45# 2x10 DL 50#, 2x10 SL 20# 2x10 DL 55#, 2x10 SL 20#    Ther Activity                                          Gait Training                                          Modalities              MHP R hamstring PRN 10' post prone  10' post prone 10' post prone 10' post prone 10' post prone 10' post prone 10' post prone 10' post prone 10' post prone

## 2021-06-03 ENCOUNTER — APPOINTMENT (OUTPATIENT)
Dept: PHYSICAL THERAPY | Facility: MEDICAL CENTER | Age: 76
End: 2021-06-03
Payer: MEDICARE

## 2021-06-29 ENCOUNTER — LAB REQUISITION (OUTPATIENT)
Dept: LAB | Facility: HOSPITAL | Age: 76
End: 2021-06-29
Payer: MEDICARE

## 2021-06-29 DIAGNOSIS — Z86.010 PERSONAL HISTORY OF COLONIC POLYPS: ICD-10-CM

## 2021-06-29 DIAGNOSIS — Z12.11 ENCOUNTER FOR SCREENING FOR MALIGNANT NEOPLASM OF COLON: ICD-10-CM

## 2021-06-29 PROCEDURE — 88305 TISSUE EXAM BY PATHOLOGIST: CPT | Performed by: PATHOLOGY

## 2021-11-18 ENCOUNTER — OFFICE VISIT (OUTPATIENT)
Dept: FAMILY MEDICINE CLINIC | Facility: CLINIC | Age: 76
End: 2021-11-18
Payer: MEDICARE

## 2021-11-18 VITALS
SYSTOLIC BLOOD PRESSURE: 140 MMHG | OXYGEN SATURATION: 96 % | HEART RATE: 76 BPM | HEIGHT: 77 IN | DIASTOLIC BLOOD PRESSURE: 76 MMHG | TEMPERATURE: 97.2 F | WEIGHT: 197.6 LBS | BODY MASS INDEX: 23.33 KG/M2 | RESPIRATION RATE: 17 BRPM

## 2021-11-18 DIAGNOSIS — M85.88 OTHER SPECIFIED DISORDERS OF BONE DENSITY AND STRUCTURE, OTHER SITE: ICD-10-CM

## 2021-11-18 DIAGNOSIS — J34.89 NASAL DRYNESS: ICD-10-CM

## 2021-11-18 DIAGNOSIS — Z00.00 ENCOUNTER FOR ANNUAL WELLNESS EXAM IN MEDICARE PATIENT: Primary | ICD-10-CM

## 2021-11-18 DIAGNOSIS — J34.89 RHINORRHEA: ICD-10-CM

## 2021-11-18 PROCEDURE — G0439 PPPS, SUBSEQ VISIT: HCPCS | Performed by: NURSE PRACTITIONER

## 2021-11-18 PROCEDURE — 99214 OFFICE O/P EST MOD 30 MIN: CPT | Performed by: NURSE PRACTITIONER

## 2021-11-18 RX ORDER — SODIUM CHLORIDE/ALOE VERA
1 GEL (GRAM) NASAL
Qty: 14.1 G | Refills: 3 | Status: SHIPPED | OUTPATIENT
Start: 2021-11-18 | End: 2022-02-18

## 2021-11-18 RX ORDER — FLUTICASONE PROPIONATE 50 MCG
2 SPRAY, SUSPENSION (ML) NASAL DAILY
Qty: 16 G | Refills: 1 | Status: SHIPPED | OUTPATIENT
Start: 2021-11-18 | End: 2021-12-12

## 2021-12-03 ENCOUNTER — APPOINTMENT (OUTPATIENT)
Dept: LAB | Facility: MEDICAL CENTER | Age: 76
End: 2021-12-03
Payer: MEDICARE

## 2021-12-03 DIAGNOSIS — Z00.00 ENCOUNTER FOR ANNUAL WELLNESS EXAM IN MEDICARE PATIENT: ICD-10-CM

## 2021-12-03 LAB
ALBUMIN SERPL BCP-MCNC: 3 G/DL (ref 3.5–5)
ALP SERPL-CCNC: 67 U/L (ref 46–116)
ALT SERPL W P-5'-P-CCNC: 20 U/L (ref 12–78)
ANION GAP SERPL CALCULATED.3IONS-SCNC: 4 MMOL/L (ref 4–13)
AST SERPL W P-5'-P-CCNC: 21 U/L (ref 5–45)
BILIRUB SERPL-MCNC: 0.53 MG/DL (ref 0.2–1)
BUN SERPL-MCNC: 22 MG/DL (ref 5–25)
CALCIUM ALBUM COR SERPL-MCNC: 9.7 MG/DL (ref 8.3–10.1)
CALCIUM SERPL-MCNC: 8.9 MG/DL (ref 8.3–10.1)
CHLORIDE SERPL-SCNC: 105 MMOL/L (ref 100–108)
CHOLEST SERPL-MCNC: 136 MG/DL
CO2 SERPL-SCNC: 29 MMOL/L (ref 21–32)
CREAT SERPL-MCNC: 0.8 MG/DL (ref 0.6–1.3)
GFR SERPL CREATININE-BSD FRML MDRD: 87 ML/MIN/1.73SQ M
GLUCOSE P FAST SERPL-MCNC: 84 MG/DL (ref 65–99)
HDLC SERPL-MCNC: 68 MG/DL
LDLC SERPL CALC-MCNC: 59 MG/DL (ref 0–100)
NONHDLC SERPL-MCNC: 68 MG/DL
POTASSIUM SERPL-SCNC: 4.1 MMOL/L (ref 3.5–5.3)
PROT SERPL-MCNC: 7.2 G/DL (ref 6.4–8.2)
SODIUM SERPL-SCNC: 138 MMOL/L (ref 136–145)
TRIGL SERPL-MCNC: 43 MG/DL

## 2021-12-03 PROCEDURE — 36415 COLL VENOUS BLD VENIPUNCTURE: CPT

## 2021-12-03 PROCEDURE — 80061 LIPID PANEL: CPT

## 2021-12-03 PROCEDURE — 80053 COMPREHEN METABOLIC PANEL: CPT

## 2021-12-06 ENCOUNTER — TELEPHONE (OUTPATIENT)
Dept: FAMILY MEDICINE CLINIC | Facility: CLINIC | Age: 76
End: 2021-12-06

## 2021-12-10 DIAGNOSIS — J34.89 RHINORRHEA: ICD-10-CM

## 2021-12-12 RX ORDER — FLUTICASONE PROPIONATE 50 MCG
SPRAY, SUSPENSION (ML) NASAL
Qty: 16 ML | Refills: 1 | Status: SHIPPED | OUTPATIENT
Start: 2021-12-12 | End: 2022-01-08

## 2021-12-17 ENCOUNTER — TREATMENT (OUTPATIENT)
Dept: FAMILY MEDICINE CLINIC | Facility: CLINIC | Age: 76
End: 2021-12-17

## 2021-12-17 ENCOUNTER — APPOINTMENT (OUTPATIENT)
Dept: RADIOLOGY | Facility: MEDICAL CENTER | Age: 76
End: 2021-12-17
Payer: MEDICARE

## 2021-12-17 DIAGNOSIS — R05.9 COUGH: Primary | ICD-10-CM

## 2021-12-17 DIAGNOSIS — R05.9 COUGH: ICD-10-CM

## 2021-12-17 PROCEDURE — 71046 X-RAY EXAM CHEST 2 VIEWS: CPT

## 2021-12-20 ENCOUNTER — TREATMENT (OUTPATIENT)
Dept: FAMILY MEDICINE CLINIC | Facility: CLINIC | Age: 76
End: 2021-12-20

## 2021-12-20 DIAGNOSIS — R93.89 ABNORMAL CT OF THE CHEST: Primary | ICD-10-CM

## 2021-12-27 ENCOUNTER — TELEMEDICINE (OUTPATIENT)
Dept: FAMILY MEDICINE CLINIC | Facility: CLINIC | Age: 76
End: 2021-12-27
Payer: MEDICARE

## 2021-12-27 DIAGNOSIS — J34.89 DRY NOSE: Primary | ICD-10-CM

## 2021-12-27 PROCEDURE — 99213 OFFICE O/P EST LOW 20 MIN: CPT | Performed by: FAMILY MEDICINE

## 2022-01-05 ENCOUNTER — APPOINTMENT (OUTPATIENT)
Dept: LAB | Facility: MEDICAL CENTER | Age: 77
End: 2022-01-05
Payer: MEDICARE

## 2022-01-05 DIAGNOSIS — E88.09 HYPOALBUMINEMIA: ICD-10-CM

## 2022-01-05 LAB
ALBUMIN SERPL BCP-MCNC: 3.4 G/DL (ref 3.5–5)
ALP SERPL-CCNC: 60 U/L (ref 46–116)
ALT SERPL W P-5'-P-CCNC: 20 U/L (ref 12–78)
ANION GAP SERPL CALCULATED.3IONS-SCNC: 5 MMOL/L (ref 4–13)
AST SERPL W P-5'-P-CCNC: 22 U/L (ref 5–45)
BILIRUB SERPL-MCNC: 0.93 MG/DL (ref 0.2–1)
BUN SERPL-MCNC: 23 MG/DL (ref 5–25)
CALCIUM ALBUM COR SERPL-MCNC: 10.5 MG/DL (ref 8.3–10.1)
CALCIUM SERPL-MCNC: 10 MG/DL (ref 8.3–10.1)
CHLORIDE SERPL-SCNC: 105 MMOL/L (ref 100–108)
CO2 SERPL-SCNC: 28 MMOL/L (ref 21–32)
CREAT SERPL-MCNC: 0.86 MG/DL (ref 0.6–1.3)
GFR SERPL CREATININE-BSD FRML MDRD: 84 ML/MIN/1.73SQ M
GLUCOSE P FAST SERPL-MCNC: 85 MG/DL (ref 65–99)
POTASSIUM SERPL-SCNC: 4.1 MMOL/L (ref 3.5–5.3)
PROT SERPL-MCNC: 7.3 G/DL (ref 6.4–8.2)
SODIUM SERPL-SCNC: 138 MMOL/L (ref 136–145)

## 2022-01-05 PROCEDURE — 36415 COLL VENOUS BLD VENIPUNCTURE: CPT

## 2022-01-05 PROCEDURE — 80053 COMPREHEN METABOLIC PANEL: CPT

## 2022-01-24 ENCOUNTER — HOSPITAL ENCOUNTER (OUTPATIENT)
Dept: CT IMAGING | Facility: HOSPITAL | Age: 77
Discharge: HOME | End: 2022-01-24
Payer: MEDICARE

## 2022-01-24 DIAGNOSIS — R93.89 ABNORMAL CT OF THE CHEST: ICD-10-CM

## 2022-01-24 PROCEDURE — G1004 CDSM NDSC: HCPCS

## 2022-01-24 PROCEDURE — 71260 CT THORAX DX C+: CPT

## 2022-01-24 RX ADMIN — IOHEXOL 85 ML: 350 INJECTION, SOLUTION INTRAVENOUS at 15:13

## 2022-01-26 ENCOUNTER — TREATMENT (OUTPATIENT)
Dept: FAMILY MEDICINE CLINIC | Facility: CLINIC | Age: 77
End: 2022-01-26

## 2022-01-26 ENCOUNTER — TELEPHONE (OUTPATIENT)
Dept: FAMILY MEDICINE CLINIC | Facility: CLINIC | Age: 77
End: 2022-01-26

## 2022-01-26 DIAGNOSIS — R93.89 ABNORMAL CT OF THE CHEST: Primary | ICD-10-CM

## 2022-01-26 NOTE — TELEPHONE ENCOUNTER
Radiology at [facility] called to report significant findings on CT patient had done

## 2022-02-02 ENCOUNTER — TELEPHONE (OUTPATIENT)
Dept: UROLOGY | Facility: MEDICAL CENTER | Age: 77
End: 2022-02-02

## 2022-02-02 DIAGNOSIS — N13.8 BPH WITH OBSTRUCTION/LOWER URINARY TRACT SYMPTOMS: ICD-10-CM

## 2022-02-02 DIAGNOSIS — Z12.5 PROSTATE CANCER SCREENING: Primary | ICD-10-CM

## 2022-02-02 DIAGNOSIS — N40.1 BPH WITH OBSTRUCTION/LOWER URINARY TRACT SYMPTOMS: ICD-10-CM

## 2022-02-02 NOTE — TELEPHONE ENCOUNTER
Patient of DR Hugh Khanna at [facility]    Patient called stating he needs an order for his psa to be put into system  He has appointment for 03/03/22

## 2022-02-04 ENCOUNTER — DOCUMENTATION (OUTPATIENT)
Dept: FAMILY MEDICINE CLINIC | Facility: CLINIC | Age: 77
End: 2022-02-04

## 2022-02-07 ENCOUNTER — HOSPITAL ENCOUNTER (OUTPATIENT)
Dept: BONE DENSITY | Facility: MEDICAL CENTER | Age: 77
Discharge: HOME | End: 2022-02-07
Payer: MEDICARE

## 2022-02-07 ENCOUNTER — APPOINTMENT (OUTPATIENT)
Dept: LAB | Facility: MEDICAL CENTER | Age: 77
End: 2022-02-07
Attending: UROLOGY
Payer: MEDICARE

## 2022-02-07 DIAGNOSIS — M85.88 OTHER SPECIFIED DISORDERS OF BONE DENSITY AND STRUCTURE, OTHER SITE: ICD-10-CM

## 2022-02-07 DIAGNOSIS — Z12.5 PROSTATE CANCER SCREENING: ICD-10-CM

## 2022-02-07 DIAGNOSIS — Z00.00 ENCOUNTER FOR ANNUAL WELLNESS EXAM IN MEDICARE PATIENT: ICD-10-CM

## 2022-02-07 LAB — PSA SERPL-MCNC: 1.8 NG/ML (ref 0–4)

## 2022-02-07 PROCEDURE — 77080 DXA BONE DENSITY AXIAL: CPT

## 2022-02-07 PROCEDURE — G0103 PSA SCREENING: HCPCS

## 2022-02-07 PROCEDURE — 36415 COLL VENOUS BLD VENIPUNCTURE: CPT

## 2022-02-18 ENCOUNTER — OFFICE VISIT (OUTPATIENT)
Dept: FAMILY MEDICINE CLINIC | Facility: CLINIC | Age: 77
End: 2022-02-18
Payer: MEDICARE

## 2022-02-18 VITALS
SYSTOLIC BLOOD PRESSURE: 142 MMHG | HEIGHT: 77 IN | TEMPERATURE: 97.5 F | HEART RATE: 82 BPM | WEIGHT: 198 LBS | DIASTOLIC BLOOD PRESSURE: 76 MMHG | OXYGEN SATURATION: 97 % | BODY MASS INDEX: 23.38 KG/M2

## 2022-02-18 DIAGNOSIS — L30.9 DERMATITIS: ICD-10-CM

## 2022-02-18 DIAGNOSIS — L03.012 ABSCESS AROUND NAIL OF LEFT INDEX FINGER: Primary | ICD-10-CM

## 2022-02-18 PROCEDURE — 10060 I&D ABSCESS SIMPLE/SINGLE: CPT | Performed by: FAMILY MEDICINE

## 2022-02-18 PROCEDURE — 99214 OFFICE O/P EST MOD 30 MIN: CPT | Performed by: FAMILY MEDICINE

## 2022-02-18 NOTE — ASSESSMENT & PLAN NOTE
New  ·  unclear etiology  ·  appears either contact or atopic dermatitis  ·  recommend patient use neutral non scented soaps and avoidance of chemicals  ·  start Kenalog 0 1% b i d  for 1 week  ·  follow-up as needed

## 2022-02-18 NOTE — ASSESSMENT & PLAN NOTE
New  ·  patient tolerated incision and drainage without difficulty or immediate complication  ·  purulent bloody discharge  ·  no surrounding erythema or concern for cellulitis  ·  allow wound to remain open and drain  ·  sterile dressing applied  ·  follow-up as needed

## 2022-02-18 NOTE — PROGRESS NOTES
Assessment/Plan:      1  Abscess around nail of left index finger  Assessment & Plan:  New  ·  patient tolerated incision and drainage without difficulty or immediate complication  ·  purulent bloody discharge  ·  no surrounding erythema or concern for cellulitis  ·  allow wound to remain open and drain  ·  sterile dressing applied  ·  follow-up as needed      2  Dermatitis  Assessment & Plan:   New  ·  unclear etiology  ·  appears either contact or atopic dermatitis  ·  recommend patient use neutral non scented soaps and avoidance of chemicals  ·  start Kenalog 0 1% b i d  for 1 week  ·  follow-up as needed            Subjective:      Patient ID: Bernarda Lopez is a 77 y o  male presents today for finger wound  Noticed a left index finger pain, swelling, and redness  He may have cut cuticle a week ago  Ongoing for 3 days and is progressively  He also has a dry erythematous rash that is mildly pruritic for the past 3 weeks on the dorsum of his fingers  Has tried ciclopirox bid for the past couple of days and it is improving  HPI    The following portions of the patient's history were reviewed and updated as appropriate: allergies, current medications, past family history, past medical history, past social history, past surgical history and problem list     Review of Systems   Constitutional: Negative for activity change, chills, fatigue and fever  HENT: Negative for congestion, hearing loss, rhinorrhea, sinus pressure, sinus pain, sneezing and sore throat  Eyes: Negative for visual disturbance  Respiratory: Negative for cough, chest tightness, shortness of breath and wheezing  Cardiovascular: Negative for chest pain, palpitations and leg swelling  Gastrointestinal: Negative for abdominal pain, blood in stool, constipation, diarrhea, nausea and vomiting  Genitourinary: Negative for difficulty urinating, dysuria, flank pain, frequency, hematuria and urgency     Musculoskeletal: Negative for back pain, myalgias and neck pain  Skin: Positive for rash (erythematous scaly rash on dorsum of bilateral hands) and wound (left index finger cuticle)  Neurological: Negative for dizziness, syncope, light-headedness, numbness and headaches  Objective:      /76 (BP Location: Left arm, Patient Position: Sitting, Cuff Size: Large)   Pulse 82   Temp 97 5 °F (36 4 °C) (Temporal)   Ht 6' 5" (1 956 m)   Wt 89 8 kg (198 lb)   SpO2 97%   BMI 23 48 kg/m²          Physical Exam  Constitutional:       General: He is not in acute distress  Appearance: He is normal weight  He is not ill-appearing, toxic-appearing or diaphoretic  HENT:      Head: Normocephalic and atraumatic  Nose: Nose normal  No congestion  Mouth/Throat:      Mouth: Mucous membranes are moist       Pharynx: Oropharynx is clear  Eyes:      General: No scleral icterus  Right eye: No discharge  Left eye: No discharge  Pulmonary:      Effort: No respiratory distress  Musculoskeletal:        Hands:       Comments: Erythematous scaly    Skin:     Coloration: Skin is not pale  Findings: No erythema  Neurological:      Mental Status: He is alert  Mental status is at baseline  Psychiatric:         Mood and Affect: Mood normal          Thought Content: Thought content normal            Incision and Drainage    Date/Time: 2/18/2022 4:28 PM  Performed by: Mir Rojas DO  Authorized by: Danish Bermudez DO   Universal Protocol:  Consent: Verbal consent obtained  Patient understanding: patient states understanding of the procedure being performed  Patient identity confirmed: verbally with patient      Patient location:  Clinic  Location:     Type:  Abscess  Pre-procedure details:     Skin preparation:  Betadine  Anesthesia (see MAR for exact dosages):      Anesthesia method:  Local infiltration    Local anesthetic:  Lidocaine 1% w/o epi  Procedure details:     Needle aspiration: no Incision types:  Stab incision    Scalpel blade:  11    Approach:  Puncture    Incision depth:  Subcutaneous    Drainage:  Bloody and purulent    Drainage amount: Moderate    Wound treatment:  Wound left open    Packing materials:  None  Post-procedure details:     Patient tolerance of procedure:   Tolerated well, no immediate complications

## 2022-02-24 ENCOUNTER — APPOINTMENT (OUTPATIENT)
Dept: LAB | Facility: CLINIC | Age: 77
End: 2022-02-24
Payer: MEDICARE

## 2022-02-24 ENCOUNTER — CONSULT (OUTPATIENT)
Dept: PULMONOLOGY | Facility: CLINIC | Age: 77
End: 2022-02-24
Payer: MEDICARE

## 2022-02-24 VITALS
HEIGHT: 77 IN | HEART RATE: 72 BPM | RESPIRATION RATE: 18 BRPM | DIASTOLIC BLOOD PRESSURE: 88 MMHG | SYSTOLIC BLOOD PRESSURE: 138 MMHG | BODY MASS INDEX: 22.91 KG/M2 | OXYGEN SATURATION: 95 % | WEIGHT: 194 LBS

## 2022-02-24 DIAGNOSIS — R93.89 ABNORMAL CT OF THE CHEST: ICD-10-CM

## 2022-02-24 DIAGNOSIS — J84.9 ILD (INTERSTITIAL LUNG DISEASE) (HCC): ICD-10-CM

## 2022-02-24 DIAGNOSIS — J84.9 ILD (INTERSTITIAL LUNG DISEASE) (HCC): Primary | ICD-10-CM

## 2022-02-24 PROCEDURE — 99205 OFFICE O/P NEW HI 60 MIN: CPT | Performed by: INTERNAL MEDICINE

## 2022-02-24 PROCEDURE — 83516 IMMUNOASSAY NONANTIBODY: CPT

## 2022-02-24 PROCEDURE — 86235 NUCLEAR ANTIGEN ANTIBODY: CPT

## 2022-02-24 PROCEDURE — 94618 PULMONARY STRESS TESTING: CPT | Performed by: INTERNAL MEDICINE

## 2022-02-24 NOTE — PROGRESS NOTES
Pulmonary Consultation   Doris Gonzalez 77 y o  male MRN: [id number]    Encounter: [id number]      Reason for consultation:   Abnormal CT scan of the chest    Requesting physician:  Duc Sanders MD       Impressions:   · Interstitial lung disease  · Cough  Recommendations:  · 6 minutes walk test per  · Complete pulmonary function test   · High-resolution CT scan of the chest   · Interstitial lung disease panel  · Follow-up after the tests are done  Discussion:  The patient has interstitial lung disease  It has progressed since 2019 based on the CT findings  The etiology is not clear  About a year ago his NADEEN was elevated  He has no rheumatologic symptoms  No history of exposure  This could be UIP  I have ordered interstitial lung disease panel  I have also ordered complete pulmonary function test and high-resolution CT scan of the chest   On the 6 minutes walk test today he desaturated from 96% to 91% however this is not clinically significant to qualify him for oxygen supplement  The patient is fairly asymptomatic apart from the cough  The cough is most likely due to his interstitial lung disease  I will see the patient in a follow-up visit after the above tests are done  History of Present Illness   HPI:  Doris Gonzalez is a 77 y o  male who is here for evaluation of abnormal CT of the chest   The patient stated that about 3 months ago he started having a cough  The cough is dry and nagging  He had CT scan of the chest done which was abnormal   He was referred to me for further evaluation  The cough is occasional   The patient stated that it is in knowing his wife  No particular triggers  For the most part he does not cough when he is asleep  The patient denies shortness of breath  He is very active and exercises on daily basis  He denies wheezing or chest tightness  Denies chest pain  Has no nocturnal symptoms  He has good appetite and denies weight loss      Review of systems:  12 point review of systems was completed and was otherwise negative except as listed in HPI  Historical Information   Past Medical History:   Diagnosis Date    Bifascicular block 3/8/2020    Asx: discovered on Ekg done 3/20 in eval of chest pain   BPH with obstruction/lower urinary tract symptoms     BPH with urinary obstruction     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     Erectile dysfunction      Past Surgical History:   Procedure Laterality Date    COLONOSCOPY      SKIN BIOPSY       Family History   Family history unknown: Yes       Family History:  No family history of chronic lung disease  Social History:  The patient is  and lives with his wife  No significant smoking history  He worked in packaging  He has no pets  No birds  Meds/Allergies   No current facility-administered medications for this visit  (Not in a hospital admission)    Allergies   Allergen Reactions    No Known Allergies     Other        Vitals: Blood pressure 138/88, pulse 72, resp  rate 18, height 6' 5" (1 956 m), weight 88 kg (194 lb), SpO2 95 %  ,      Physical exam:        Head/eyes:    Normocephalic, without obvious abnormality, atraumatic,         PERRL, extraocular muscles intact, no scleral icterus    Nose:   Nares normal, septum midline, mucosa normal, no drainage    or sinus tenderness   Throat:   Moist mucous membranes, no thrush   Neck:   Supple, trachea midline, no adenopathy; no carotid    bruit or JVD   Lungs:     Diminished breath sounds  No wheezing or rhonchi          Heart:    Regular rate and rhythm, S1 and S2 normal, no murmur, rub   or gallop   Abdomen:     Soft, non-tender, bowel sounds active all four quadrants,     no masses, no organomegaly   Extremities:   Extremities normal, atraumatic, no cyanosis or edema   Skin:   Warm, dry, turgor normal, no rashes or lesions   Neurologic:   CNII-XII intact, normal strength, non-focal             Imaging and other studies: I have personally reviewed pertinent films in PACS CT of the chest is reviewed on the [facility] system and compared to the study from 2019  He has bilateral interstitial changes with honeycombing kind of process in the lung bases  He has ground-glass opacities  Lab Results   Component Value Date    SODIUM 138 01/05/2022    K 4 1 01/05/2022     01/05/2022    CO2 28 01/05/2022    AGAP 5 01/05/2022    BUN 23 01/05/2022    CREATININE 0 86 01/05/2022    GLUF 85 01/05/2022    CALCIUM 10 0 01/05/2022    AST 22 01/05/2022    ALT 20 01/05/2022    ALKPHOS 60 01/05/2022    TP 7 3 01/05/2022    TBILI 0 93 01/05/2022    EGFR 84 01/05/2022        Ref Range & Units 4/19/21 10:56 AM   Antinuclear Abs Absent Present Abnormal     Comment: Automatic reflex NADEEN profile testing in progress  NADEEN Testing Performed by Immunofluorescent Antibody   Homogenous <80 titer 160 High     Specimen Collected: 04/19/21 10:56 AM Last Resulted: 04/20/21 10:14 PM   Received From: [facility]  Result Received: 04/21/21  4:48 PM     6 minutes walk test was done today in the office  The patient started with a rate of 78 beats per minute and O2 saturation 96%  At the end of the test the heart rate was 99 beats per minute and the O2 saturation 91%  He walked 476 m            Navi Anderson MD

## 2022-03-03 ENCOUNTER — OFFICE VISIT (OUTPATIENT)
Dept: UROLOGY | Facility: MEDICAL CENTER | Age: 77
End: 2022-03-03
Payer: MEDICARE

## 2022-03-03 VITALS
HEART RATE: 66 BPM | HEIGHT: 77 IN | DIASTOLIC BLOOD PRESSURE: 80 MMHG | BODY MASS INDEX: 22.91 KG/M2 | SYSTOLIC BLOOD PRESSURE: 130 MMHG | WEIGHT: 194 LBS

## 2022-03-03 DIAGNOSIS — N52.03 COMBINED ARTERIAL INSUFFICIENCY AND CORPORO-VENOUS OCCLUSIVE ERECTILE DYSFUNCTION: ICD-10-CM

## 2022-03-03 DIAGNOSIS — N40.1 BPH WITH OBSTRUCTION/LOWER URINARY TRACT SYMPTOMS: Primary | ICD-10-CM

## 2022-03-03 DIAGNOSIS — N13.8 BPH WITH OBSTRUCTION/LOWER URINARY TRACT SYMPTOMS: Primary | ICD-10-CM

## 2022-03-03 DIAGNOSIS — Z12.5 PROSTATE CANCER SCREENING: ICD-10-CM

## 2022-03-03 LAB
SL AMB  POCT GLUCOSE, UA: NORMAL
SL AMB LEUKOCYTE ESTERASE,UA: NORMAL
SL AMB POCT BILIRUBIN,UA: NORMAL
SL AMB POCT BLOOD,UA: NORMAL
SL AMB POCT CLARITY,UA: CLEAR
SL AMB POCT COLOR,UA: YELLOW
SL AMB POCT KETONES,UA: NORMAL
SL AMB POCT NITRITE,UA: NORMAL
SL AMB POCT PH,UA: 6.5
SL AMB POCT SPECIFIC GRAVITY,UA: 1.02
SL AMB POCT URINE PROTEIN: NORMAL
SL AMB POCT UROBILINOGEN: 0.2

## 2022-03-03 PROCEDURE — 81003 URINALYSIS AUTO W/O SCOPE: CPT | Performed by: NURSE PRACTITIONER

## 2022-03-03 PROCEDURE — 99213 OFFICE O/P EST LOW 20 MIN: CPT | Performed by: NURSE PRACTITIONER

## 2022-03-03 RX ORDER — TAMSULOSIN HYDROCHLORIDE 0.4 MG/1
0.8 CAPSULE ORAL
Qty: 180 CAPSULE | Refills: 3 | Status: SHIPPED | OUTPATIENT
Start: 2022-03-03 | End: 2022-03-10

## 2022-03-03 RX ORDER — FINASTERIDE 5 MG/1
5 TABLET, FILM COATED ORAL DAILY
Qty: 90 TABLET | Refills: 3 | Status: SHIPPED | OUTPATIENT
Start: 2022-03-03 | End: 2022-03-10

## 2022-03-03 NOTE — PROGRESS NOTES
3/3/2022      Assessment and Plan    77 y o  male managed by Dr Sergio Henry    1  Benign prostatic hyperplasia with lower urinary tract symptoms  · Urine dip in office unremarkable  · Continue tamsulosin and finasteride  · PSA performed 02/07/2022 resulted 1 8  · DOMINIQUE-prostate 35-40 g with no nodules noted  · Repeat PSA and DOMINIQUE in 1 year  · Follow-up in the office in 1 year    2  Erectile dysfunction  · Continue with Viagra      History of Present Illness  Kristen Quezada is a 77 y o  male here for follow up evaluation of  urinary symptoms secondary to benign prostatic hyperplasia  Patient has been managed on tamsulosin and finasteride with good control of urinary symptoms  He denies all lower urinary tract symptoms at this time  Reports sensation of complete bladder emptying with urination  Reports getting up 1 time a night to urinate  He denies changes to his general health since prior office evaluation  Patient reports a history of erectile dysfunction and is managed with Viagra with good results  He denies side effects of medication  Component PSA, Total   Latest Ref Rng & Units 0 0 - 4 0 ng/mL   9/11/2020 1 9   2/7/2022 1 8     Review of Systems   Constitutional: Negative for chills and fever  Respiratory: Negative for cough and shortness of breath  Cardiovascular: Negative for chest pain  Gastrointestinal: Negative for abdominal distention, abdominal pain, blood in stool, nausea and vomiting  Genitourinary: Negative for difficulty urinating, dysuria, enuresis, flank pain, frequency, hematuria and urgency  Skin: Negative for rash             AUA SYMPTOM SCORE      Most Recent Value   AUA SYMPTOM SCORE    How often have you had a sensation of not emptying your bladder completely after you finished urinating? 0 (P)     How often have you had to urinate again less than two hours after you finished urinating? 1 (P)     How often have you found you stopped and started again several times when you urinate? 0 (P)     How often have you found it difficult to postpone urination? 0 (P)     How often have you had a weak urinary stream? 1 (P)     How often have you had to push or strain to begin urination? 0 (P)     How many times did you most typically get up to urinate from the time you went to bed at night until the time you got up in the morning? 5 (P)     Quality of Life: If you were to spend the rest of your life with your urinary condition just the way it is now, how would you feel about that? 2 (P)     AUA SYMPTOM SCORE 7 (P)              Past Medical History  Past Medical History:   Diagnosis Date    Bifascicular block 3/8/2020    Asx: discovered on Ekg done 3/20 in eval of chest pain   BPH with obstruction/lower urinary tract symptoms     BPH with urinary obstruction     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     Erectile dysfunction        Past Social History  Past Surgical History:   Procedure Laterality Date    COLONOSCOPY      SKIN BIOPSY       Social History     Tobacco Use   Smoking Status Former Smoker    Packs/day: 0 50    Years: 5 00    Pack years: 2 50    Types: Cigarettes    Start date:     Quit date: 26    Years since quittin 2   Smokeless Tobacco Never Used       Past Family History  Family History   Family history unknown: Yes       Past Social history  Social History     Socioeconomic History    Marital status: /Civil Union     Spouse name: Not on file    Number of children: Not on file    Years of education: Not on file    Highest education level: Not on file   Occupational History    Not on file   Tobacco Use    Smoking status: Former Smoker     Packs/day: 0 50     Years: 5 00     Pack years: 2 50     Types: Cigarettes     Start date:      Quit date:      Years since quittin 2    Smokeless tobacco: Never Used   Vaping Use    Vaping Use: Never used   Substance and Sexual Activity    Alcohol use:  Yes Alcohol/week: 12 0 standard drinks     Types: 12 Cans of beer per week    Drug use: No    Sexual activity: Not on file   Other Topics Concern    Not on file   Social History Narrative    Not on file     Social Determinants of Health     Financial Resource Strain: Not on file   Food Insecurity: Not on file   Transportation Needs: Not on file   Physical Activity: Not on file   Stress: Not on file   Social Connections: Not on file   Intimate Partner Violence: Not on file   Housing Stability: Not on file       Current Medications  Current Outpatient Medications   Medication Sig Dispense Refill    finasteride (PROSCAR) 5 mg tablet Take 1 tablet (5 mg total) by mouth daily 90 tablet 3    Multiple Vitamin (MULTI-DAY VITAMINS) TABS Take 1 tablet by mouth daily      tamsulosin (FLOMAX) 0 4 mg Take 2 capsules (0 8 mg total) by mouth daily with dinner 180 capsule 3     No current facility-administered medications for this visit  Allergies  Allergies   Allergen Reactions    No Known Allergies     Other          The following portions of the patient's history were reviewed and updated as appropriate: allergies, current medications, past medical history, past social history, past surgical history and problem list       Vitals  Vitals:    03/03/22 1410   BP: 130/80   Pulse: 66   Weight: 88 kg (194 lb)   Height: 6' 5" (1 956 m)           Physical Exam  Physical Exam  Vitals reviewed  Constitutional:       General: He is not in acute distress  Appearance: Normal appearance  He is normal weight  HENT:      Head: Normocephalic  Eyes:      Pupils: Pupils are equal, round, and reactive to light  Cardiovascular:      Rate and Rhythm: Normal rate  Pulmonary:      Effort: No respiratory distress  Breath sounds: Normal breath sounds  Genitourinary:     Penis: Normal        Testes: Normal       Comments: DOMINIQUE-prostate 35-40 g with no nodules  Skin:     General: Skin is warm and dry     Neurological: General: No focal deficit present  Mental Status: He is alert and oriented to person, place, and time  Psychiatric:         Mood and Affect: Mood normal          Behavior: Behavior normal            Results  Recent Results (from the past 1 hour(s))   POCT urine dip auto non-scope    Collection Time: 03/03/22  2:16 PM   Result Value Ref Range     COLOR,UA yellow     CLARITY,UA clear     SPECIFIC GRAVITY,UA 1 025      PH,UA 6 5     LEUKOCYTE ESTERASE,UA neg     NITRITE,UA neg     GLUCOSE, UA neg     KETONES,UA neg     BILIRUBIN,UA neg     BLOOD,UA neg     POCT URINE PROTEIN neg     SL AMB POCT UROBILINOGEN 0 2    ]  Lab Results   Component Value Date    PSA 1 8 02/07/2022    PSA 1 9 09/11/2020     Lab Results   Component Value Date    CALCIUM 10 0 01/05/2022    K 4 1 01/05/2022    CO2 28 01/05/2022     01/05/2022    BUN 23 01/05/2022    CREATININE 0 86 01/05/2022     No results found for: WBC, HGB, HCT, MCV, PLT        Orders  Orders Placed This Encounter   Procedures    PSA, Total Screen     This is a patient instruction: This test is non-fasting  Please drink two glasses of water morning of bloodwork          Standing Status:   Future     Standing Expiration Date:   9/3/2023    POCT urine dip auto non-scope       ONIEL Jordan

## 2022-03-10 DIAGNOSIS — N40.1 BPH WITH OBSTRUCTION/LOWER URINARY TRACT SYMPTOMS: ICD-10-CM

## 2022-03-10 DIAGNOSIS — N13.8 BPH WITH OBSTRUCTION/LOWER URINARY TRACT SYMPTOMS: ICD-10-CM

## 2022-03-10 RX ORDER — TAMSULOSIN HYDROCHLORIDE 0.4 MG/1
0.8 CAPSULE ORAL
Qty: 180 CAPSULE | Refills: 3 | Status: SHIPPED | OUTPATIENT
Start: 2022-03-10

## 2022-03-10 RX ORDER — FINASTERIDE 5 MG/1
TABLET, FILM COATED ORAL
Qty: 90 TABLET | Refills: 3 | Status: SHIPPED | OUTPATIENT
Start: 2022-03-10

## 2022-03-11 ENCOUNTER — HOSPITAL ENCOUNTER (OUTPATIENT)
Dept: CT IMAGING | Facility: HOSPITAL | Age: 77
Discharge: HOME | End: 2022-03-11
Attending: INTERNAL MEDICINE
Payer: MEDICARE

## 2022-03-11 DIAGNOSIS — J84.9 ILD (INTERSTITIAL LUNG DISEASE) (HCC): ICD-10-CM

## 2022-03-11 PROCEDURE — 71250 CT THORAX DX C-: CPT

## 2022-03-11 PROCEDURE — G1004 CDSM NDSC: HCPCS

## 2022-03-14 LAB — MISCELLANEOUS LAB TEST RESULT: NORMAL

## 2022-03-16 ENCOUNTER — HOSPITAL ENCOUNTER (OUTPATIENT)
Dept: PULMONOLOGY | Facility: HOSPITAL | Age: 77
Discharge: HOME | End: 2022-03-16
Attending: INTERNAL MEDICINE
Payer: MEDICARE

## 2022-03-16 DIAGNOSIS — J84.9 ILD (INTERSTITIAL LUNG DISEASE) (HCC): ICD-10-CM

## 2022-03-16 PROCEDURE — 94729 DIFFUSING CAPACITY: CPT | Performed by: INTERNAL MEDICINE

## 2022-03-16 PROCEDURE — 94726 PLETHYSMOGRAPHY LUNG VOLUMES: CPT | Performed by: INTERNAL MEDICINE

## 2022-03-16 PROCEDURE — 94729 DIFFUSING CAPACITY: CPT

## 2022-03-16 PROCEDURE — 94760 N-INVAS EAR/PLS OXIMETRY 1: CPT

## 2022-03-16 PROCEDURE — 94726 PLETHYSMOGRAPHY LUNG VOLUMES: CPT

## 2022-03-16 PROCEDURE — 94010 BREATHING CAPACITY TEST: CPT

## 2022-03-16 PROCEDURE — 94010 BREATHING CAPACITY TEST: CPT | Performed by: INTERNAL MEDICINE

## 2022-03-31 ENCOUNTER — OFFICE VISIT (OUTPATIENT)
Dept: PULMONOLOGY | Facility: CLINIC | Age: 77
End: 2022-03-31
Payer: MEDICARE

## 2022-03-31 VITALS
OXYGEN SATURATION: 98 % | HEIGHT: 77 IN | HEART RATE: 68 BPM | BODY MASS INDEX: 22.2 KG/M2 | DIASTOLIC BLOOD PRESSURE: 70 MMHG | TEMPERATURE: 98 F | SYSTOLIC BLOOD PRESSURE: 126 MMHG | RESPIRATION RATE: 18 BRPM | WEIGHT: 188 LBS

## 2022-03-31 DIAGNOSIS — R91.1 LUNG NODULE: ICD-10-CM

## 2022-03-31 DIAGNOSIS — R05.9 COUGH: ICD-10-CM

## 2022-03-31 DIAGNOSIS — J30.89 NON-SEASONAL ALLERGIC RHINITIS, UNSPECIFIED TRIGGER: ICD-10-CM

## 2022-03-31 DIAGNOSIS — J84.9 ILD (INTERSTITIAL LUNG DISEASE) (HCC): Primary | ICD-10-CM

## 2022-03-31 PROCEDURE — 99215 OFFICE O/P EST HI 40 MIN: CPT | Performed by: INTERNAL MEDICINE

## 2022-03-31 PROCEDURE — 94010 BREATHING CAPACITY TEST: CPT | Performed by: INTERNAL MEDICINE

## 2022-03-31 RX ORDER — FLUTICASONE PROPIONATE 50 MCG
2 SPRAY, SUSPENSION (ML) NASAL DAILY
Qty: 16 G | Refills: 5 | Status: SHIPPED | OUTPATIENT
Start: 2022-03-31

## 2022-03-31 NOTE — PROGRESS NOTES
Office Progress Note - Pulmonary    Saniya Conde 77 y o  male MRN: [id number]    Encounter: [id number]      Assessment:   Interstitial lung disease   Right upper lobe nodule   Allergic rhinitis   Cough  Plan:     Spirometry   CT of the chest without IV contrast in 3 months   Saline nasal/sinus rinse once a day   Fluticasone nasal spray 2 sprays to each nostril once a day   Follow-up in 3 months  Discussion:   The patient's interstitial lung disease has progressed radiographically  This is over a period of about 3 years  It is most likely UIP  I talked to the patient about options of management at this point including starting anti fibrotic so versus monitoring him and repeat spirometry and 6 minutes walk test in 3 months  The patient opted to wait and repeat spirometry and 6 minute walk test in 3 months and then decide based on the progression of the disease  He has right upper lobe opacity that became more prominent compared to the study from 3 years ago  I have ordered a CT scan of the chest without IV contrast in 3 months to follow-up on this finding  He has cough which is due to postnasal dripping from allergic rhinitis  I have started him on saline nasal/sinus rinse  I have provided him with a bottle sample and showed him how to use it appropriately  I have also started him on fluticasone nasal spray 2 sprays to each nostril once a day  During the next visit will repeat his spirometry and 6 minutes walk test before I see him  I will see him in 3 months  Subjective: The patient is here for a follow-up visit  No significant change in his dyspnea on exertion  He has cough mainly in the morning  He has frequent postnasal dripping and the urge to clear his throat  Review of systems:  A 12 point system review is done and aside from what is stated above the rest of the review of systems is negative  Family history and social history are reviewed      Medications list is reviewed  Vitals: Blood pressure 126/70, pulse 68, temperature 98 °F (36 7 °C), resp  rate 18, height 6' 5" (1 956 m), weight 85 3 kg (188 lb), SpO2 98 %  ,     Physical Exam  Gen: Awake, alert, oriented x 3, no acute distress  HEENT: Mucous membranes moist, no oral lesions, no thrush  NECK: No accessory muscle use, JVP not elevated  Cardiac: Regular, single S1, single S2, no murmurs, no rubs, no gallops  Lungs:  Few basal crackles  No wheezing or rhonchi  Abdomen: normoactive bowel sounds, soft nontender, nondistended, no rebound or rigidity, no guarding  Extremities: no cyanosis, no clubbing, no edema  Neuro:  Grossly nonfocal   Skin:  No rash  HRCT of the chest is reviewed on the [facility] system  It showed progression of the disease when compared to the study from 2019  He has small areas of honeycombing in the lung bases  Subpleural reticulation  Right upper lobe irregular opacity is more prominent when compared to the study from 2019  Complete pulmonary function test is reviewed  It showed reduced total lung capacity  Restrictive pattern on the spirometry  Markedly reduced diffusion capacity  Normal airway resistance as indicated by the specific airway conductance  The interstitial lung disease panel was negative  Spirometry done today in the office  It showed restrictive pattern with vital capacity at 75% predicted (4 11 L)

## 2022-05-24 ENCOUNTER — TRANSCRIBE ORDERS (OUTPATIENT)
Dept: FAMILY MEDICINE CLINIC | Facility: CLINIC | Age: 77
End: 2022-05-24

## 2022-05-24 DIAGNOSIS — Z20.822 ENCOUNTER FOR LABORATORY TESTING FOR COVID-19 VIRUS: Primary | ICD-10-CM

## 2022-05-27 ENCOUNTER — TELEPHONE (OUTPATIENT)
Dept: PULMONOLOGY | Facility: CLINIC | Age: 77
End: 2022-05-27

## 2022-05-27 NOTE — TELEPHONE ENCOUNTER
Attempted 2 times to contact pt to schedule a 4M CT CHANDAN/6MWT PRIOR f/u @ our [address] office with Dr Argelia Ferrer or SERG in July or first available but both times pt picked up& hung up the phone  Will mail a reminder letter for pt to schedule

## 2022-06-25 ENCOUNTER — TRANSCRIBE ORDERS (OUTPATIENT)
Dept: FAMILY MEDICINE CLINIC | Facility: CLINIC | Age: 77
End: 2022-06-25

## 2022-06-25 DIAGNOSIS — R05.9 COUGH: Primary | ICD-10-CM

## 2022-06-25 DIAGNOSIS — R06.00 DOE (DYSPNEA ON EXERTION): ICD-10-CM

## 2022-06-29 ENCOUNTER — RA CDI HCC (OUTPATIENT)
Dept: OTHER | Facility: HOSPITAL | Age: 77
End: 2022-06-29

## 2022-06-29 NOTE — PROGRESS NOTES
[address]  coding opportunities       Chart reviewed, no opportunity found: CHART REVIEWED, NO OPPORTUNITY FOUND        Patients Insurance     Medicare Insurance: Medicare

## 2022-06-30 ENCOUNTER — APPOINTMENT (OUTPATIENT)
Dept: LAB | Facility: HOSPITAL | Age: 77
End: 2022-06-30
Payer: MEDICARE

## 2022-06-30 ENCOUNTER — HOSPITAL ENCOUNTER (OUTPATIENT)
Dept: CT IMAGING | Facility: HOSPITAL | Age: 77
Discharge: HOME | End: 2022-06-30
Attending: INTERNAL MEDICINE
Payer: MEDICARE

## 2022-06-30 DIAGNOSIS — R06.09 DOE (DYSPNEA ON EXERTION): ICD-10-CM

## 2022-06-30 DIAGNOSIS — R05.9 COUGH: ICD-10-CM

## 2022-06-30 DIAGNOSIS — R91.1 LUNG NODULE: ICD-10-CM

## 2022-06-30 LAB
ALBUMIN SERPL BCP-MCNC: 3 G/DL (ref 3.5–5)
ALP SERPL-CCNC: 62 U/L (ref 46–116)
ALT SERPL W P-5'-P-CCNC: 17 U/L (ref 12–78)
ANION GAP SERPL CALCULATED.3IONS-SCNC: 3 MMOL/L (ref 4–13)
AST SERPL W P-5'-P-CCNC: 15 U/L (ref 5–45)
BASOPHILS # BLD AUTO: 0.03 THOUSANDS/ΜL (ref 0–0.1)
BASOPHILS NFR BLD AUTO: 1 % (ref 0–1)
BILIRUB SERPL-MCNC: 0.49 MG/DL (ref 0.2–1)
BUN SERPL-MCNC: 28 MG/DL (ref 5–25)
CALCIUM ALBUM COR SERPL-MCNC: 9.6 MG/DL (ref 8.3–10.1)
CALCIUM SERPL-MCNC: 8.8 MG/DL (ref 8.3–10.1)
CHLORIDE SERPL-SCNC: 102 MMOL/L (ref 100–108)
CO2 SERPL-SCNC: 33 MMOL/L (ref 21–32)
CREAT SERPL-MCNC: 0.98 MG/DL (ref 0.6–1.3)
EOSINOPHIL # BLD AUTO: 0.21 THOUSAND/ΜL (ref 0–0.61)
EOSINOPHIL NFR BLD AUTO: 3 % (ref 0–6)
ERYTHROCYTE [DISTWIDTH] IN BLOOD BY AUTOMATED COUNT: 12.6 % (ref 11.6–15.1)
GFR SERPL CREATININE-BSD FRML MDRD: 74 ML/MIN/1.73SQ M
GLUCOSE SERPL-MCNC: 103 MG/DL (ref 65–140)
HCT VFR BLD AUTO: 42.3 % (ref 36.5–49.3)
HGB BLD-MCNC: 13.8 G/DL (ref 12–17)
IMM GRANULOCYTES # BLD AUTO: 0.01 THOUSAND/UL (ref 0–0.2)
IMM GRANULOCYTES NFR BLD AUTO: 0 % (ref 0–2)
LYMPHOCYTES # BLD AUTO: 0.96 THOUSANDS/ΜL (ref 0.6–4.47)
LYMPHOCYTES NFR BLD AUTO: 15 % (ref 14–44)
MCH RBC QN AUTO: 33.5 PG (ref 26.8–34.3)
MCHC RBC AUTO-ENTMCNC: 32.6 G/DL (ref 31.4–37.4)
MCV RBC AUTO: 103 FL (ref 82–98)
MONOCYTES # BLD AUTO: 0.42 THOUSAND/ΜL (ref 0.17–1.22)
MONOCYTES NFR BLD AUTO: 6 % (ref 4–12)
NEUTROPHILS # BLD AUTO: 4.93 THOUSANDS/ΜL (ref 1.85–7.62)
NEUTS SEG NFR BLD AUTO: 75 % (ref 43–75)
NRBC BLD AUTO-RTO: 0 /100 WBCS
NT-PROBNP SERPL-MCNC: 187 PG/ML
PLATELET # BLD AUTO: 252 THOUSANDS/UL (ref 149–390)
PMV BLD AUTO: 8.5 FL (ref 8.9–12.7)
POTASSIUM SERPL-SCNC: 4.2 MMOL/L (ref 3.5–5.3)
PROT SERPL-MCNC: 8.1 G/DL (ref 6.4–8.2)
RBC # BLD AUTO: 4.12 MILLION/UL (ref 3.88–5.62)
SODIUM SERPL-SCNC: 138 MMOL/L (ref 136–145)
TSH SERPL DL<=0.05 MIU/L-ACNC: 4.47 UIU/ML (ref 0.45–4.5)
WBC # BLD AUTO: 6.56 THOUSAND/UL (ref 4.31–10.16)

## 2022-06-30 PROCEDURE — 84443 ASSAY THYROID STIM HORMONE: CPT

## 2022-06-30 PROCEDURE — 85025 COMPLETE CBC W/AUTO DIFF WBC: CPT

## 2022-06-30 PROCEDURE — 71250 CT THORAX DX C-: CPT

## 2022-06-30 PROCEDURE — 80053 COMPREHEN METABOLIC PANEL: CPT

## 2022-06-30 PROCEDURE — G1004 CDSM NDSC: HCPCS

## 2022-06-30 PROCEDURE — 36415 COLL VENOUS BLD VENIPUNCTURE: CPT

## 2022-06-30 PROCEDURE — 83880 ASSAY OF NATRIURETIC PEPTIDE: CPT

## 2022-07-06 ENCOUNTER — TELEMEDICINE (OUTPATIENT)
Dept: FAMILY MEDICINE CLINIC | Facility: CLINIC | Age: 77
End: 2022-07-06

## 2022-07-06 DIAGNOSIS — R05.9 COUGH: Primary | ICD-10-CM

## 2022-07-06 NOTE — PROGRESS NOTES
Virtual Brief Visit    Patient is located in the following state in which I hold an active license [place]      Assessment/Plan: this visit is cancelled     Problem List Items Addressed This Visit    None         Recent Visits  No visits were found meeting these conditions  Showing recent visits within past 7 days and meeting all other requirements  Today's Visits  Date Type Provider Dept   07/06/22 Telemedicine Lanny Fragoso  [facility] today's visits and meeting all other requirements  Future Appointments  No visits were found meeting these conditions    Showing future appointments within next 150 days and meeting all other requirements         I spent 1 minutes directly with the patient during this visit

## 2022-07-07 ENCOUNTER — OFFICE VISIT (OUTPATIENT)
Dept: FAMILY MEDICINE CLINIC | Facility: CLINIC | Age: 77
End: 2022-07-07
Payer: MEDICARE

## 2022-07-07 VITALS
HEIGHT: 77 IN | HEART RATE: 83 BPM | DIASTOLIC BLOOD PRESSURE: 84 MMHG | WEIGHT: 179 LBS | RESPIRATION RATE: 14 BRPM | BODY MASS INDEX: 21.13 KG/M2 | OXYGEN SATURATION: 92 % | TEMPERATURE: 98.7 F | SYSTOLIC BLOOD PRESSURE: 130 MMHG

## 2022-07-07 DIAGNOSIS — R63.4 WEIGHT LOSS: Primary | ICD-10-CM

## 2022-07-07 PROBLEM — U07.1 COVID-19: Chronic | Status: ACTIVE | Noted: 2022-07-07

## 2022-07-07 PROBLEM — L03.012: Status: RESOLVED | Noted: 2022-02-18 | Resolved: 2022-07-07

## 2022-07-07 PROCEDURE — 99214 OFFICE O/P EST MOD 30 MIN: CPT | Performed by: FAMILY MEDICINE

## 2022-07-07 NOTE — PROGRESS NOTES
Assessment/Plan:    No problem-specific Assessment & Plan notes found for this encounter  There are no diagnoses linked to this encounter  Subjective:      Patient ID: Kevin Simpson is a 69 y o  male  Weight loss; reduced energy ; SOB slowly worsning; chronic cough; still able to walk 2-3 miles   The following portions of the patient's history were reviewed and updated as appropriate: allergies, current medications, past family history, past medical history, past social history, past surgical history and problem list     Review of Systems   Constitutional: Positive for fatigue and unexpected weight change  Negative for activity change, appetite change and fever  HENT: Negative for trouble swallowing  Eyes: Negative for visual disturbance  Respiratory: Positive for cough and shortness of breath  Cardiovascular: Negative for chest pain, palpitations and leg swelling  Gastrointestinal: Negative for abdominal pain and blood in stool  Endocrine: Negative for polyuria  Genitourinary: Negative for difficulty urinating and hematuria  Skin: Negative for rash  Neurological: Negative for dizziness  Psychiatric/Behavioral: Negative for behavioral problems  Objective:  Vitals:    07/07/22 1603   BP: 130/84   BP Location: Left arm   Patient Position: Sitting   Cuff Size: Standard   Pulse: 83   Resp: 14   Temp: 98 7 °F (37 1 °C)   TempSrc: Tympanic   SpO2: 92%   Weight: 81 2 kg (179 lb)   Height: 6' 5" (1 956 m)      Physical Exam  Constitutional:       Appearance: He is well-developed  HENT:      Head: Normocephalic and atraumatic  Eyes:      Conjunctiva/sclera: Conjunctivae normal    Neck:      Thyroid: No thyromegaly  Cardiovascular:      Rate and Rhythm: Normal rate and regular rhythm  Heart sounds: Normal heart sounds  No murmur heard  Pulmonary:      Effort: Pulmonary effort is normal  No respiratory distress  Breath sounds: Normal breath sounds  Musculoskeletal:      Cervical back: Neck supple  Lymphadenopathy:      Cervical: No cervical adenopathy  Skin:     General: Skin is warm and dry  Psychiatric:         Behavior: Behavior normal            I believe current weight loss and low energy is a reflection of some prolonged COVID symptoms superimposed on his recent diagnosis of interstitial lung disease  He denies being frankly depressed but has been under a lot of stress recently with some spousal illness and family issues  At this point I encouraged him on good nutrition I do not think any other studies are warranted as his exam and lab workup has been unrevealing fortunately his BMI is still normal and nutritional evaluation does not show any significant malnutrition

## 2022-07-11 ENCOUNTER — TRANSCRIBE ORDERS (OUTPATIENT)
Dept: FAMILY MEDICINE CLINIC | Facility: CLINIC | Age: 77
End: 2022-07-11

## 2022-07-11 DIAGNOSIS — R05.9 COUGH: Primary | ICD-10-CM

## 2022-07-11 RX ORDER — GUAIFENESIN AND CODEINE PHOSPHATE 100; 10 MG/5ML; MG/5ML
SOLUTION ORAL
Qty: 120 ML | Refills: 5 | Status: SHIPPED | OUTPATIENT
Start: 2022-07-11

## 2022-07-13 ENCOUNTER — OFFICE VISIT (OUTPATIENT)
Dept: PULMONOLOGY | Facility: CLINIC | Age: 77
End: 2022-07-13
Payer: MEDICARE

## 2022-07-13 VITALS
BODY MASS INDEX: 21.39 KG/M2 | SYSTOLIC BLOOD PRESSURE: 122 MMHG | TEMPERATURE: 97.2 F | WEIGHT: 180.4 LBS | DIASTOLIC BLOOD PRESSURE: 64 MMHG | OXYGEN SATURATION: 97 % | HEART RATE: 86 BPM

## 2022-07-13 DIAGNOSIS — J30.89 NON-SEASONAL ALLERGIC RHINITIS, UNSPECIFIED TRIGGER: ICD-10-CM

## 2022-07-13 DIAGNOSIS — J84.9 ILD (INTERSTITIAL LUNG DISEASE) (HCC): Primary | ICD-10-CM

## 2022-07-13 DIAGNOSIS — R91.1 LUNG NODULE: ICD-10-CM

## 2022-07-13 DIAGNOSIS — R05.9 COUGH: ICD-10-CM

## 2022-07-13 PROCEDURE — 99215 OFFICE O/P EST HI 40 MIN: CPT | Performed by: INTERNAL MEDICINE

## 2022-07-13 PROCEDURE — 94618 PULMONARY STRESS TESTING: CPT | Performed by: INTERNAL MEDICINE

## 2022-07-13 PROCEDURE — 94010 BREATHING CAPACITY TEST: CPT | Performed by: INTERNAL MEDICINE

## 2022-07-13 RX ORDER — FEXOFENADINE HCL 180 MG/1
180 TABLET ORAL DAILY
Qty: 30 TABLET | Refills: 3 | Status: SHIPPED | OUTPATIENT
Start: 2022-07-13 | End: 2022-10-05

## 2022-07-13 NOTE — PROGRESS NOTES
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   Abnormal CT scan of the chest with right upper lobe and right lower lobe nodules as well as left upper lobe nodule   Recent COVID-19 infection   Allergic rhinitis   Dyspnea on exertion   Cough  Plan:     6 minutes walk test    Spirometry   Saline nasal/sinus rinse once a day   Fexofenadine 180 mg once a day   Fluticasone nasal spray 2 sprays to each nostril once a day   CT scan of the chest without IV contrast in 6 months   Follow-up in 3 months  Discussion:   The patient's symptoms of dyspnea on exertion is mainly due to his deconditioning from the COVID-19 infection  He has recovered  His 6 minutes walk test showed no significant change compared to the prior test from the end of March  The difference in the vital capacity on the spirometry most likely due to the difficulty that the patient had today  Again we talked today about options of treatment and the patient and his daughter who was accompanying him elected to wait for another 3 months to see what is the trend of his disease as we monitor his symptoms and repeat the 6 minutes walk test and the spirometry during the next visit  His cough is mainly due to postnasal dripping from allergic rhinitis  I have told him to restart the nasal saline/sinus rinse every night for the next 5-7 days  Also to restart the fluticasone nasal spray 2 sprays to each nostril once a day and prescribed fexofenadine 180 mg once a day  During the next visit I will ordered a CT scan of the chest to be done in December  I will see him in 3 months with a repeat of the 6 minutes walk test and spirometry  Subjective: The patient is here for a follow-up visit  Few weeks ago he had COVID after he was on a cruise  Both him and his wife had it  He felt very tired and had no energy  Also he lost his appetite  About a week ago his wife was diagnosed with multiple myeloma  He is very upset about that  Today he is accompanied by his daughter  The patient has postnasal dripping with the urge to frequently clear his throat  Also has a cough  He denies nocturnal symptoms  Denies chest pain, palpitations or dizziness  Review of systems:  A 12 point system review is done and aside from what is stated above the rest of the review of systems is negative  Family history and social history are reviewed  Medications list is reviewed  Vitals: Blood pressure 122/64, pulse 86, temperature (!) 97 2 °F (36 2 °C), weight 81 8 kg (180 lb 6 4 oz), SpO2 97 %  ,     Physical Exam  Gen: Awake, alert, oriented x 3, no acute distress  HEENT: Mucous membranes moist, no oral lesions, no thrush  NECK: No accessory muscle use, JVP not elevated  Cardiac: Regular, single S1, single S2, no murmurs, no rubs, no gallops  Lungs:  Right basal crackles  No wheezing or rhonchi  Abdomen: normoactive bowel sounds, soft nontender, nondistended, no rebound or rigidity, no guarding  Extremities: no cyanosis, no clubbing, no edema  Neuro:  Grossly nonfocal   Skin:  No rash  CT scan of the chest is reviewed on the [facility] system  It showed stable interstitial fibrosis  Stable right upper lobe, right lower lobe and left upper lobe nodules  Lab Results   Component Value Date    WBC 6 56 06/30/2022    HGB 13 8 06/30/2022    HCT 42 3 06/30/2022     (H) 06/30/2022     06/30/2022     Lab Results   Component Value Date    SODIUM 138 06/30/2022    K 4 2 06/30/2022     06/30/2022    CO2 33 (H) 06/30/2022    BUN 28 (H) 06/30/2022    CREATININE 0 98 06/30/2022    GLUC 103 06/30/2022    CALCIUM 8 8 06/30/2022     6 minute walk test was done today in the office  The patient started with a heart rate of 69 beats per minute and O2 saturation 95%    At the end of the test the heart rate was 90 beats per minute and the O2 saturation 90%  The patient walked 308 m  Spirometry was done today in the office  The patient had difficulty with testing  It showed restrictive pattern    His vital capacity was 3 78 L compared to 4 11 L back on March 31st

## 2022-08-18 ENCOUNTER — OFFICE VISIT (OUTPATIENT)
Dept: PULMONOLOGY | Facility: CLINIC | Age: 77
End: 2022-08-18
Payer: MEDICARE

## 2022-08-18 ENCOUNTER — APPOINTMENT (OUTPATIENT)
Dept: RADIOLOGY | Facility: MEDICAL CENTER | Age: 77
End: 2022-08-18
Payer: MEDICARE

## 2022-08-18 ENCOUNTER — APPOINTMENT (OUTPATIENT)
Dept: LAB | Facility: MEDICAL CENTER | Age: 77
End: 2022-08-18
Payer: MEDICARE

## 2022-08-18 VITALS
TEMPERATURE: 97.8 F | HEART RATE: 73 BPM | SYSTOLIC BLOOD PRESSURE: 124 MMHG | BODY MASS INDEX: 20.03 KG/M2 | HEIGHT: 77 IN | RESPIRATION RATE: 16 BRPM | DIASTOLIC BLOOD PRESSURE: 64 MMHG | WEIGHT: 169.6 LBS | OXYGEN SATURATION: 92 %

## 2022-08-18 DIAGNOSIS — R91.1 LUNG NODULE: ICD-10-CM

## 2022-08-18 DIAGNOSIS — R06.09 DOE (DYSPNEA ON EXERTION): ICD-10-CM

## 2022-08-18 DIAGNOSIS — R63.4 WEIGHT LOSS: ICD-10-CM

## 2022-08-18 DIAGNOSIS — J84.9 ILD (INTERSTITIAL LUNG DISEASE) (HCC): ICD-10-CM

## 2022-08-18 DIAGNOSIS — J30.89 NON-SEASONAL ALLERGIC RHINITIS, UNSPECIFIED TRIGGER: ICD-10-CM

## 2022-08-18 DIAGNOSIS — J84.9 ILD (INTERSTITIAL LUNG DISEASE) (HCC): Primary | ICD-10-CM

## 2022-08-18 LAB
ALBUMIN SERPL BCP-MCNC: 2.8 G/DL (ref 3.5–5)
ALP SERPL-CCNC: 71 U/L (ref 46–116)
ALT SERPL W P-5'-P-CCNC: 17 U/L (ref 12–78)
ANION GAP SERPL CALCULATED.3IONS-SCNC: 3 MMOL/L (ref 4–13)
AST SERPL W P-5'-P-CCNC: 19 U/L (ref 5–45)
BILIRUB SERPL-MCNC: 0.37 MG/DL (ref 0.2–1)
BUN SERPL-MCNC: 26 MG/DL (ref 5–25)
CALCIUM ALBUM COR SERPL-MCNC: 10.4 MG/DL (ref 8.3–10.1)
CALCIUM SERPL-MCNC: 9.4 MG/DL (ref 8.3–10.1)
CHLORIDE SERPL-SCNC: 107 MMOL/L (ref 96–108)
CO2 SERPL-SCNC: 30 MMOL/L (ref 21–32)
CREAT SERPL-MCNC: 0.82 MG/DL (ref 0.6–1.3)
GFR SERPL CREATININE-BSD FRML MDRD: 85 ML/MIN/1.73SQ M
GLUCOSE P FAST SERPL-MCNC: 98 MG/DL (ref 65–99)
POTASSIUM SERPL-SCNC: 4.2 MMOL/L (ref 3.5–5.3)
PROT SERPL-MCNC: 8.3 G/DL (ref 6.4–8.4)
SODIUM SERPL-SCNC: 140 MMOL/L (ref 135–147)

## 2022-08-18 PROCEDURE — 71046 X-RAY EXAM CHEST 2 VIEWS: CPT

## 2022-08-18 PROCEDURE — 80053 COMPREHEN METABOLIC PANEL: CPT

## 2022-08-18 PROCEDURE — 36415 COLL VENOUS BLD VENIPUNCTURE: CPT

## 2022-08-18 PROCEDURE — 94618 PULMONARY STRESS TESTING: CPT | Performed by: INTERNAL MEDICINE

## 2022-08-18 PROCEDURE — 99215 OFFICE O/P EST HI 40 MIN: CPT | Performed by: INTERNAL MEDICINE

## 2022-08-18 NOTE — PROGRESS NOTES
Office Progress Note - Pulmonary    Jumana Hernandes 69 y o  male MRN: [id number]    Encounter: [id number]      Assessment:   Worsening dyspnea on exertion   Chronic hypoxemic respiratory failure   Interstitial lung disease consistent with IPF   Lung nodule   Allergic rhinitis  Plan:     6 minutes walk test    Oxygen supplement with activities   Chest x-ray PA and lateral    2D echo   Fluticasone nasal spray 2 sprays to each nostril once a day   Follow-up after the studies are done  Discussion:   The patient has worsening dyspnea on exertion  Also developed hypoxemia  A month ago he did not desaturate on the 6 minute walk test   I have ordered a chest x-ray PA and lateral to rule out an acute process  Also ordered 2D echo to rule out any underlying cardiac dysfunction may be contributing to this it dyspnea and hypoxemia  I have ordered home oxygen to be used with activities  Hopefully will get a portable oxygen concentrator soon  I have maintained him on the fluticasone nasal spray 2 sprays to each nostril for his allergic rhinitis  I will see him as soon as he has the above studies are done  Subjective: The patient is seen today because he has worsening dyspnea on exertion  Also he noticed that his O2 saturation drops with activities to the mid 70s  Today he was very upset because his wife who was recently diagnosed with multiple myeloma was readmitted to the hospital yesterday  She was having diarrhea and then slowly became weak  He tells me that since he had COVID he was struggling with gaining weight  He noticed that he has worsening shortness of breath on exertion  He has a pulse ox and noticed that his O2 saturation drops to the mid 70s  At rest his O2 saturation is in the low to mid 90s  He has a cough producing whitish sputum  Denies chest pain or palpitations  No dizziness  No nocturnal symptoms      Review of systems:  A 12 point system review is done and aside from what is stated above the rest of the review of systems is negative  Family history and social history are reviewed  Medications list is reviewed  Vitals: Blood pressure 124/64, pulse 73, temperature 97 8 °F (36 6 °C), resp  rate 16, height 6' 5" (1 956 m), weight 76 9 kg (169 lb 9 6 oz), SpO2 92 %  ,     Physical Exam  Gen: Awake, alert, oriented x 3, no acute distress  HEENT: Mucous membranes moist, no oral lesions, no thrush  NECK: No accessory muscle use, JVP not elevated  Cardiac: Regular, single S1, single S2, no murmurs, no rubs, no gallops  Lungs:  Bilateral posterior crackles  No wheezing or rhonchi  Abdomen: normoactive bowel sounds, soft nontender, nondistended, no rebound or rigidity, no guarding  Extremities: no cyanosis, no clubbing, no edema  Neuro:  Grossly nonfocal   Skin:  No rash  6 minutes walk test was done today in the office  The patient started with a heart rate of 78 beats per minute and O2 saturation 94%  On the 3rd minute his O2 saturation dropped to 87%  He was placed on oxygen and continued the walk test   At the end of the test his heart rate was 93 beats per minute and O2 saturation 96%  He walked 135 m

## 2022-08-19 ENCOUNTER — DOCUMENTATION (OUTPATIENT)
Dept: PULMONOLOGY | Facility: CLINIC | Age: 77
End: 2022-08-19

## 2022-09-17 ENCOUNTER — HOSPITAL ENCOUNTER (OUTPATIENT)
Dept: NON INVASIVE DIAGNOSTICS | Facility: HOSPITAL | Age: 77
Discharge: HOME | End: 2022-09-17
Attending: INTERNAL MEDICINE
Payer: MEDICARE

## 2022-09-17 VITALS
WEIGHT: 169 LBS | HEART RATE: 73 BPM | BODY MASS INDEX: 19.96 KG/M2 | HEIGHT: 77 IN | SYSTOLIC BLOOD PRESSURE: 124 MMHG | DIASTOLIC BLOOD PRESSURE: 64 MMHG

## 2022-09-17 DIAGNOSIS — R06.09 DOE (DYSPNEA ON EXERTION): ICD-10-CM

## 2022-09-17 LAB
AORTIC ROOT: 4.2 CM
AORTIC VALVE MEAN VELOCITY: 5.8 M/S
APICAL FOUR CHAMBER EJECTION FRACTION: 52 %
ASCENDING AORTA: 3.6 CM
AV LVOT MEAN GRADIENT: 1 MMHG
AV LVOT PEAK GRADIENT: 2 MMHG
AV MEAN GRADIENT: 2 MMHG
AV PEAK GRADIENT: 3 MMHG
AV VELOCITY RATIO: 0.93
DOP CALC AO PEAK VEL: 0.82 M/S
DOP CALC AO VTI: 18.11 CM
DOP CALC LVOT PEAK VEL VTI: 18.38 CM
DOP CALC LVOT PEAK VEL: 0.76 M/S
E WAVE DECELERATION TIME: 253 MS
FRACTIONAL SHORTENING: 31 (ref 28–44)
INTERVENTRICULAR SEPTUM IN DIASTOLE (PARASTERNAL SHORT AXIS VIEW): 0.9 CM
INTERVENTRICULAR SEPTUM: 0.9 CM (ref 0.6–1.1)
IVC: 2.5 MM
LAAS-AP2: 20.4 CM2
LAAS-AP4: 13.1 CM2
LEFT ATRIUM SIZE: 3.1 CM
LEFT INTERNAL DIMENSION IN SYSTOLE: 2.9 CM (ref 2.1–4)
LEFT VENTRICULAR INTERNAL DIMENSION IN DIASTOLE: 4.2 CM (ref 3.5–6)
LEFT VENTRICULAR POSTERIOR WALL IN END DIASTOLE: 0.9 CM
LEFT VENTRICULAR STROKE VOLUME: 45 ML
LVSV (TEICH): 45 ML
MV E'TISSUE VEL-SEP: 4 CM/S
MV PEAK A VEL: 0.84 M/S
MV PEAK E VEL: 43 CM/S
MV STENOSIS PRESSURE HALF TIME: 73 MS
MV VALVE AREA P 1/2 METHOD: 3.01
RA PRESSURE ESTIMATED: 15 MMHG
RIGHT VENTRICLE ID DIMENSION: 4 CM
RV PSP: 72 MMHG
SL CV LEFT ATRIUM LENGTH A2C: 5.1 CM
SL CV LV EF: 50
SL CV PED ECHO LEFT VENTRICLE DIASTOLIC VOLUME (MOD BIPLANE) 2D: 77 ML
SL CV PED ECHO LEFT VENTRICLE SYSTOLIC VOLUME (MOD BIPLANE) 2D: 32 ML
TR MAX PG: 57 MMHG
TR PEAK VELOCITY: 3.8 M/S
TRICUSPID VALVE PEAK REGURGITATION VELOCITY: 3.79 M/S

## 2022-09-17 PROCEDURE — 93306 TTE W/DOPPLER COMPLETE: CPT

## 2022-09-17 PROCEDURE — 93306 TTE W/DOPPLER COMPLETE: CPT | Performed by: STUDENT IN AN ORGANIZED HEALTH CARE EDUCATION/TRAINING PROGRAM

## 2022-09-22 ENCOUNTER — DOCUMENTATION (OUTPATIENT)
Dept: PULMONOLOGY | Facility: CLINIC | Age: 77
End: 2022-09-22

## 2022-09-22 ENCOUNTER — OFFICE VISIT (OUTPATIENT)
Dept: PULMONOLOGY | Facility: CLINIC | Age: 77
End: 2022-09-22
Payer: MEDICARE

## 2022-09-22 VITALS
HEART RATE: 84 BPM | OXYGEN SATURATION: 90 % | TEMPERATURE: 97.8 F | HEIGHT: 77 IN | BODY MASS INDEX: 23.26 KG/M2 | DIASTOLIC BLOOD PRESSURE: 60 MMHG | SYSTOLIC BLOOD PRESSURE: 120 MMHG | WEIGHT: 197 LBS

## 2022-09-22 DIAGNOSIS — G47.33 OSA (OBSTRUCTIVE SLEEP APNEA): ICD-10-CM

## 2022-09-22 DIAGNOSIS — J30.89 NON-SEASONAL ALLERGIC RHINITIS, UNSPECIFIED TRIGGER: ICD-10-CM

## 2022-09-22 DIAGNOSIS — R06.09 DOE (DYSPNEA ON EXERTION): ICD-10-CM

## 2022-09-22 DIAGNOSIS — J96.11 CHRONIC HYPOXEMIC RESPIRATORY FAILURE (HCC): ICD-10-CM

## 2022-09-22 DIAGNOSIS — J84.9 ILD (INTERSTITIAL LUNG DISEASE) (HCC): Primary | ICD-10-CM

## 2022-09-22 PROCEDURE — 99215 OFFICE O/P EST HI 40 MIN: CPT | Performed by: INTERNAL MEDICINE

## 2022-09-22 PROCEDURE — 94010 BREATHING CAPACITY TEST: CPT | Performed by: INTERNAL MEDICINE

## 2022-09-22 NOTE — PROGRESS NOTES
Office Progress Note - Pulmonary    Naeem Mills 69 y o  male MRN: [id number]    Encounter: [id number]      Assessment:   Interstitial lung disease consistent with idiopathic pulmonary fibrosis   Chronic hypoxemic respiratory failure   Pulmonary hypertension   Dyspnea on exertion   Allergic rhinitis   Lung nodule    Plan:     Spirometry   HRCT of the chest    Split sleep study   Pulmonary rehab referral    Will initiate Ofev   Saline nasal/sinus rinse every night   Fluticasone nasal spray 2 sprays to each nostril once a day   Follow-up in 4 weeks  Discussion:   The patient has interstitial lung disease which is consistent with idiopathic pulmonary fibrosis is progressively worsening  The patient lost significant vital capacity over a L since the end of March  Also his dyspnea on exertion worsened and developed chronic hypoxemic respiratory failure  His 2D echo showed severe pulmonary hypertension  In my opinion this is out of proportion to the lung disease and the untreated obstructive sleep apnea  The patient admitted that he had obstructive sleep apnea diagnosed years ago and he was supposed to be on CPAP  He is symptomatic and he has witnessed apneas  Also asymptomatic during the day and feeling tired  These are contributing factors to his symptoms  To better evaluate his pulmonary hypertension he may need right heart catheterization  I will discuss with Dr Roberto Carlos Ryan and made the referral if it is appropriate  I have ordered a split sleep study to initiate his obstructive sleep apnea therapy  I have ordered high-resolution CT scan to document interval worsening of the IPF  I have referred him to the pulmonary rehab to improve his stamina  We talked about anti fibrotic aches and I recommended that we should initiate them  The patient and his daughter will read about the medication and side effects and then decide if he wanted go on Ofev    His allergic rhinitis is symptomatic  I have told him that he should start using the saline nasal/sinus rinse in the evening  I will see him in 4 weeks in a follow-up visit  Subjective: The patient is here for a follow-up visit  His dyspnea on exertion is worsening  He is limited in his activities  He is using the oxygen with activities  He has a cough producing whitish sputum  He is using the oxygen  He also notes to become dizzy when he is exerting himself  When he checks his O2 saturation usually between low 80s to low 90%  He denies any chest pain  He has fair appetite and he is forcing himself to eat  He lost his wife about a month ago  He is trying to be active and schedules outdoor social activities with his friends  No nocturnal symptoms  Review of systems:  A 12 point system review is done and aside from what is stated above the rest of the review of systems is negative  Family history and social history are reviewed  Medications list is reviewed  Vitals: Blood pressure 120/60, pulse 84, temperature 97 8 °F (36 6 °C), temperature source Tympanic, height 6' 5" (1 956 m), weight 89 4 kg (197 lb), SpO2 90 %  ,     Physical Exam  Gen: Awake, alert, oriented x 3, no acute distress  HEENT: Mucous membranes moist, no oral lesions, no thrush  NECK: No accessory muscle use, JVP not elevated  Cardiac: Regular, single S1, single S2, no murmurs, no rubs, no gallops  Lungs:  Posterior crackles more on the bases  No wheezing  Abdomen: normoactive bowel sounds, soft nontender, nondistended, no rebound or rigidity, no guarding  Extremities: no cyanosis, no clubbing, no edema  Neuro:  Grossly nonfocal   Skin:  No rash  2D echo report is reviewed  It showed EF of 50%  Grade 1 diastolic dysfunction  His RV is mildly dilated  Estimated RV systolic pressure 72  He has moderate tricuspid regurgitation      Lab Results   Component Value Date    SODIUM 140 08/18/2022    K 4 2 08/18/2022     08/18/2022 CO2 30 08/18/2022    BUN 26 (H) 08/18/2022    CREATININE 0 82 08/18/2022    GLUC 103 06/30/2022    CALCIUM 9 4 08/18/2022     Spirometry today showed restrictive lung disease  His vital capacity dropped from 4 11 on March 30th down to 2 9 today

## 2022-09-28 ENCOUNTER — TELEPHONE (OUTPATIENT)
Dept: CARDIAC REHAB | Facility: CLINIC | Age: 77
End: 2022-09-28

## 2022-09-28 DIAGNOSIS — J84.9 ILD (INTERSTITIAL LUNG DISEASE) (HCC): Primary | ICD-10-CM

## 2022-09-28 NOTE — PROGRESS NOTES
Patient is calling in regards to the OFEV medication he was prescribed  He said he would like to speak with someone about it, that he has questions   Please advise

## 2022-09-28 NOTE — PROGRESS NOTES
Script request sent to Dr Jorge L Armstrong  Called patient back made him aware that [facility] should be reaching out in a few days about the co-pay and delivery

## 2022-09-29 ENCOUNTER — CLINICAL SUPPORT (OUTPATIENT)
Dept: PULMONOLOGY | Facility: CLINIC | Age: 77
End: 2022-09-29
Payer: MEDICARE

## 2022-09-29 ENCOUNTER — DOCUMENTATION (OUTPATIENT)
Dept: FAMILY MEDICINE CLINIC | Facility: CLINIC | Age: 77
End: 2022-09-29

## 2022-09-29 ENCOUNTER — APPOINTMENT (OUTPATIENT)
Dept: LAB | Facility: MEDICAL CENTER | Age: 77
End: 2022-09-29
Payer: MEDICARE

## 2022-09-29 DIAGNOSIS — R63.4 WEIGHT LOSS: Primary | ICD-10-CM

## 2022-09-29 DIAGNOSIS — J84.9 ILD (INTERSTITIAL LUNG DISEASE) (HCC): Primary | ICD-10-CM

## 2022-09-29 LAB
ALBUMIN SERPL BCP-MCNC: 2.8 G/DL (ref 3.5–5)
ALP SERPL-CCNC: 72 U/L (ref 46–116)
ALT SERPL W P-5'-P-CCNC: 14 U/L (ref 12–78)
ANION GAP SERPL CALCULATED.3IONS-SCNC: 4 MMOL/L (ref 4–13)
AST SERPL W P-5'-P-CCNC: 19 U/L (ref 5–45)
BASOPHILS # BLD AUTO: 0.05 THOUSANDS/ΜL (ref 0–0.1)
BASOPHILS NFR BLD AUTO: 1 % (ref 0–1)
BILIRUB SERPL-MCNC: 0.57 MG/DL (ref 0.2–1)
BUN SERPL-MCNC: 21 MG/DL (ref 5–25)
CALCIUM ALBUM COR SERPL-MCNC: 9.8 MG/DL (ref 8.3–10.1)
CALCIUM SERPL-MCNC: 8.8 MG/DL (ref 8.3–10.1)
CHLORIDE SERPL-SCNC: 105 MMOL/L (ref 96–108)
CO2 SERPL-SCNC: 28 MMOL/L (ref 21–32)
CREAT SERPL-MCNC: 0.94 MG/DL (ref 0.6–1.3)
EOSINOPHIL # BLD AUTO: 0.28 THOUSAND/ΜL (ref 0–0.61)
EOSINOPHIL NFR BLD AUTO: 3 % (ref 0–6)
ERYTHROCYTE [DISTWIDTH] IN BLOOD BY AUTOMATED COUNT: 12.3 % (ref 11.6–15.1)
GFR SERPL CREATININE-BSD FRML MDRD: 78 ML/MIN/1.73SQ M
GLUCOSE P FAST SERPL-MCNC: 134 MG/DL (ref 65–99)
HCT VFR BLD AUTO: 42.2 % (ref 36.5–49.3)
HGB BLD-MCNC: 13.5 G/DL (ref 12–17)
IMM GRANULOCYTES # BLD AUTO: 0.03 THOUSAND/UL (ref 0–0.2)
IMM GRANULOCYTES NFR BLD AUTO: 0 % (ref 0–2)
LYMPHOCYTES # BLD AUTO: 1.04 THOUSANDS/ΜL (ref 0.6–4.47)
LYMPHOCYTES NFR BLD AUTO: 12 % (ref 14–44)
MCH RBC QN AUTO: 32.6 PG (ref 26.8–34.3)
MCHC RBC AUTO-ENTMCNC: 32 G/DL (ref 31.4–37.4)
MCV RBC AUTO: 102 FL (ref 82–98)
MONOCYTES # BLD AUTO: 0.53 THOUSAND/ΜL (ref 0.17–1.22)
MONOCYTES NFR BLD AUTO: 6 % (ref 4–12)
NEUTROPHILS # BLD AUTO: 6.71 THOUSANDS/ΜL (ref 1.85–7.62)
NEUTS SEG NFR BLD AUTO: 78 % (ref 43–75)
NRBC BLD AUTO-RTO: 0 /100 WBCS
PLATELET # BLD AUTO: 311 THOUSANDS/UL (ref 149–390)
PMV BLD AUTO: 8.9 FL (ref 8.9–12.7)
POTASSIUM SERPL-SCNC: 4 MMOL/L (ref 3.5–5.3)
PROT SERPL-MCNC: 8.5 G/DL (ref 6.4–8.4)
RBC # BLD AUTO: 4.14 MILLION/UL (ref 3.88–5.62)
SODIUM SERPL-SCNC: 137 MMOL/L (ref 135–147)
WBC # BLD AUTO: 8.64 THOUSAND/UL (ref 4.31–10.16)

## 2022-09-29 PROCEDURE — 85025 COMPLETE CBC W/AUTO DIFF WBC: CPT | Performed by: FAMILY MEDICINE

## 2022-09-29 PROCEDURE — 36415 COLL VENOUS BLD VENIPUNCTURE: CPT | Performed by: FAMILY MEDICINE

## 2022-09-29 PROCEDURE — 80053 COMPREHEN METABOLIC PANEL: CPT | Performed by: FAMILY MEDICINE

## 2022-09-29 NOTE — PROGRESS NOTES
PULMONARY REHAB ASSESSMENT    Today's date: 2022  Patient name: Kristen Quezada     : 1945       MRN: [id number]  PCP: Meka Muñiz MD  Referring Physician: Giancarlo Thrasher  Pulmonologist: Diane Werner MD    Dx: ILD J84 [id number]      Date of onset: 2022    Weight:    Wt Readings from Last 1 Encounters:   22 89 4 kg (197 lb)      Height:   Ht Readings from Last 1 Encounters:   22 6' 5" (1 956 m)     Medical History:   Past Medical History:   Diagnosis Date    Bifascicular block 3/8/2020    Asx: discovered on Ekg done 3/20 in eval of chest pain   BPH with obstruction/lower urinary tract symptoms     BPH with urinary obstruction     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     Erectile dysfunction          Physical Limitations: occ lower back pain, not limiting    Oxygen needs: 2L- experimenting with flow and SpO2    History of Toxic Exposure:  None    Risk Factors   Cholesterol: No  Smoking: Former user  Quit 50 years; smoked for 3-4 years socially   HTN: No  DM: No  Obesity: No- losing weight   Inactivity: Yes  Stress:  perceived  stress: 8/10   Stressors:health, O2 use, wife's death   Goals for Stress Management:exercise, stress management, was given info on [place]    Family History:  Family History   Family history unknown:  Yes       Allergies: No known allergies and Other  ETOH:   Social History     Substance and Sexual Activity   Alcohol Use Yes    Alcohol/week: 12 0 standard drinks    Types: 12 Cans of beer per week    Comment: socially         Current Medications:   Current Outpatient Medications   Medication Sig Dispense Refill    fexofenadine (ALLEGRA) 180 MG tablet Take 1 tablet (180 mg total) by mouth daily 30 tablet 3    finasteride (PROSCAR) 5 mg tablet TAKE 1 TABLET BY MOUTH EVERY DAY 90 tablet 3    fluticasone (FLONASE) 50 mcg/act nasal spray 2 sprays into each nostril daily 16 g 5    guaifenesin-codeine (GUAIFENESIN AC) 100-10 MG/5ML liquid 1-2 TSP Q6HR PRN COUGH 120 mL 5    Multiple Vitamin (MULTI-DAY VITAMINS) TABS Take 1 tablet by mouth daily      nintedanib esylate (OFEV) 150 MG CAPS capsule Take 1 capsule (150 mg total) by mouth 2 (two) times a day 60 capsule 11    tamsulosin (FLOMAX) 0 4 mg TAKE 2 CAPSULES (0 8 MG TOTAL) BY MOUTH DAILY WITH DINNER 180 capsule 3     No current facility-administered medications for this visit  Functional Status Prior to Diagnosis for Treatment   Occupation: retired  ADLs: No limitations  [place]: No limitations  Exercise: walking 2-3 miles a day    Current Functional Status  Occupation: retired  ADLs:Capable of performing light to moderate ADLs limited by Dyspnea  Lightheadedness  Weakness  [place]: Capable of performing light to moderate ADLs limited by Dyspnea  Lightheadedness  Weakness able to perform self-care lives alone  Exercise: began walking on treadmill last 2 weeks for 10 mins  Patient Specific Goals:  Weight gain, increase strength and stamina, improve QOL and mental health, and getting off O2    Short Term Program Goals: dietary modifications increased strength improved energy/stamina with ADLs exercise 120-150 mins/wk weight gain    Long Term Goals: increased maximal walking duration  increased intial training workload  Improved functional capacity  Improved Quality of Life - [facility] score reduced  Reduced stress  improved Rate Your Plate Score    Oxygen Goals: Maintain SpO2>90% titrating supplemental oxygen as needed     Ability to reach goals/rehabilitation potential:  Very Good     Projected return to function: 12 weeks  Objective tests: 6 MWT      Nutritional   Reviewed details of Rate your Plate  Discussed key elements of heart healthy eating  Reviewed patient goals for dietary modifications and their clinical implications  Reviewed most recent lipid profile       Goals for dietary modification: choose lean cuts of meat  low fat ground meat and poultry  eliminate processed meats  increase fish intake  low fat dairy   reduced fat cheese  increase whole grains  increase fruits and vegetables  eliminate butter  low sodium  improved snack choices  reduce sweets/frozen desserts      Emotional/Social  Patient admits to feelings of anxiety- mostly with new diagnosis and new O2 use  Patient reports feelings of depression- has gone through a lot over last few months- judy wife's death  Patient was given info [facility] and [facility] [address]  Reports having neighbors and daughter for support    SOCIAL SUPPORT NETWORK    Marital status:       Domestic Violence Screening: No    Comments: no previous cardiac history  Nov 2021 started with a cough  Began seeing Dr Jean Paul Ashley, May 2022 had COVID, began losing weight, wife dx with multiple myeloma 3 weeks after, Aug wife passes away, has been on O2 for 1 week

## 2022-09-29 NOTE — PROGRESS NOTES
Pulmonary Rehabilitation Plan of Care   Initial Care Plan      Today's date: 2022   # of Exercise Sessions Completed: 1-Evaluation  Patient name: Dorita Jenkins      : 1945  Age: 69 y o  MRN: [id number]  Referring Physician: John Orr*  Pulmonologist: Derick Vogel MD  Provider: [facility]  Clinician: Prince Lee MS, CEP    Dx:   Encounter Diagnosis   Name Primary?  IL[facility] )      Date of onset: 2022      SUMMARY OF PROGRESS:  Today is Shan's initial evaluation to begin Pulmonary Rehab for the diagnosis of ILD  Ramila Kelley following with Dr Kathi Lino 2021 after mucous producing cough  In May 2022, he has COVID  Since then, he lost a significant amount of weight and began to experience increased SOB and fatigue  3 weeks later his wife was diagnosed with multiple myeloma, which she passed away from in Aug 2022  Biggs Part has had a tough time getting back to where he was prior to [facility]  No other significant health concerns reported  The patient currently does not follow a formal exercise program at home, but used to walk 2-3 miles per day prior to [facility]  Pt reports the following physical limitations: occ lower back pain  Depression screening using the PHQ-9 interprets the patient's score of 11 10-14 = Moderate Depression  Anxiety screening using the DON-7 interprets the patients score of 1  0-4  = Not anxious  When addressed, the patient admits to having depression/anxiety  He states he has increased anxiety with recent new O2 use  Landon Part reports significant depression due to his wife passing away  Patient reports excellent social/emotional support from his daughter and neighbors  Information to begin using [address] was provided as well as contact information for counseling through [employer]  PHQ-9 score will be reassessed in 30 days  The patient is a former smoker (quit 50 years ago)  Patient admits to 100% medication compliance    At rest, the patient rated dyspnea 2/10 with SpO2 96% on supplemental O2 2L/min via nasal canula  They completed an initial 6MWT, walking 1075 ft on supplemental O2 2-3L/min via nasal canula  The patients rating of perceived dyspnea during the 6MWT was 5/10 with SpO2 87%  Patient took no rest breaks  Telemetry revealed NSR, rare PVCs- negative deflection of QRS with slight widening (possible RBBB) 1 min run if trigeminy PVCs noted  Resting  /74 with appropriate hemodynamic response to exercise reaching 140/70  Patient will exercise on supplemental O2 2-3L/min via nasal canula and will titrate O2 to maintain SpO2 >90%  Education on smoking cessation, oxygen use, breathing techniques, pulmonary anatomy, exercise for the pulmonary patient, healthy eating, stress, and relaxation will be provided  Patient goals include: weight gain, begin walking daily again and improving QOL and overall strength  Otelia Apgar will attend 24 exercise sessions, 2x/wk for 12-18 weeks beginning 10/4/2022  Will progress patient as tolerated over the next 30 days        Medication compliance: Yes   Comments: Pt reports to be compliant with medications  Fall Risk: Low   Comments: Ambulates with a steady gait with no assist device and Denies a fall in the past 6 months    Smoking: Former user  Quit 50 years ago    RPD at Rest: 2/10  RPD with Exercise:  5/10    Assessment of progression of lung disease and functional status:  CAT: 23/40  Shortness of breath questionnaire: 68/120      EXERCISE ASSESSMENT and PLAN    Current Exercise Program in Rehab:       Frequency:  2 days/week   Supplement with home exercise 2+ days/wk as tolerated        Minutes: 30         METS: 2 0-2 8              SpO2: >90%              RPD: 4-6                      HR: RHR+30bpm   RPE: 4-5         Modalities: UBE, NuStep, Recumbent bike and Room walking      Exercise Progression 30 Day Goals :    Frequency: 2 days/week    Supplement with home exercise 2+ days/wk as tolerated       Minutes: 30-35         METS: 2 5-3 5              SpO2: >90%              RPD: 4-6                      HR: RHR+30bpm   RPE: 4-5        Modalities: Treadmill, UBE, NuStep, Recumbent bike and Room walking     Strength trainin-3 days / week  12-15 repetitions  1-2 sets per modality   Will be added following 2-3 weeks of monitored exercise sessions   Modalities: Leg Press, Chest Press, Lateral Raise, Arm Extension, Arm Curl, Front Raises and Shoulder Shrugs    Oxygen Needs: supplemental O2 via nasal cannula @ 2L/min at rest and supplemental O2 via nasal cannula @2-4L/min with exercise  Oxygen Goal: Maintain SpO2>90% during exercise    Home Exercise: none, was walking 2-3 miles daily prior to COVID infection in May  Education: pursed lipped breathing, diaphragmatic breathing, relaxation breathing, home exercise, benefits of exercise for pulmonary disease, RPE scale, RPD scale, O2 saturation monitoring and appropriate O2 response to exercise    Goals: reduced score on  [facility]CD Shortness of Breath Questionnaire, Improved 6MW distance by 10%, reduced dyspnea during exercise (0-3/10), improved exercise tolerance (max METs tolerated in pulmonary rehab), SpO2 >90% during exercise, attend pulmonary rehab regularly, decrease sitting time at home and start a walking program  Progressing:  Reviewed Pt goals and determined plan of care    Plan: Titrate supplemental oxygen as needed to maintain SpO2>90% with exercise, learn to conserve energy with ADLs , practice breathing techniques 3x/day and reduce time sitting at home    Readiness to change: Preparation:  (Getting ready to change)       NUTRITION ASSESSMENT AND PLAN    Weight control:    Starting weight: 172 6 lb   Current weight:       Diabetes: N/A    Goals:choose lean meat (93-95%), increase intake of fish, shellfish, use low fat dairy, eat 3 or more servings of whole grains a day, Eat 4-5 cups of fruits and vegetables daily, choose low sodium processed foods, eliminate butter, eliminate or choose low-fat sweets, daily saturated fat intake <7%/13g and weight gain  Education: hydration  Progressing:Reviewed Pt goals and determined plan of care  Plan: Education class: Reading Food Labels, Education Class: Heart Healthy Eating, replace refined grain bread with whole grain bread, replace unhealthy snacks with fruits & vegs, avoid processed foods, drink more water, learn how to read food labels, replace sugar with stevia or truvia, slow down eating time, decrease carbonated beverages and decrease gas producing foods  Readiness to change: Preparation:  (Getting ready to change)       PSYCHOSOCIAL ASSESSMENT AND PLAN    Emotional:  Depression assessment:  PHQ-9 = 11 10-14 = Moderate Depression            Anxiety measure:  DON-7 = 1 0-4  = Not anxious  Self-reported stress level: 8   Social support: Patient reports excellent emotional/social support from daughter and neighbors- he has been relying on them a lot after death of his wife 1 month ago    Goals:  Reduce perceived stress to 1-3/10, improved [facility] QOL < 27, PHQ-9 - reduced severity by one level, Feelings in [facility] Score < 3, Physical Fitness in [facility] Score < 3, Daily Activity in [facility] Score < 3, Social Activities in [facility] Score < 3, Pain in [facility] Score < 3, Overall Health in [facility] Score < 3, Quality of Life in [facility] Score < 3 , Change in Health in [place] Score < 3 , Increased interest in doing things, improved sleep, Improved appetite, improved positive thoughts of well being and increased energy  Education: signs/sxs of depression, benefits of a positive support system, stress management techniques, depression and CAD and benefits of mental health counseling    Progressing:Reviewed Pt goals and determined plan of care  Plan: Class: Stress and Your Health, Class: Relaxation, Refer to [employer], Practice relaxation techniques, Exercise, Enjoy a hobby, Keep a positive mindset, Enjoy family, Return to previous social activity and Repeat PHQ-9 every 30 days if score >5  Readiness to change: Preparation:  (Getting ready to change)       OTHER CORE COMPONENTS     Tobacco:   Social History     Tobacco Use   Smoking Status Former Smoker    Packs/day: 0 50    Years: 5 00    Pack years: 2 50    Types: Cigarettes    Start date:     Quit date: 27    Years since quittin 7   Smokeless Tobacco Never Used       Tobacco Use Intervention: Referral to tobacco expert:   Pt quit 50 years ago   and has abstained    Blood pressure:    Restin/74   Exercise: 140/70    Goals: consistent BP < 130/80, reduced dietary sodium <2300mg, moderate intensity exercise >150 mins/wk and medication compliance  Education:  pathophysiology of pulmonary disease, control coughing, inspiratory muscle training and environmental triggers  Progressing:Reviewed Pt goals and determined plan of care  Plan: Avoid Processed foods, engage in regular exercise, eliminate salt shaker at the table, use salt substitutes and check labels for sodium content  Readiness to change: Preparation:  (Getting ready to change)

## 2022-10-04 ENCOUNTER — CLINICAL SUPPORT (OUTPATIENT)
Dept: PULMONOLOGY | Facility: CLINIC | Age: 77
End: 2022-10-04
Payer: MEDICARE

## 2022-10-04 DIAGNOSIS — J84.9 ILD (INTERSTITIAL LUNG DISEASE) (HCC): Primary | ICD-10-CM

## 2022-10-04 PROCEDURE — G0239 OTH RESP PROC, GROUP: HCPCS

## 2022-10-04 NOTE — PROGRESS NOTES
Outpatient Cardiology Consult Note - Flo Westbrook 69 y o  male MRN: [id number]    @ Encounter: [id number]        Patient Active Problem List    Diagnosis Date Noted    COVID-19 07/07/2022    Dermatitis 02/18/2022    Dry nose 12/27/2021    Shoulder pain 06/03/2020    Left anterior hemiblock 03/08/2020    Medicare annual wellness visit, initial 10/02/2019    Abnormal CT of the chest 07/15/2019    Cough 07/03/2019    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    Other hemorrhoids 03/05/2019    BPH with obstruction/lower urinary tract symptoms 06/13/2018    Combined arterial insufficiency and corporo-venous occlusive erectile dysfunction 06/13/2018       Assessment:  # Pulmonary HTN  PAH Rx:  Oxygen: 2 liters  Diuretic:  Weight: 169 lbs  NT proBNP:    Functional assessment:  6 min walk 8/18/22: 135 meters, pulse ox dropped to 87%  6 min walk 7/13/22: 308 meters, pulse ox dropped to 90%  6 min walk 2/24/22: 476 meters    Spirometry/ PFTs:  Spirometry 9/22/22: moderately to severely restrictive  PFTs 3/16/22: mild restrictive, DLCO 29%    Studies- personally reviewed by me  Echo 9/17/22  LVEF: 50%  RV: mildly dilated- RVID 4 cm  PASP: 72 mmHg  RVOT: no notching  Aorta 4 2 cm  Dilated IVC    HRCT 3/11/22: biapical pleural parenchymal thickening  Main PA 3 8 cm    # ILD- pulmonary fibrosis  Dr Jevon Leger  # 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  # COVID, May    Today's Plan:  RHC, may need exercise as likely has HFpEF component  If wedge is high will work up diastology    HPI:      70 yo former smoker diagnosed with ILD referred for evaluation of pulmonary hypertension  He also has untreated ROSALINA and has been experiencing worsening MCGILL  His echo has shown indirect PA pressures at 72 mmHg with dilated IVC, CT chest showing pulmonary fibrosis with enlargement of main PA of 3 8 cm  Had [place] in May, hit him hard  Lost his wife this year to multiple myeloma  He had a tough year        No hx of spinal stenosis and     Past Medical History:   Diagnosis Date    Bifascicular block 3/8/2020    Asx: discovered on Ekg done 3/20 in eval of chest pain   BPH with obstruction/lower urinary tract symptoms     BPH with urinary obstruction     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     Erectile dysfunction        Review of Systems   Constitutional: Negative for activity change, appetite change, fatigue and unexpected weight change  HENT: Negative for congestion and nosebleeds  Eyes: Negative  Respiratory: Positive for shortness of breath  Negative for cough and chest tightness  Cardiovascular: Negative for chest pain, palpitations and leg swelling  Gastrointestinal: Negative for abdominal distention  Endocrine: Negative  Genitourinary: Negative  Musculoskeletal: Negative  Skin: Negative  Neurological: Negative for dizziness, syncope and weakness  Hematological: Negative  Psychiatric/Behavioral: Negative  Allergies   Allergen Reactions    No Known Allergies     Other            Current Outpatient Medications:     fexofenadine (ALLEGRA) 180 MG tablet, Take 1 tablet (180 mg total) by mouth daily, Disp: 30 tablet, Rfl: 3    finasteride (PROSCAR) 5 mg tablet, TAKE 1 TABLET BY MOUTH EVERY DAY, Disp: 90 tablet, Rfl: 3    fluticasone (FLONASE) 50 mcg/act nasal spray, 2 sprays into each nostril daily, Disp: 16 g, Rfl: 5    guaifenesin-codeine (GUAIFENESIN AC) 100-10 MG/5ML liquid, 1-2 TSP Q6HR PRN COUGH, Disp: 120 mL, Rfl: 5    Multiple Vitamin (MULTI-DAY VITAMINS) TABS, Take 1 tablet by mouth daily, Disp: , Rfl:     nintedanib esylate (OFEV) 150 MG CAPS capsule, Take 1 capsule (150 mg total) by mouth 2 (two) times a day, Disp: 60 capsule, Rfl: 11    tamsulosin (FLOMAX) 0 4 mg, TAKE 2 CAPSULES (0 8 MG TOTAL) BY MOUTH DAILY WITH DINNER, Disp: 180 capsule, Rfl: 3    Social History     Socioeconomic History    Marital status: /Civil Union     Spouse name: Not on file    Number of children: Not on file    Years of education: Not on file    Highest education level: Not on file   Occupational History    Not on file   Tobacco Use    Smoking status: Former Smoker     Packs/day: 0 50     Years: 5 00     Pack years: 2 50     Types: Cigarettes     Start date:      Quit date:      Years since quittin 7    Smokeless tobacco: Never Used   Vaping Use    Vaping Use: Never used   Substance and Sexual Activity    Alcohol use: Yes     Alcohol/week: 12 0 standard drinks     Types: 12 Cans of beer per week     Comment: socially    Drug use: No    Sexual activity: Not Currently   Other Topics Concern    Not on file   Social History Narrative    Not on file     Social Determinants of Health     Financial Resource Strain: Not on file   Food Insecurity: Not on file   Transportation Needs: Not on file   Physical Activity: Not on file   Stress: Not on file   Social Connections: Not on file   Intimate Partner Violence: Not on file   Housing Stability: Not on file       Family History   Family history unknown: Yes       Physical Exam:    Vitals: There were no vitals taken for this visit  , There is no height or weight on file to calculate BMI ,   Wt Readings from Last 3 Encounters:   22 89 4 kg (197 lb)   22 76 7 kg (169 lb)   22 76 9 kg (169 lb 9 6 oz)       Physical Exam:  There were no vitals filed for this visit  Physical Exam  Constitutional:       Appearance: He is well-developed  HENT:      Head: Normocephalic and atraumatic  Eyes:      Pupils: Pupils are equal, round, and reactive to light  Neck:      Vascular: No JVD  Cardiovascular:      Rate and Rhythm: Normal rate and regular rhythm  Heart sounds: No murmur heard  Pulmonary:      Effort: Pulmonary effort is normal  No respiratory distress  Breath sounds: Normal breath sounds  Abdominal:      General: There is no distension  Palpations: Abdomen is soft  Tenderness:  There is no abdominal tenderness  Musculoskeletal:         General: Normal range of motion  Cervical back: Normal range of motion  Skin:     General: Skin is warm and dry  Findings: No rash  Neurological:      Mental Status: He is alert and oriented to person, place, and time  Labs & Results:    Lab Results   Component Value Date    WBC 8 64 09/29/2022    HGB 13 5 09/29/2022    HCT 42 2 09/29/2022     (H) 09/29/2022     09/29/2022     Lab Results   Component Value Date    SODIUM 137 09/29/2022    K 4 0 09/29/2022     09/29/2022    CO2 28 09/29/2022    BUN 21 09/29/2022    CREATININE 0 94 09/29/2022    GLUC 103 06/30/2022    CALCIUM 8 8 09/29/2022     No results found for: BNP   Lab Results   Component Value Date    CHOLESTEROL 136 12/03/2021     Lab Results   Component Value Date    HDL 68 12/03/2021     Lab Results   Component Value Date    TRIG 43 12/03/2021     Lab Results   Component Value Date    [place] 68 12/03/2021             EKG personally reviewed by Dany Pat  Counseling / Coordination of Care  Time spent today 40 minutes  Greater than 50% of total time was spent with the patient and / or family counseling and / or coordination of care  We discussed diagnoses, most recent studies and any changes in treatment  Thank you for the opportunity to participate in the care of this patient      [address] [facility] PULMONARY [facility]YPERTENSION  MEDICAL DIRECTOR OF [facility]

## 2022-10-04 NOTE — H&P (VIEW-ONLY)
Outpatient Cardiology Consult Note - Rubi Maxwell 69 y o  male MRN: [id number]    @ Encounter: [id number]        Patient Active Problem List    Diagnosis Date Noted   • COVID-19 07/07/2022   • Dermatitis 02/18/2022   • Dry nose 12/27/2021   • Shoulder pain 06/03/2020   • Left anterior hemiblock 03/08/2020   • Medicare annual wellness visit, initial 10/02/2019   • Abnormal CT of the chest 07/15/2019   • Cough 07/03/2019   • 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   • Other hemorrhoids 03/05/2019   • BPH with obstruction/lower urinary tract symptoms 06/13/2018   • Combined arterial insufficiency and corporo-venous occlusive erectile dysfunction 06/13/2018       Assessment:  # Pulmonary HTN  PAH Rx:  Oxygen: 2 liters  Diuretic:  Weight: 169 lbs  NT proBNP:    Functional assessment:  6 min walk 8/18/22: 135 meters, pulse ox dropped to 87%  6 min walk 7/13/22: 308 meters, pulse ox dropped to 90%  6 min walk 2/24/22: 476 meters    Spirometry/ PFTs:  Spirometry 9/22/22: moderately to severely restrictive  PFTs 3/16/22: mild restrictive, DLCO 29%    Studies- personally reviewed by me  Echo 9/17/22  LVEF: 50%  RV: mildly dilated- RVID 4 cm  PASP: 72 mmHg  RVOT: no notching  Aorta 4 2 cm  Dilated IVC    HRCT 3/11/22: biapical pleural parenchymal thickening  Main PA 3 8 cm    # ILD- pulmonary fibrosis  Dr Charles Gorman  # 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  # COVID, May    Today's Plan:  RHC, may need exercise as likely has HFpEF component  If wedge is high will work up diastology    HPI:      68 yo former smoker diagnosed with ILD referred for evaluation of pulmonary hypertension  He also has untreated ROSALINA and has been experiencing worsening MCGILL  His echo has shown indirect PA pressures at 72 mmHg with dilated IVC, CT chest showing pulmonary fibrosis with enlargement of main PA of 3 8 cm  Had [place] in May, hit him hard  Lost his wife this year to multiple myeloma  He had a tough year        No hx of spinal stenosis and     Past Medical History:   Diagnosis Date   • Bifascicular block 3/8/2020    Asx: discovered on Ekg done 3/20 in eval of chest pain  • BPH with obstruction/lower urinary tract symptoms    • BPH with urinary obstruction    • 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    • Erectile dysfunction        Review of Systems   Constitutional: Negative for activity change, appetite change, fatigue and unexpected weight change  HENT: Negative for congestion and nosebleeds  Eyes: Negative  Respiratory: Positive for shortness of breath  Negative for cough and chest tightness  Cardiovascular: Negative for chest pain, palpitations and leg swelling  Gastrointestinal: Negative for abdominal distention  Endocrine: Negative  Genitourinary: Negative  Musculoskeletal: Negative  Skin: Negative  Neurological: Negative for dizziness, syncope and weakness  Hematological: Negative  Psychiatric/Behavioral: Negative  Allergies   Allergen Reactions   • No Known Allergies    • Other            Current Outpatient Medications:   •  fexofenadine (ALLEGRA) 180 MG tablet, Take 1 tablet (180 mg total) by mouth daily, Disp: 30 tablet, Rfl: 3  •  finasteride (PROSCAR) 5 mg tablet, TAKE 1 TABLET BY MOUTH EVERY DAY, Disp: 90 tablet, Rfl: 3  •  fluticasone (FLONASE) 50 mcg/act nasal spray, 2 sprays into each nostril daily, Disp: 16 g, Rfl: 5  •  guaifenesin-codeine (GUAIFENESIN AC) 100-10 MG/5ML liquid, 1-2 TSP Q6HR PRN COUGH, Disp: 120 mL, Rfl: 5  •  Multiple Vitamin (MULTI-DAY VITAMINS) TABS, Take 1 tablet by mouth daily, Disp: , Rfl:   •  nintedanib esylate (OFEV) 150 MG CAPS capsule, Take 1 capsule (150 mg total) by mouth 2 (two) times a day, Disp: 60 capsule, Rfl: 11  •  tamsulosin (FLOMAX) 0 4 mg, TAKE 2 CAPSULES (0 8 MG TOTAL) BY MOUTH DAILY WITH DINNER, Disp: 180 capsule, Rfl: 3    Social History     Socioeconomic History   • Marital status: /Civil Union     Spouse name: Not on file   • Number of children: Not on file   • Years of education: Not on file   • Highest education level: Not on file   Occupational History   • Not on file   Tobacco Use   • Smoking status: Former Smoker     Packs/day: 0 50     Years: 5 00     Pack years: 2 50     Types: Cigarettes     Start date: 1     Quit date:      Years since quittin 7   • Smokeless tobacco: Never Used   Vaping Use   • Vaping Use: Never used   Substance and Sexual Activity   • Alcohol use: Yes     Alcohol/week: 12 0 standard drinks     Types: 12 Cans of beer per week     Comment: socially   • Drug use: No   • Sexual activity: Not Currently   Other Topics Concern   • Not on file   Social History Narrative   • Not on file     Social Determinants of Health     Financial Resource Strain: Not on file   Food Insecurity: Not on file   Transportation Needs: Not on file   Physical Activity: Not on file   Stress: Not on file   Social Connections: Not on file   Intimate Partner Violence: Not on file   Housing Stability: Not on file       Family History   Family history unknown: Yes       Physical Exam:    Vitals: There were no vitals taken for this visit  , There is no height or weight on file to calculate BMI ,   Wt Readings from Last 3 Encounters:   22 89 4 kg (197 lb)   22 76 7 kg (169 lb)   22 76 9 kg (169 lb 9 6 oz)       Physical Exam:  There were no vitals filed for this visit  Physical Exam  Constitutional:       Appearance: He is well-developed  HENT:      Head: Normocephalic and atraumatic  Eyes:      Pupils: Pupils are equal, round, and reactive to light  Neck:      Vascular: No JVD  Cardiovascular:      Rate and Rhythm: Normal rate and regular rhythm  Heart sounds: No murmur heard  Pulmonary:      Effort: Pulmonary effort is normal  No respiratory distress  Breath sounds: Normal breath sounds  Abdominal:      General: There is no distension  Palpations: Abdomen is soft  Tenderness:  There is no abdominal tenderness  Musculoskeletal:         General: Normal range of motion  Cervical back: Normal range of motion  Skin:     General: Skin is warm and dry  Findings: No rash  Neurological:      Mental Status: He is alert and oriented to person, place, and time  Labs & Results:    Lab Results   Component Value Date    WBC 8 64 09/29/2022    HGB 13 5 09/29/2022    HCT 42 2 09/29/2022     (H) 09/29/2022     09/29/2022     Lab Results   Component Value Date    SODIUM 137 09/29/2022    K 4 0 09/29/2022     09/29/2022    CO2 28 09/29/2022    BUN 21 09/29/2022    CREATININE 0 94 09/29/2022    GLUC 103 06/30/2022    CALCIUM 8 8 09/29/2022     No results found for: BNP   Lab Results   Component Value Date    CHOLESTEROL 136 12/03/2021     Lab Results   Component Value Date    HDL 68 12/03/2021     Lab Results   Component Value Date    TRIG 43 12/03/2021     Lab Results   Component Value Date    [place] 68 12/03/2021             EKG personally reviewed by Kentrell Bautista  Counseling / Coordination of Care  Time spent today 40 minutes  Greater than 50% of total time was spent with the patient and / or family counseling and / or coordination of care  We discussed diagnoses, most recent studies and any changes in treatment  Thank you for the opportunity to participate in the care of this patient      [address] [facility] PULMONARY HYPERTENSION  MEDICAL DIRECTOR OF [place]

## 2022-10-05 ENCOUNTER — OFFICE VISIT (OUTPATIENT)
Dept: CARDIOLOGY CLINIC | Facility: CLINIC | Age: 77
End: 2022-10-05
Payer: MEDICARE

## 2022-10-05 ENCOUNTER — TELEPHONE (OUTPATIENT)
Dept: SLEEP CENTER | Facility: CLINIC | Age: 77
End: 2022-10-05

## 2022-10-05 ENCOUNTER — HOSPITAL ENCOUNTER (OUTPATIENT)
Dept: SLEEP CENTER | Facility: CLINIC | Age: 77
Discharge: HOME | End: 2022-10-05
Payer: MEDICARE

## 2022-10-05 VITALS
HEIGHT: 77 IN | BODY MASS INDEX: 20 KG/M2 | DIASTOLIC BLOOD PRESSURE: 80 MMHG | HEART RATE: 84 BPM | WEIGHT: 169.4 LBS | SYSTOLIC BLOOD PRESSURE: 132 MMHG

## 2022-10-05 DIAGNOSIS — I27.20 PULMONARY HYPERTENSION (HCC): Primary | ICD-10-CM

## 2022-10-05 DIAGNOSIS — J84.10 PULMONARY FIBROSIS (HCC): ICD-10-CM

## 2022-10-05 DIAGNOSIS — I25.84 CORONARY ARTERY CALCIFICATION: ICD-10-CM

## 2022-10-05 DIAGNOSIS — G47.33 OSA (OBSTRUCTIVE SLEEP APNEA): ICD-10-CM

## 2022-10-05 DIAGNOSIS — I25.10 CORONARY ARTERY CALCIFICATION: ICD-10-CM

## 2022-10-05 PROCEDURE — 95810 POLYSOM 6/> YRS 4/> PARAM: CPT

## 2022-10-05 PROCEDURE — 99204 OFFICE O/P NEW MOD 45 MIN: CPT | Performed by: INTERNAL MEDICINE

## 2022-10-05 PROCEDURE — 95810 POLYSOM 6/> YRS 4/> PARAM: CPT | Performed by: INTERNAL MEDICINE

## 2022-10-05 NOTE — TELEPHONE ENCOUNTER
----- Message from Berhane Tolbert MD sent at 10/5/2022  1:03 PM EDT -----  Approved   ----- Message -----  From: Grant Waters  Sent: 10/5/2022   8:25 AM EDT  To: Sleep Medicine [facility] Provider    This spilt sleep study needs approval      If approved please sign and return to clerical pool  If denied please include reasons why  Also provide alternative testing if warranted  Please sign and return to clerical pool

## 2022-10-05 NOTE — TELEPHONE ENCOUNTER
----- Message from Rebecca Saenz MD sent at 10/5/2022  1:03 PM EDT -----  Approved   ----- Message -----  From: Laureano Monaco  Sent: 10/5/2022   8:25 AM EDT  To: Sleep Medicine [place] Provider    This spilt sleep study needs approval      If approved please sign and return to clerical pool  If denied please include reasons why  Also provide alternative testing if warranted  Please sign and return to clerical pool

## 2022-10-06 ENCOUNTER — APPOINTMENT (OUTPATIENT)
Dept: PULMONOLOGY | Facility: CLINIC | Age: 77
End: 2022-10-06

## 2022-10-06 NOTE — PROGRESS NOTES
Sleep Study Documentation    Pre-Sleep Study       Sleep testing procedure explained to patient:YES    Patient napped prior to study:YES- less than 30 minutes  Napped after 2PM: no    Caffeine:Dayshift worker after 12PM   Caffeine use:NO    Alcohol:Dayshift workers after 5PM: Alcohol use:NO    Typical day for patient:NO       Study Documentation    Sleep Study Indications: EDS, nocturnal choking    Sleep Study: Diagnostic   Snore: Moderate  Supplemental O2: no    Minimum SaO2 86  Baseline SaO2 94      EKG abnormalities: yes:  EPOCH example and comments: PVCs ( 374, 826)    EEG abnormalities: no    Sleep Study Recorded < 2 hours: N/A    Sleep Study Recorded > 2 hours but incomplete study: N/A    Sleep Study Recorded 6 hours but no sleep obtained: NO    Patient classification: retired       Post-Sleep Study    Medication used at bedtime or during sleep study:NO    Patient reports time it took to fall asleep:greater than 60 minutes    Patient reports waking up during study:3 or more times  Patient reports returning to sleep in greater than 30 minutes  Patient reports sleeping less than 2 hours without dreaming  Patient reports sleep during study:worse than usual    Patient rated sleepiness: Very sleepy or tired    PAP treatment:no

## 2022-10-07 ENCOUNTER — TRANSCRIBE ORDERS (OUTPATIENT)
Dept: FAMILY MEDICINE CLINIC | Facility: CLINIC | Age: 77
End: 2022-10-07

## 2022-10-07 ENCOUNTER — CLINICAL SUPPORT (OUTPATIENT)
Dept: PULMONOLOGY | Facility: CLINIC | Age: 77
End: 2022-10-07
Payer: MEDICARE

## 2022-10-07 DIAGNOSIS — R05.3 CHRONIC COUGH: Primary | ICD-10-CM

## 2022-10-07 DIAGNOSIS — J84.9 ILD (INTERSTITIAL LUNG DISEASE) (HCC): Primary | ICD-10-CM

## 2022-10-07 PROBLEM — R93.89 ABNORMAL CT OF THE CHEST: Chronic | Status: RESOLVED | Noted: 2019-07-15 | Resolved: 2022-10-07

## 2022-10-07 PROCEDURE — G0239 OTH RESP PROC, GROUP: HCPCS

## 2022-10-07 RX ORDER — HYDROCODONE POLISTIREX AND CHLORPHENIRAMINE POLISTIREX 10; 8 MG/5ML; MG/5ML
5 SUSPENSION, EXTENDED RELEASE ORAL EVERY 12 HOURS PRN
Qty: 120 ML | Refills: 0 | Status: SHIPPED | OUTPATIENT
Start: 2022-10-07

## 2022-10-07 NOTE — PROGRESS NOTES
Patient would like a call back from Dr Angelita Kennedy  He has a lot of questions about the OFEV he would like to know what benefits he is getting from the medication and additional questions

## 2022-10-07 NOTE — PROGRESS NOTES
PtLM re: he called 3 days ago to speak w/ Dr Cindy Mayen in regards to the copay for his Don Santana is going to be $3,000/month  Please advise: [phone]

## 2022-10-10 ENCOUNTER — TELEPHONE (OUTPATIENT)
Dept: SLEEP CENTER | Facility: CLINIC | Age: 77
End: 2022-10-10

## 2022-10-10 NOTE — TELEPHONE ENCOUNTER
----- Message from Yani Zelaya MD sent at 10/10/2022  5:20 AM EDT -----  approved  ----- Message -----  From: Mindi Rendon  Sent: 9/23/2022   9:28 AM EDT  To: Sleep Medicine [facility] Provider    This split sleep study needs approval      If approved please sign and return to clerical pool  If denied please include reasons why  Also provide alternative testing if warranted  Please sign and return to clerical pool

## 2022-10-10 NOTE — PROGRESS NOTES
Patient called, he said he has been asking Dr Aguilar to call him all week and is not happy he hasn't heard back  He is not doing any better, is getting worse and would appreciate his concerns to be addressed   Please advise

## 2022-10-11 ENCOUNTER — OFFICE VISIT (OUTPATIENT)
Dept: PULMONOLOGY | Facility: CLINIC | Age: 77
End: 2022-10-11
Payer: MEDICARE

## 2022-10-11 ENCOUNTER — CLINICAL SUPPORT (OUTPATIENT)
Dept: PULMONOLOGY | Facility: CLINIC | Age: 77
End: 2022-10-11
Payer: MEDICARE

## 2022-10-11 ENCOUNTER — TELEPHONE (OUTPATIENT)
Dept: CARDIOLOGY CLINIC | Facility: CLINIC | Age: 77
End: 2022-10-11

## 2022-10-11 VITALS
BODY MASS INDEX: 19.6 KG/M2 | SYSTOLIC BLOOD PRESSURE: 120 MMHG | WEIGHT: 166 LBS | DIASTOLIC BLOOD PRESSURE: 60 MMHG | OXYGEN SATURATION: 91 % | HEART RATE: 80 BPM | RESPIRATION RATE: 18 BRPM | HEIGHT: 77 IN

## 2022-10-11 DIAGNOSIS — J30.89 NON-SEASONAL ALLERGIC RHINITIS, UNSPECIFIED TRIGGER: ICD-10-CM

## 2022-10-11 DIAGNOSIS — J84.9 ILD (INTERSTITIAL LUNG DISEASE) (HCC): Primary | ICD-10-CM

## 2022-10-11 DIAGNOSIS — G47.33 OSA (OBSTRUCTIVE SLEEP APNEA): ICD-10-CM

## 2022-10-11 DIAGNOSIS — R06.09 DOE (DYSPNEA ON EXERTION): ICD-10-CM

## 2022-10-11 DIAGNOSIS — J96.11 CHRONIC HYPOXEMIC RESPIRATORY FAILURE (HCC): ICD-10-CM

## 2022-10-11 PROCEDURE — G0239 OTH RESP PROC, GROUP: HCPCS

## 2022-10-11 PROCEDURE — 99214 OFFICE O/P EST MOD 30 MIN: CPT | Performed by: INTERNAL MEDICINE

## 2022-10-11 NOTE — TELEPHONE ENCOUNTER
Patient scheduled for RHC on 10/18/22 at [facility] with Dr Herminio Phoenix  Instructions sent to patient through [address]  Per patient, Medicare as primary insurance

## 2022-10-11 NOTE — PROGRESS NOTES
Office Progress Note - Pulmonary    Shanon Esparza 69 y o  male MRN: [id number]    Encounter: [id number]      Assessment:  • Interstitial lung disease  • Dyspnea on exertion  • Pulmonary hypertension  • Chronic hypoxemic respiratory failure per  • Allergic rhinitis  • Obstructive sleep apnea  Plan:   •  I will call the representative of nintedanib and see if they can provide assistance to help with the co-payment  • Oxygen supplement with activities  • HRCT of the chest scheduled in about a week  • Right heart catheterization is scheduled in about a week  • I will speak with the Sleep Lab regarding the results of the sleep study  • Continue pulmonary rehab  • I will see him in 3 weeks  Discussion:   The patient's dyspnea on exertion is multifactorial   In part it is due to his progressive while the  Also due to the pulmonary hypertension  He is scheduled for right heart catheterization in about a week  I am hoping that if he qualifies for specific pulmonary hypertension treatment his functional status will improve  For now I have continued him on the pulmonary rehab  He will use the oxygen with activities  I will re-evaluate him after the right heart catheterization is done and the HRCT of of the chest is done  I will speak with the representative of nintedanib and see if they can assist him with the co-payment  I will see him in about 3 weeks  Subjective: The patient is here for a follow-up visit  He is frustrated as he is feeling very limited  However he likes the pulmonary rehab  He still has cough  He was seen by Dr Willy Sung and is scheduled for right heart catheterization  He did the sleep study  Review of systems:  A 12 point system review is done and aside from what is stated above the rest of the review of systems is negative  Family history and social history are reviewed  Medications list is reviewed  Vitals: Blood pressure 120/60, pulse 80, resp   rate 18, height 6' 5" (1 956 m), weight 75 3 kg (166 lb), SpO2 91 %  ,     Physical Exam  Gen: Awake, alert, oriented x 3, no acute distress  HEENT: Mucous membranes moist, no oral lesions, no thrush  NECK: No accessory muscle use, JVP not elevated  Cardiac: Regular, single S1, single S2, no murmurs, no rubs, no gallops  Lungs:  Bilateral posterior crackles  Abdomen: normoactive bowel sounds, soft nontender, nondistended, no rebound or rigidity, no guarding  Extremities: no cyanosis, no clubbing, no edema  Neuro:  Grossly nonfocal   Skin:  No rash  The sleep study report is reviewed  The patient did not sleep well  He had central apnea      Lab Results   Component Value Date    WBC 8 64 09/29/2022    HGB 13 5 09/29/2022    HCT 42 2 09/29/2022     (H) 09/29/2022     09/29/2022     Lab Results   Component Value Date    SODIUM 137 09/29/2022    K 4 0 09/29/2022     09/29/2022    CO2 28 09/29/2022    BUN 21 09/29/2022    CREATININE 0 94 09/29/2022    GLUC 103 06/30/2022    CALCIUM 8 8 09/29/2022

## 2022-10-13 ENCOUNTER — HOSPITAL ENCOUNTER (OUTPATIENT)
Dept: CT IMAGING | Facility: HOSPITAL | Age: 77
Discharge: HOME | End: 2022-10-13
Attending: INTERNAL MEDICINE
Payer: MEDICARE

## 2022-10-13 ENCOUNTER — CLINICAL SUPPORT (OUTPATIENT)
Dept: PULMONOLOGY | Facility: CLINIC | Age: 77
End: 2022-10-13
Payer: MEDICARE

## 2022-10-13 DIAGNOSIS — G47.33 OSA (OBSTRUCTIVE SLEEP APNEA): Primary | ICD-10-CM

## 2022-10-13 DIAGNOSIS — J84.9 ILD (INTERSTITIAL LUNG DISEASE) (HCC): ICD-10-CM

## 2022-10-13 DIAGNOSIS — J84.9 ILD (INTERSTITIAL LUNG DISEASE) (HCC): Primary | ICD-10-CM

## 2022-10-13 PROCEDURE — G0239 OTH RESP PROC, GROUP: HCPCS

## 2022-10-13 PROCEDURE — G1004 CDSM NDSC: HCPCS

## 2022-10-13 PROCEDURE — 71250 CT THORAX DX C-: CPT

## 2022-10-14 NOTE — TELEPHONE ENCOUNTER
I arranged transportation for this patient throught [facility] for his procedure on Tuesday 10/18/22  Can elena please check for time and relate the information to Reggie ([id number]) and patient for  time  Thank you

## 2022-10-17 NOTE — PROGRESS NOTES
Patient calling stating he spoke with Amadix last Tuesday and he advised him he was going to speak with the OF rep and get back to him  He has not heard anything and would like an update   Please advise

## 2022-10-18 ENCOUNTER — TREATMENT (OUTPATIENT)
Dept: FAMILY MEDICINE CLINIC | Facility: CLINIC | Age: 77
End: 2022-10-18

## 2022-10-18 ENCOUNTER — TELEPHONE (OUTPATIENT)
Dept: CARDIOLOGY CLINIC | Facility: CLINIC | Age: 77
End: 2022-10-18

## 2022-10-18 ENCOUNTER — TELEPHONE (OUTPATIENT)
Dept: PULMONOLOGY | Facility: CLINIC | Age: 77
End: 2022-10-18

## 2022-10-18 ENCOUNTER — HOSPITAL ENCOUNTER (OUTPATIENT)
Facility: HOSPITAL | Age: 77
Setting detail: OUTPATIENT SURGERY
Discharge: HOME | End: 2022-10-18
Attending: INTERNAL MEDICINE | Admitting: INTERNAL MEDICINE
Payer: MEDICARE

## 2022-10-18 ENCOUNTER — APPOINTMENT (OUTPATIENT)
Dept: PULMONOLOGY | Facility: CLINIC | Age: 77
End: 2022-10-18

## 2022-10-18 VITALS
RESPIRATION RATE: 16 BRPM | SYSTOLIC BLOOD PRESSURE: 135 MMHG | BODY MASS INDEX: 18.89 KG/M2 | OXYGEN SATURATION: 97 % | HEART RATE: 60 BPM | TEMPERATURE: 98.3 F | WEIGHT: 160 LBS | DIASTOLIC BLOOD PRESSURE: 72 MMHG | HEIGHT: 77 IN

## 2022-10-18 DIAGNOSIS — J84.9 ILD (INTERSTITIAL LUNG DISEASE) (HCC): Primary | ICD-10-CM

## 2022-10-18 DIAGNOSIS — I27.20 PULMONARY HYPERTENSION (HCC): ICD-10-CM

## 2022-10-18 LAB
ANION GAP SERPL CALCULATED.3IONS-SCNC: 5 MMOL/L (ref 4–13)
BUN SERPL-MCNC: 19 MG/DL (ref 5–25)
CALCIUM SERPL-MCNC: 9.4 MG/DL (ref 8.3–10.1)
CHLORIDE SERPL-SCNC: 106 MMOL/L (ref 96–108)
CO2 SERPL-SCNC: 25 MMOL/L (ref 21–32)
CREAT SERPL-MCNC: 0.81 MG/DL (ref 0.6–1.3)
GFR SERPL CREATININE-BSD FRML MDRD: 86 ML/MIN/1.73SQ M
GLUCOSE P FAST SERPL-MCNC: 104 MG/DL (ref 65–99)
GLUCOSE SERPL-MCNC: 104 MG/DL (ref 65–140)
POTASSIUM SERPL-SCNC: 3.9 MMOL/L (ref 3.5–5.3)
SODIUM SERPL-SCNC: 136 MMOL/L (ref 135–147)

## 2022-10-18 PROCEDURE — 82810 BLOOD GASES O2 SAT ONLY: CPT | Performed by: INTERNAL MEDICINE

## 2022-10-18 PROCEDURE — C1751 CATH, INF, PER/CENT/MIDLINE: HCPCS | Performed by: INTERNAL MEDICINE

## 2022-10-18 PROCEDURE — C1894 INTRO/SHEATH, NON-LASER: HCPCS | Performed by: INTERNAL MEDICINE

## 2022-10-18 PROCEDURE — 93451 RIGHT HEART CATH: CPT | Performed by: INTERNAL MEDICINE

## 2022-10-18 PROCEDURE — C1769 GUIDE WIRE: HCPCS | Performed by: INTERNAL MEDICINE

## 2022-10-18 PROCEDURE — 80048 BASIC METABOLIC PNL TOTAL CA: CPT | Performed by: INTERNAL MEDICINE

## 2022-10-18 RX ORDER — TREPROSTINIL 16-32-48
16 KIT INHALATION 4 TIMES DAILY
Qty: 112 EACH | Refills: 11 | Status: SHIPPED | OUTPATIENT
Start: 2022-10-18

## 2022-10-18 RX ORDER — LIDOCAINE HYDROCHLORIDE 10 MG/ML
INJECTION, SOLUTION EPIDURAL; INFILTRATION; INTRACAUDAL; PERINEURAL AS NEEDED
Status: DISCONTINUED | OUTPATIENT
Start: 2022-10-18 | End: 2022-10-18 | Stop reason: HOSPADM

## 2022-10-18 RX ORDER — SODIUM CHLORIDE 9 MG/ML
INJECTION, SOLUTION INTRAVENOUS
Status: COMPLETED | OUTPATIENT
Start: 2022-10-18 | End: 2022-10-18

## 2022-10-18 NOTE — TELEPHONE ENCOUNTER
Phone call from patient is stating that Dr Connor Lewis suggested a new medication based on cath done today  Pt believes it is an inhaler  Please advise the name of this for patient  Did not see d/c yet completed   Please advise

## 2022-10-18 NOTE — TELEPHONE ENCOUNTER
I have called Joellen Glynn on the phone today  He had right heart catheterization done earlier  He will be started on Tyvaso  He is agreeable to fill the prescription for the Ofev  He will call my office with any questions

## 2022-10-18 NOTE — INTERVAL H&P NOTE
Update: (This section must be completed if the H&P was completed greater than 24 hrs to procedure or admission)    H&P reviewed  After examining the patient, I find no changed to the H&P since it had been written  Vitals:    10/18/22 0705   BP: 129/75   Pulse: 68   Resp: 16   Temp: 97 5 °F (36 4 °C)   SpO2: 97%         Patient re-evaluated   Accept as history and physical     Laureano Holt/October 18, 2022/7:59 AM

## 2022-10-19 ENCOUNTER — DOCUMENTATION (OUTPATIENT)
Dept: CARDIOLOGY CLINIC | Facility: CLINIC | Age: 77
End: 2022-10-19

## 2022-10-19 ENCOUNTER — APPOINTMENT (OUTPATIENT)
Dept: PULMONOLOGY | Facility: CLINIC | Age: 77
End: 2022-10-19

## 2022-10-19 ENCOUNTER — TELEPHONE (OUTPATIENT)
Dept: SLEEP CENTER | Facility: CLINIC | Age: 77
End: 2022-10-19

## 2022-10-19 NOTE — PROGRESS NOTES
[facility]   Pt will need to apply for Assitst-forms sent    Tyvaso DPI approved   1/1/22-10/18/23        ID [id number]  [facility] Jos[facility]

## 2022-10-19 NOTE — TELEPHONE ENCOUNTER
----- Message from Sherly Panda MD sent at 10/19/2022 11:47 AM EDT -----  Approved    ----- Message -----  From: Erich Davey  Sent: 10/17/2022  10:43 AM EDT  To: Sleep Medicine [facility] Provider    This Home sleep study needs approval      If approved please sign and return to clerical pool  If denied please include reasons why  Also provide alternative testing if warranted  Please sign and return to clerical pool

## 2022-10-20 ENCOUNTER — TELEPHONE (OUTPATIENT)
Dept: CARDIOLOGY CLINIC | Facility: CLINIC | Age: 77
End: 2022-10-20

## 2022-10-20 ENCOUNTER — CLINICAL SUPPORT (OUTPATIENT)
Dept: PULMONOLOGY | Facility: CLINIC | Age: 77
End: 2022-10-20
Payer: MEDICARE

## 2022-10-20 DIAGNOSIS — J84.9 ILD (INTERSTITIAL LUNG DISEASE) (HCC): Primary | ICD-10-CM

## 2022-10-20 PROCEDURE — G0239 OTH RESP PROC, GROUP: HCPCS

## 2022-10-20 NOTE — TELEPHONE ENCOUNTER
Patient will needs assist program-his co-pay is 3,000-application sent       Tyvaso [id number] [facility] approved  1/22-10/18/23

## 2022-10-21 ENCOUNTER — CLINICAL SUPPORT (OUTPATIENT)
Dept: PULMONOLOGY | Facility: CLINIC | Age: 77
End: 2022-10-21
Payer: MEDICARE

## 2022-10-21 DIAGNOSIS — J84.9 ILD (INTERSTITIAL LUNG DISEASE) (HCC): Primary | ICD-10-CM

## 2022-10-21 PROCEDURE — G0239 OTH RESP PROC, GROUP: HCPCS

## 2022-10-21 NOTE — TELEPHONE ENCOUNTER
Attempted to call patient back but the voicemail is full  The patient does not need labs to start he will need to have a liver function lab test done once a month for the first 2 months

## 2022-10-21 NOTE — PROGRESS NOTES
Pt LM re: Dr Nicole Mcgarry placed him on Ofev and they should be arriving in the mail today  He wanted to know if there was anything he was supposed to do before he started this new med?   Please advise: [phone]

## 2022-10-21 NOTE — TELEPHONE ENCOUNTER
Patient is calling again, he is asking if someone can please return his call as he has questions regarding the start of his OFEV medication   He wants to begin as soon as possible and since it was an expensive medication he wants to make sure he is able to start this and doesn't need any additional lab work or testing done first  Please advise

## 2022-10-25 ENCOUNTER — CLINICAL SUPPORT (OUTPATIENT)
Dept: PULMONOLOGY | Facility: CLINIC | Age: 77
End: 2022-10-25
Payer: MEDICARE

## 2022-10-25 DIAGNOSIS — J84.9 ILD (INTERSTITIAL LUNG DISEASE) (HCC): Primary | ICD-10-CM

## 2022-10-25 PROCEDURE — G0239 OTH RESP PROC, GROUP: HCPCS

## 2022-10-25 NOTE — PROGRESS NOTES
Pulmonary Rehabilitation Plan of Care   30 Day Reassessment      Today's date: 10/25/2022   # of Exercise Sessions Completed: 8  Patient name: Rylie San      : 1945  Age: 69 y o  MRN: [id number]  Referring Physician: Lorie Lara*  Pulmonologist: Gaby Snyder MD  Provider: [facility]  Clinician: Jan Serrano MS, CEP    Dx:   Encounter Diagnosis   Name Primary? • ILD (interstitial lung disease) ([facility] 75 ) Yes     Date of onset: 2022      SUMMARY OF PROGRESS:  Horace Pinto is compliant attending pulmonary rehab exercise sessions 2x/wk  The patient tolerates 40-45 mins at 2 1 - 2 3 METs on supplemental O2 3L/min via nasal canula  Weight training will be added in the next few sessions  Resting SpO2 92 - 99% with decreased O2 saturation during exercise 87 - 97%  Will titrate supplemental O2 to maintain SpO2 >90% Normal resting BP  110/70 - 128/80  RHR 79 - 90,  ExHR 80 - 105  The patient reports dyspnea and dizziness during exercise  Rating of perceived dyspnea with exercise 3-6/10 at max METs  He has not begun a regular exercise program at home, and states "I was not sure I was allowed to"  Horace Pinto was encouraged to begin exercise on his own, starting with walking on opposite days of rehab  Depression and anxiety screening using the PHQ-9 and DON-7 was not reassessed today but will be as needed  He still reports some depression since his wife passed away but his anxiety has improved with using his O2 at home  He expressed concern about doing things around the house and his SpO2 dropping, sometimes in the high 70s and low 80s  He was advised to call Dr Curry Howard office about inquiring about turning his O2 flow up during exertion  Horace Pinto continues to recieve social/emotional support from his daughter and neighbors  Patient attends group educational classes on pulmonary disease   Pursed lipped breathing and Diaphragmatic breathing continues to be demonstrated, practiced, and reinforced with the patient  His exercise program will be progressed as tolerated  The patient has the following personal goals he hopes to achieve by discharge: weight gain, increased strength  Pt will continue to be educated on lifestyle modifications and encouraged to supplement with a home exercise program to reach the following goals: begin home walking in the next 30 days      Medication compliance: Yes   Comments: Pt reports to be compliant with medications  Fall Risk: Low   Comments: Ambulates with a steady gait with no assist device and Denies a fall in the past 6 months    Smoking: Former user  Quit 50 years ago    RPD at Rest: 0-3/10  RPD with Exercise:  3-6/10    Assessment of progression of lung disease and functional status:  CAT:  initial  Shortness of breath questionnaire: 68/120 initial      EXERCISE ASSESSMENT and PLAN    Current Exercise Program in Rehab:       Frequency:  2 days/week   Supplement with home exercise 2+ days/wk as tolerated        Minutes: 40-45         METS: 2 1-2 3              SpO2: >90%              RPD: 4-6                      HR: RHR+30bpm   RPE: 4-5         Modalities: Treadmill, UBE, NuStep and Recumbent bike      Exercise Progression 30 Day Goals :    Frequency: 2 days/week    Supplement with home exercise 2+ days/wk as tolerated       Minutes: 40-45        METS: 2 5-3 5              SpO2: >90%              RPD: 4-6                      HR: RHR+30bpm   RPE: 4-5        Modalities: Treadmill, UBE, Lifecycle, NuStep and Recumbent bike     Strength trainin-3 days / week  12-15 repetitions  1-2 sets per modality   Will be added following 2-3 weeks of monitored exercise sessions   Modalities: Leg Press, Chest Press, Lateral Raise, Arm Extension, Arm Curl, Front Raises and Shoulder Shrugs    Oxygen Needs: supplemental O2 via nasal cannula @ 2L/min at rest and supplemental O2 via nasal cannula @3L/min with exercise  Oxygen Goal: Maintain SpO2>90% during exercise    Home Exercise: none, was walking 2-3 miles daily prior to COVID infection in May  Education: pursed lipped breathing, diaphragmatic breathing, relaxation breathing, home exercise, benefits of exercise for pulmonary disease, RPE scale, RPD scale, O2 saturation monitoring and appropriate O2 response to exercise    Goals: reduced score on  USCD Shortness of Breath Questionnaire, Improved 6MW distance by 10%, reduced dyspnea during exercise (0-3/10), improved exercise tolerance (max METs tolerated in pulmonary rehab), SpO2 >90% during exercise, attend pulmonary rehab regularly, decrease sitting time at home and start a walking program  Progressing:  Pt is progressing and showing improvement  toward the following goals:  attends regularly  , Patient will begin home walking in the next 30 days, Will continue to educate and progress as tolerated  Plan: Titrate supplemental oxygen as needed to maintain SpO2>90% with exercise, learn to conserve energy with ADLs , practice breathing techniques 3x/day and reduce time sitting at home    Readiness to change: Action:  (Changing behavior)      NUTRITION ASSESSMENT AND PLAN    Weight control:    Starting weight: 172 6 lb   Current weight: 168 4 lb    Diabetes: N/A    Goals:choose lean meat (93-95%), increase intake of fish, shellfish, use low fat dairy, eat 3 or more servings of whole grains a day, Eat 4-5 cups of fruits and vegetables daily, choose low sodium processed foods, eliminate butter, eliminate or choose low-fat sweets, daily saturated fat intake <7%/13g and weight gain  Education: hydration  Progressing:Pt has not made progress toward the following goals: no weight gain noted- lost 4lbs  , Will continue to educate and progress as tolerated    Plan: Education class: Reading Food Labels, Education Class: Heart Healthy Eating, replace refined grain bread with whole grain bread, replace unhealthy snacks with fruits & vegs, avoid processed foods, drink more water, learn how to read food labels, replace sugar with stevia or truvia, slow down eating time, decrease carbonated beverages and decrease gas producing foods  Readiness to change: Preparation:  (Getting ready to change)       PSYCHOSOCIAL ASSESSMENT AND PLAN    Emotional:  Depression assessment:  PHQ-9 = 11 10-14 = Moderate Depression            Anxiety measure:  DON-7 = 1 0-4  = Not anxious  Self-reported stress level: 8   Social support: Patient reports excellent emotional/social support from daughter and neighbors- he has been relying on them a lot after death of his wife 1 month ago    Goals:  Reduce perceived stress to 1-3/10, improved [facility] QOL < 27, PHQ-9 - reduced severity by one level, Feelings in [facility] Score < 3, Physical Fitness in [facility] Score < 3, Daily Activity in [facility] Score < 3, Social Activities in [facility] Score < 3, Pain in [facility] Score < 3, Overall Health in [facility] Score < 3, Quality of Life in [facility] Score < 3 , Change in Health in [place] Score < 3 , Increased interest in doing things, improved sleep, Improved appetite, improved positive thoughts of well being and increased energy  Education: signs/sxs of depression, benefits of a positive support system, stress management techniques, depression and CAD and benefits of mental health counseling    Progressing:Pt is progressing and showing improvement  toward the following goals:  decreased anxiety with using home O2   , Pt has not made progress toward the following goals: no changes in depression  , Will continue to educate and progress as tolerated  , no repeat PHQ9 and GAD7 done today  Plan: Class: Stress and Your Health, Class: Relaxation, Refer to [employer], Practice relaxation techniques, Exercise, Enjoy a hobby, Keep a positive mindset, Enjoy family, Return to previous social activity and Repeat PHQ-9 every 30 days if score >5  Readiness to change: Preparation:  (Getting ready to change)       OTHER CORE COMPONENTS     Tobacco:   Social History     Tobacco Use   Smoking Status Former Smoker   • Packs/day: 0 50   • Years: 5 00   • Pack years: 2 50   • Types: Cigarettes   • Start date:    • Quit date: 26   • Years since quittin 8   Smokeless Tobacco Never Used       Tobacco Use Intervention: Referral to tobacco expert:   Pt quit 50 years ago   and has abstained    Blood pressure:    Restin//80      Goals: consistent BP < 130/80, reduced dietary sodium <2300mg, moderate intensity exercise >150 mins/wk and medication compliance  Education:  pathophysiology of pulmonary disease, control coughing, inspiratory muscle training and environmental triggers  Progressing:Pt is progressing and showing improvement  toward the following goals:  normal resting BP   , Will continue to educate and progress as tolerated    Plan: Avoid Processed foods, engage in regular exercise, eliminate salt shaker at the table, use salt substitutes and check labels for sodium content  Readiness to change: Action:  (Changing behavior)

## 2022-10-27 ENCOUNTER — CLINICAL SUPPORT (OUTPATIENT)
Dept: PULMONOLOGY | Facility: CLINIC | Age: 77
End: 2022-10-27
Payer: MEDICARE

## 2022-10-27 ENCOUNTER — TELEPHONE (OUTPATIENT)
Dept: CARDIOLOGY CLINIC | Facility: CLINIC | Age: 77
End: 2022-10-27

## 2022-10-27 DIAGNOSIS — J84.9 ILD (INTERSTITIAL LUNG DISEASE) (HCC): Primary | ICD-10-CM

## 2022-10-27 PROCEDURE — G0239 OTH RESP PROC, GROUP: HCPCS

## 2022-10-28 ENCOUNTER — TELEPHONE (OUTPATIENT)
Dept: PULMONOLOGY | Facility: CLINIC | Age: 77
End: 2022-10-28

## 2022-10-28 NOTE — TELEPHONE ENCOUNTER
Spoke with patient in regards to a sooner appointment with Dr Lissett Ferraro at our [facility] office next week, the week of 10/31  Patient stated his daughter needs to be with him for this appointment & he will call us back to make sure the times available work for them both

## 2022-10-31 ENCOUNTER — TRANSCRIBE ORDERS (OUTPATIENT)
Dept: FAMILY MEDICINE CLINIC | Facility: CLINIC | Age: 77
End: 2022-10-31

## 2022-10-31 ENCOUNTER — TREATMENT (OUTPATIENT)
Dept: FAMILY MEDICINE CLINIC | Facility: CLINIC | Age: 77
End: 2022-10-31

## 2022-10-31 DIAGNOSIS — R05.9 COUGH: ICD-10-CM

## 2022-10-31 DIAGNOSIS — R63.4 WEIGHT LOSS: Primary | ICD-10-CM

## 2022-10-31 DIAGNOSIS — G47.00 INSOMNIA, UNSPECIFIED TYPE: ICD-10-CM

## 2022-10-31 DIAGNOSIS — G47.00 INSOMNIA, UNSPECIFIED TYPE: Primary | ICD-10-CM

## 2022-10-31 PROBLEM — F51.01 PRIMARY INSOMNIA: Chronic | Status: ACTIVE | Noted: 2022-10-31

## 2022-10-31 RX ORDER — GUAIFENESIN AND CODEINE PHOSPHATE 100; 10 MG/5ML; MG/5ML
SOLUTION ORAL
Qty: 236 ML | Refills: 5 | Status: CANCELLED
Start: 2022-10-31

## 2022-10-31 RX ORDER — ZOLPIDEM TARTRATE 5 MG/1
5 TABLET ORAL
Qty: 30 TABLET | Refills: 4 | Status: CANCELLED | OUTPATIENT
Start: 2022-10-31

## 2022-10-31 RX ORDER — GUAIFENESIN AND CODEINE PHOSPHATE 100; 10 MG/5ML; MG/5ML
SOLUTION ORAL
Qty: 120 ML | Refills: 5 | Status: SHIPPED | OUTPATIENT
Start: 2022-10-31

## 2022-10-31 RX ORDER — MEGESTROL ACETATE 125 MG/ML
625 SUSPENSION ORAL DAILY
Qty: 150 ML | Refills: 3 | Status: SHIPPED | OUTPATIENT
Start: 2022-10-31

## 2022-10-31 RX ORDER — ZOLPIDEM TARTRATE 5 MG/1
TABLET ORAL
Qty: 60 TABLET | Refills: 3 | Status: SHIPPED | OUTPATIENT
Start: 2022-10-31 | End: 2022-11-07 | Stop reason: ALTCHOICE

## 2022-10-31 NOTE — PROGRESS NOTES
S/w patient : continued and worsening fatigue/ tiredness / poor sleep (no damon depression) weakness of muscles/ down to 157 lbs (> 20% body weight ) ;     Has had help from friend who is nutritionist but still losing weight ;     Discussed Megace and s/e : he is willing to try it ; as well ambien given for sleep

## 2022-10-31 NOTE — TELEPHONE ENCOUNTER
Pt has called back in - checking for sooner appt offered pt 11/02 at 8:am - denied  Offered pt Friday 11/04 3:40 - he will call us back and let us know if this time works better for him  Appt has been placed on hold until pt calls back

## 2022-11-01 ENCOUNTER — APPOINTMENT (OUTPATIENT)
Dept: PULMONOLOGY | Facility: CLINIC | Age: 77
End: 2022-11-01

## 2022-11-03 ENCOUNTER — APPOINTMENT (OUTPATIENT)
Dept: PULMONOLOGY | Facility: CLINIC | Age: 77
End: 2022-11-03

## 2022-11-07 ENCOUNTER — TREATMENT (OUTPATIENT)
Dept: FAMILY MEDICINE CLINIC | Facility: CLINIC | Age: 77
End: 2022-11-07

## 2022-11-07 ENCOUNTER — OFFICE VISIT (OUTPATIENT)
Dept: PULMONOLOGY | Facility: CLINIC | Age: 77
End: 2022-11-07

## 2022-11-07 ENCOUNTER — TRANSCRIBE ORDERS (OUTPATIENT)
Dept: FAMILY MEDICINE CLINIC | Facility: CLINIC | Age: 77
End: 2022-11-07

## 2022-11-07 VITALS
HEIGHT: 77 IN | RESPIRATION RATE: 18 BRPM | SYSTOLIC BLOOD PRESSURE: 110 MMHG | TEMPERATURE: 97.4 F | WEIGHT: 164 LBS | BODY MASS INDEX: 19.36 KG/M2 | OXYGEN SATURATION: 91 % | HEART RATE: 86 BPM | DIASTOLIC BLOOD PRESSURE: 60 MMHG

## 2022-11-07 DIAGNOSIS — R06.09 DOE (DYSPNEA ON EXERTION): ICD-10-CM

## 2022-11-07 DIAGNOSIS — G47.00 INSOMNIA, UNSPECIFIED TYPE: ICD-10-CM

## 2022-11-07 DIAGNOSIS — G47.00 INSOMNIA, UNSPECIFIED TYPE: Primary | ICD-10-CM

## 2022-11-07 DIAGNOSIS — J84.9 ILD (INTERSTITIAL LUNG DISEASE) (HCC): ICD-10-CM

## 2022-11-07 DIAGNOSIS — J96.11 CHRONIC HYPOXEMIC RESPIRATORY FAILURE (HCC): ICD-10-CM

## 2022-11-07 DIAGNOSIS — F32.A DEPRESSION, UNSPECIFIED DEPRESSION TYPE: Primary | ICD-10-CM

## 2022-11-07 DIAGNOSIS — G47.33 OSA (OBSTRUCTIVE SLEEP APNEA): ICD-10-CM

## 2022-11-07 RX ORDER — TEMAZEPAM 15 MG/1
CAPSULE ORAL
Qty: 10 CAPSULE | Refills: 0 | Status: SHIPPED | OUTPATIENT
Start: 2022-11-07 | End: 2022-11-07 | Stop reason: SDUPTHER

## 2022-11-07 RX ORDER — FLUOXETINE HYDROCHLORIDE 20 MG/1
20 CAPSULE ORAL DAILY
Qty: 30 CAPSULE | Refills: 5 | Status: SHIPPED | OUTPATIENT
Start: 2022-11-07

## 2022-11-07 RX ORDER — TEMAZEPAM 15 MG/1
CAPSULE ORAL
Qty: 10 CAPSULE | Refills: 0 | Status: SHIPPED | OUTPATIENT
Start: 2022-11-07 | End: 2022-11-12 | Stop reason: SDUPTHER

## 2022-11-07 RX ORDER — TREPROSTINIL 48 UG/1
INHALANT ORAL
COMMUNITY
Start: 2022-10-24

## 2022-11-07 RX ORDER — ZOLPIDEM TARTRATE 10 MG/1
TABLET ORAL
COMMUNITY
Start: 2022-11-01 | End: 2022-11-12

## 2022-11-07 NOTE — PROGRESS NOTES
Office Progress Note - Pulmonary    Jeanmarie oJe 69 y o  male MRN: [id number]    Encounter: [id number]      Assessment:  • Interstitial lung disease  • Pulmonary hypertension  • Chronic hypoxemic respiratory failure  • Worsening dyspnea on exertion  • Depression  • Obstructive sleep apnea  • Allergic rhinitis  Plan:   •  Continue Tyvaso  • Ofev 150 milligrams twice a day  • Oxygen supplement 24 hours a day  • Resume pulmonary rehab when able  • Prozac 20 milligrams once a day  • Referral to palliative care  • Fluticasone nasal spray 2 sprays to each nostril once a day  • Follow-up in 6 weeks  Discussion:   The patient's symptoms overall are worsening  This is mainly because of his progressive interstitial lung disease as well as the pulmonary hypertension  I had a long discussion with him and his daughter regarding his symptoms  I explained to him that the hope is that the Tyvaso will help improve his pulmonary hypertension and this will help his symptoms  Also we talked about his symptoms my continue to worsen despite all efforts  I have suggested to the patient that it is a good idea to see palliative Care Medicine at this stage of his disease  He agreed and we made the referral   He is frustrated and angry at times  He is depressed  All these factors are not allowing him to sleep appropriately at night  I have started him on Prozac 20 milligrams once a day  I will see him in 6 weeks in a follow-up visit  Subjective: The patient is here for a follow-up visit  He is very upset and frustrated with his symptoms  They continue to limit his activities  He is using his oxygen  Trying to do whatever he can do to improve his overall functional status  He was just started on Tyvaso 4 times a day  His dose will be titrated up  He is using the oxygen 24 hours a day  He can not sleep at night  He was started on Ambien but it is not helping him    The Ambien was discontinued and he just got started on Restoril which he will  the prescription today  He is trying to eat but he is losing weight  Review of systems:  A 12 point system review is done and aside from what is stated above the rest of the review of systems is negative  Family history and social history are reviewed  Medications list is reviewed  Vitals: Blood pressure 110/60, pulse 86, temperature (!) 97 4 °F (36 3 °C), temperature source Tympanic, resp  rate 18, height 6' 5" (1 956 m), weight 74 4 kg (164 lb), SpO2 91 %  ,     Physical Exam  Gen: Awake, alert, oriented x 3, no acute distress  HEENT: Mucous membranes moist, no oral lesions, no thrush  NECK: No accessory muscle use, JVP not elevated  Cardiac: Regular, single S1, single S2, no murmurs, no rubs, no gallops  Lungs:  Bilateral posterior crackles  Abdomen: normoactive bowel sounds, soft nontender, nondistended, no rebound or rigidity, no guarding  Extremities: no cyanosis, no clubbing, no edema  Neuro:  Grossly nonfocal   Skin:  No rash  CT of the chest is reviewed on the [facility] system  It showed worsening interstitial lung disease      Lab Results   Component Value Date    SODIUM 136 10/18/2022    K 3 9 10/18/2022     10/18/2022    CO2 25 10/18/2022    BUN 19 10/18/2022    CREATININE 0 81 10/18/2022    GLUC 104 10/18/2022    CALCIUM 9 4 10/18/2022

## 2022-11-08 ENCOUNTER — CLINICAL SUPPORT (OUTPATIENT)
Dept: PULMONOLOGY | Facility: CLINIC | Age: 77
End: 2022-11-08

## 2022-11-08 ENCOUNTER — TELEPHONE (OUTPATIENT)
Dept: CARDIOLOGY CLINIC | Facility: CLINIC | Age: 77
End: 2022-11-08

## 2022-11-08 DIAGNOSIS — J84.9 ILD (INTERSTITIAL LUNG DISEASE) (HCC): Primary | ICD-10-CM

## 2022-11-08 DIAGNOSIS — J30.89 NON-SEASONAL ALLERGIC RHINITIS, UNSPECIFIED TRIGGER: ICD-10-CM

## 2022-11-08 NOTE — TELEPHONE ENCOUNTER
Pt statred the DPI Tyvaso -he will continue to increase to the goal of 64 mcg 4 x a day         [facility]-nurse [phone]

## 2022-11-10 ENCOUNTER — APPOINTMENT (OUTPATIENT)
Dept: PULMONOLOGY | Facility: CLINIC | Age: 77
End: 2022-11-10

## 2022-11-12 ENCOUNTER — NURSE TRIAGE (OUTPATIENT)
Dept: OTHER | Facility: OTHER | Age: 77
End: 2022-11-12

## 2022-11-12 ENCOUNTER — TRANSCRIBE ORDERS (OUTPATIENT)
Dept: FAMILY MEDICINE CLINIC | Facility: CLINIC | Age: 77
End: 2022-11-12

## 2022-11-12 DIAGNOSIS — J30.89 NON-SEASONAL ALLERGIC RHINITIS, UNSPECIFIED TRIGGER: ICD-10-CM

## 2022-11-12 DIAGNOSIS — G47.00 INSOMNIA, UNSPECIFIED TYPE: ICD-10-CM

## 2022-11-12 RX ORDER — TEMAZEPAM 30 MG/1
CAPSULE ORAL
Qty: 30 CAPSULE | Refills: 4 | Status: SHIPPED | OUTPATIENT
Start: 2022-11-12

## 2022-11-12 RX ORDER — FLUTICASONE PROPIONATE 50 MCG
2 SPRAY, SUSPENSION (ML) NASAL DAILY
Qty: 16 G | Refills: 0 | Status: SHIPPED | OUTPATIENT
Start: 2022-11-12

## 2022-11-12 NOTE — TELEPHONE ENCOUNTER
Reason for Disposition  • [1] Caller requesting a prescription renewal (no refills left), no triage required, AND [2] triager able to renew prescription per department policy    Answer Assessment - Initial Assessment Questions  1  DRUG NAME: "What medicine do you need to have refilled?"      flonase  2  REFILLS REMAINING: "How many refills are remaining?" (Note: The label on the medicine or pill bottle will show how many refills are remaining  If there are no refills remaining, then a renewal may be needed )      0    4  PRESCRIBING HCP: "Who prescribed it?" Reason: If prescribed by specialist, call should be referred to that group        Dr Adriana Nielsen    Protocols used: MEDICATION REFILL AND RENEWAL CALL-ADULT-

## 2022-11-12 NOTE — TELEPHONE ENCOUNTER
Regarding:  out of medication for 2 days  ----- Message from [facility] sent at 11/12/2022 11:49 AM EST -----  "Im a patient of Dr Harjit Bruce and fam been completely out of my flonase for the last two days, im not sure what happened but I would like my prescription to be sent into [facility] pharmacy"

## 2022-11-12 NOTE — TELEPHONE ENCOUNTER
Patient has been completely out of Flonase for 2 days  Requesting a script be sent to his pharmacy      Will send emergency refill, but will need further refills from the office

## 2022-11-14 RX ORDER — FLUTICASONE PROPIONATE 50 MCG
SPRAY, SUSPENSION (ML) NASAL
Qty: 48 ML | Refills: 1 | Status: SHIPPED | OUTPATIENT
Start: 2022-11-14

## 2022-11-15 ENCOUNTER — CLINICAL SUPPORT (OUTPATIENT)
Dept: PULMONOLOGY | Facility: CLINIC | Age: 77
End: 2022-11-15

## 2022-11-15 DIAGNOSIS — J84.9 ILD (INTERSTITIAL LUNG DISEASE) (HCC): Primary | ICD-10-CM

## 2022-11-17 ENCOUNTER — CLINICAL SUPPORT (OUTPATIENT)
Dept: PULMONOLOGY | Facility: CLINIC | Age: 77
End: 2022-11-17

## 2022-11-17 DIAGNOSIS — J84.9 ILD (INTERSTITIAL LUNG DISEASE) (HCC): Primary | ICD-10-CM

## 2022-11-17 NOTE — PROGRESS NOTES
Pulmonary Rehabilitation Plan of Care   60 Day Reassessment      Today's date: 2022   # of Exercise Sessions Completed: 12  Patient name: Ronell Nissen      : 1945  Age: 69 y o  MRN: [id number]  Referring Physician: Komal Noriega*  Pulmonologist: Jules Polk MD  Provider: [facility]  Clinician: Gerrad Whitney MS, CEP    Dx:   Encounter Diagnosis   Name Primary? • ILD (interstitial lung disease) ([email or web address] ) Yes     Date of onset: 2022      SUMMARY OF PROGRESS: This is Shan's 61 day note in pulmonary rehab  He tolerates 40-45 minutes of exercise at 2 1-2 6 METs  Resting SpO2 98 - 99% with decreased O2 saturation during exercise 86 - 96% on 3L supplemental O2 via nasal cannula  Will titrate supplemental O2 to maintain SpO2 >90% Shan c/o 0-2/10 dyspnea at rest and increased dyspnea (2-5/10) with exercise  Normal resting BP  118/70 - 130/78  RHR 67 - 77,  ExHR 70 - 105  He has not begun a regular exercise program at home but has access to his community gym 1 block away  I encouraged Nasreen Callaway to try going to the gym for 15-20 min starting with 1 day/week and building to 2 additional days/week in the coming weeks  He will also add weight lifting during the next 30 days of pulmonary rehab  He does reports occasional lightheadedness at rest and immediately after some activity but reports that it is typically brief and does not limit his activity  Nasreen Callaway still reports some depression since his wife passed away but his depression and anxiety has improved  Nasreen Callaway continues to recieve social/emotional support from his daughter and neighbors  He attends group educational classes on pulmonary disease  His exercise program will be progressed as tolerated  Nasreen Callaway has the following personal goals he hopes to achieve by discharge: weight gain, increased strength    He will continue to be educated on lifestyle modifications and exercise intensity will be progressed as tolerated to maintain RPE 4-6 and SpO2 >90%     Medication compliance: Yes   Comments: Pt reports to be compliant with medications  Fall Risk: Low   Comments: Ambulates with a steady gait with no assist device and Denies a fall in the past 6 months    Smoking: Former user  Quit 50 years ago    RPD at Rest: 0-2/10  RPD with Exercise:  2-5/10    Assessment of progression of lung disease and functional status:  CAT:  initial  Shortness of breath questionnaire: 68/120 initial      EXERCISE ASSESSMENT and PLAN    Current Exercise Program in Rehab:       Frequency:  2 days/week        Minutes: 40-45        METS: 2 1-2 6              SpO2: >90%              RPD: 4-6                      HR:  bpm   RPE: 4-5         Modalities: Treadmill, UBE, NuStep and Recumbent bike (Treadmill sometimes avoided because of lightheadedness)     Exercise Progression 30 Day Goals :    Frequency: 2 days/week    Supplement with home exercise 1-2 days/wk as tolerated       Minutes: 40-45        METS: 2 2-2 8              SpO2: >90%              RPD: 4-6                      HR: RHR+30bpm   RPE: 4-5        Modalities: Treadmill, UBE, Lifecycle, NuStep and Recumbent bike     Strength trainin-3 days / week  12-15 repetitions  1-2 sets per modality   Will be added following 2-3 weeks of monitored exercise sessions   Modalities: Leg Press, Chest Press, Lateral Raise, Arm Extension, Arm Curl, Front Raises and Shoulder Shrugs    Oxygen Needs: supplemental O2 via nasal cannula @ 2L/min at rest and supplemental O2 via nasal cannula @3L/min with exercise  Oxygen Goal: Maintain SpO2>90% during exercise    Home Exercise: none, was walking 2-3 miles daily prior to COVID infection in May  Education: pursed lipped breathing, diaphragmatic breathing, relaxation breathing, home exercise, benefits of exercise for pulmonary disease, RPE scale, RPD scale, O2 saturation monitoring and appropriate O2 response to exercise    Goals: reduced score on  USCD Shortness of Breath Questionnaire, Improved 6MW distance by 10%, reduced dyspnea during exercise (0-3/10), improved exercise tolerance (max METs tolerated in pulmonary rehab), SpO2 >90% during exercise, attend pulmonary rehab regularly, decrease sitting time at home and start a walking program  Progressing:  Pt is progressing and showing improvement  toward the following goals:  attends regularly; increased exercise intensity  , Patient will start exercising at community gym in the next 30 days, Will continue to educate and progress as tolerated  Plan: Titrate supplemental oxygen as needed to maintain SpO2>90% with exercise, learn to conserve energy with ADLs , practice breathing techniques 3x/day and reduce time sitting at home    Readiness to change: Action:  (Changing behavior)      NUTRITION ASSESSMENT AND PLAN    Weight control:    Starting weight: 172 6 lb   Current weight: 178 4 lb    Diabetes: N/A    Goals:choose lean meat (93-95%), increase intake of fish, shellfish, use low fat dairy, eat 3 or more servings of whole grains a day, Eat 4-5 cups of fruits and vegetables daily, choose low sodium processed foods, eliminate butter, eliminate or choose low-fat sweets, daily saturated fat intake <7%/13g and weight gain  Education: hydration  Progressing:Pt is progressing and showing improvement  toward the following goals:  weight gain of 6 lbs noted   , Will continue to educate and progress as tolerated    Plan: Education class: Reading Food Labels, Education Class: Heart Healthy Eating, replace refined grain bread with whole grain bread, replace unhealthy snacks with fruits & vegs, avoid processed foods, drink more water, learn how to read food labels, replace sugar with stevia or truvia, slow down eating time, decrease carbonated beverages and decrease gas producing foods  Readiness to change: Preparation:  (Getting ready to change)       PSYCHOSOCIAL ASSESSMENT AND PLAN    Emotional:  Depression assessment:  PHQ-9 = 11 10-14 = Moderate Depression; not reassessed today            Anxiety measure:  DON-7 = 1 0-4  = Not anxious; not reassessed today  Self-reported stress level: 8   Social support: Patient reports excellent emotional/social support from daughter and neighbors- he has been relying on them a lot after death of his wife 1 month ago    Goals:  Reduce perceived stress to 1-3/10, improved [facility] QOL < 27, PHQ-9 - reduced severity by one level, Feelings in [facility] Score < 3, Physical Fitness in [facility] Score < 3, Daily Activity in [facility] Score < 3, Social Activities in [facility] Score < 3, Pain in [facility] Score < 3, Overall Health in [facility] Score < 3, Quality of Life in [facility] Score < 3 , Change in Health in [place] Score < 3 , Increased interest in doing things, improved sleep, Improved appetite, improved positive thoughts of well being and increased energy  Education: signs/sxs of depression, benefits of a positive support system, stress management techniques, depression and CAD and benefits of mental health counseling    Progressing:Pt is progressing and showing improvement  toward the following goals:  decreased anxiety and depression  , Will continue to educate and progress as tolerated  , no repeat PHQ9 and GAD7 done today  Plan: Class: Stress and Your Health, Class: Relaxation, Refer to [employer], Practice relaxation techniques, Exercise, Enjoy a hobby, Keep a positive mindset, Enjoy family, Return to previous social activity and Repeat PHQ-9 every 30 days if score >5  Readiness to change: Preparation:  (Getting ready to change)       OTHER CORE COMPONENTS     Tobacco:   Social History     Tobacco Use   Smoking Status Former   • Packs/day: 0 50   • Years: 5 00   • Pack years: 2 50   • Types: Cigarettes   • Start date:    • Quit date: 26   • Years since quittin 9   Smokeless Tobacco Never       Tobacco Use Intervention: Referral to tobacco expert:   Pt quit 50 years ago   and has abstained    Blood pressure:    Restin/70 - 130/78      Goals: consistent BP < 130/80, moderate intensity exercise >150 mins/wk and medication compliance  Education:  pathophysiology of pulmonary disease, control coughing, inspiratory muscle training and environmental triggers  Progressing:Pt is progressing and showing improvement  toward the following goals:  normal resting BP   , Will continue to educate and progress as tolerated , Goals met: medication compliance    Plan: Avoid Processed foods, engage in regular exercise, eliminate salt shaker at the table, use salt substitutes and check labels for sodium content  Readiness to change: Action:  (Changing behavior)

## 2022-11-18 ENCOUNTER — HOSPITAL ENCOUNTER (OUTPATIENT)
Dept: SLEEP CENTER | Facility: CLINIC | Age: 77
Discharge: HOME | End: 2022-11-18

## 2022-11-18 DIAGNOSIS — G47.33 OSA (OBSTRUCTIVE SLEEP APNEA): ICD-10-CM

## 2022-11-22 ENCOUNTER — CLINICAL SUPPORT (OUTPATIENT)
Dept: PULMONOLOGY | Facility: CLINIC | Age: 77
End: 2022-11-22

## 2022-11-22 DIAGNOSIS — J84.9 ILD (INTERSTITIAL LUNG DISEASE) (HCC): Primary | ICD-10-CM

## 2022-11-28 ENCOUNTER — TELEPHONE (OUTPATIENT)
Dept: SLEEP CENTER | Facility: CLINIC | Age: 77
End: 2022-11-28

## 2022-11-28 DIAGNOSIS — G47.33 OSA (OBSTRUCTIVE SLEEP APNEA): Primary | ICD-10-CM

## 2022-11-28 NOTE — TELEPHONE ENCOUNTER
Patient of Dr Blaise Moreno in the [facility] pulmonary office  Called patient and advised sleep study resulted and shows severe ROSALINA MEAGHAN 66 6 with significant hypoxia  CPAP study ordered  Offered to schedule CPAP study but patient declined  States last time he had the study it was "torture"  He used CPAP for about a month in the past and it did not help him  He has other health issues that are more concerning right now  He would like to discuss further with Dr Blaise Moreno at his 12/23/22 follow up  I advised he reach out to the pulmonary office to move his appointment sooner if he would like

## 2022-11-28 NOTE — PROGRESS NOTES
Home Sleep Study Documentation    HOME STUDY DEVICE: Noxturnal no                                           Magda G3 yes      Pre-Sleep Home Study:    Set-up and instructions performed by: Gregg Cisneros performed demonstration for Patient: yes    Return demonstration performed by Patient: yes    Written instructions provided to Patient: yes    Patient signed consent form: yes        Post-Sleep Home Study:    Additional comments by Patient: None    Home Sleep Study Failed:no:    Failure reason: N/A    Reported or Detected: N/A    Scored by: Durga Santizo

## 2022-11-29 ENCOUNTER — CLINICAL SUPPORT (OUTPATIENT)
Dept: PULMONOLOGY | Facility: CLINIC | Age: 77
End: 2022-11-29

## 2022-11-29 DIAGNOSIS — J84.9 ILD (INTERSTITIAL LUNG DISEASE) (HCC): Primary | ICD-10-CM

## 2022-11-29 DIAGNOSIS — F32.A DEPRESSION, UNSPECIFIED DEPRESSION TYPE: ICD-10-CM

## 2022-12-01 ENCOUNTER — CLINICAL SUPPORT (OUTPATIENT)
Dept: PULMONOLOGY | Facility: CLINIC | Age: 77
End: 2022-12-01

## 2022-12-01 DIAGNOSIS — J84.9 ILD (INTERSTITIAL LUNG DISEASE) (HCC): Primary | ICD-10-CM

## 2022-12-04 ENCOUNTER — TRANSCRIBE ORDERS (OUTPATIENT)
Dept: FAMILY MEDICINE CLINIC | Facility: CLINIC | Age: 77
End: 2022-12-04

## 2022-12-04 DIAGNOSIS — J84.9 ILD (INTERSTITIAL LUNG DISEASE) (HCC): Primary | Chronic | ICD-10-CM

## 2022-12-05 RX ORDER — FLUOXETINE HYDROCHLORIDE 20 MG/1
CAPSULE ORAL
Qty: 90 CAPSULE | Refills: 2 | Status: SHIPPED | OUTPATIENT
Start: 2022-12-05

## 2022-12-06 ENCOUNTER — CLINICAL SUPPORT (OUTPATIENT)
Dept: PULMONOLOGY | Facility: CLINIC | Age: 77
End: 2022-12-06

## 2022-12-06 ENCOUNTER — TELEPHONE (OUTPATIENT)
Dept: CARDIOLOGY CLINIC | Facility: CLINIC | Age: 77
End: 2022-12-06

## 2022-12-06 DIAGNOSIS — J84.9 ILD (INTERSTITIAL LUNG DISEASE) (HCC): Primary | ICD-10-CM

## 2022-12-06 DIAGNOSIS — J84.9 ILD (INTERSTITIAL LUNG DISEASE) (HCC): Primary | Chronic | ICD-10-CM

## 2022-12-07 NOTE — PATIENT INSTRUCTIONS
PRESCRIPTION REFILL REMINDER:  All medication refills should be requested prior to [facility] on Friday  Any refill requests after noon on Friday would be addressed the following Monday  Please protect yourself from [place]   = Wash your hands  Soap and water, or hand  with at least 60% alcohol, are both effective at killing the virus  = Wear a mask  This will help protect others from any virus particles you might spread  Your mouth and nose BOTH need to be covered  = Keep the distance  Keep 6 feet of distance from other people, even if they seem healthy  Keeping distance protects you from the other person's virus spread     = Get a vaccine, and boost it  Three vaccines are approved for use in the [place] for all adults  These vaccines do provide protection against all known variants, and the new 'bi-valent' booster is predicted to be more protective against Omicron variants   + Pfizer is FDA approved for all persons age 11 and older   + Marveen Bunting may be given to all person age 25 and older   - We do NOT recommend [address] vaccine for our Palliative Care patients  - [facility] is no longer offering regular COVID vaccine clinics  You may ask your primary doctor for help and advice  = you may also visit the CDC's complete list of approved sites for new vaccines: Vaccines  gov - Vaccine Location Search Results   (You may also visit Vaccines  gov and search for 'Bi-valent booster' in your zip code )  = [address] ([facility]), [employer], [employer], [employer], and many [facility] locations ALL have shots   + The CDC recommends booster shots on ALL vaccines for ALL adults  = Test to treat  If you have symptoms, get tested, and get treatment!   New drugs like Paxlovid can prevent you from ever having to go to the hospital , and may be covered completely by your insurance or special government programs    - https://aspr hhs gov/TestToTreat/ Informacion en espanol sobre vacunas, de nos companeros de [facility] --  Nini lubin    Check out [employer] for Alexis data that are updated daily:    http://www [facility]/     Global Epidemics  Org, from [facility] (OUTPATIENT CAMPUS), will give you Vxbuku-wj-[id number] information on virus cases and vaccination rates:    Https://globalepidemics  org/    Frequently Asked Questions about COVID, answered by [facility]    [employer] es

## 2022-12-08 ENCOUNTER — CONSULT (OUTPATIENT)
Dept: PALLIATIVE MEDICINE | Facility: CLINIC | Age: 77
End: 2022-12-08

## 2022-12-08 ENCOUNTER — APPOINTMENT (OUTPATIENT)
Dept: PULMONOLOGY | Facility: CLINIC | Age: 77
End: 2022-12-08

## 2022-12-08 VITALS
WEIGHT: 164 LBS | SYSTOLIC BLOOD PRESSURE: 110 MMHG | OXYGEN SATURATION: 94 % | DIASTOLIC BLOOD PRESSURE: 72 MMHG | HEART RATE: 72 BPM | HEIGHT: 77 IN | TEMPERATURE: 97.6 F | BODY MASS INDEX: 19.36 KG/M2 | RESPIRATION RATE: 18 BRPM

## 2022-12-08 DIAGNOSIS — Z71.89 COUNSELING AND COORDINATION OF CARE: Primary | ICD-10-CM

## 2022-12-08 DIAGNOSIS — Z71.89 ADVANCED CARE PLANNING/COUNSELING DISCUSSION: Primary | ICD-10-CM

## 2022-12-08 DIAGNOSIS — J84.9 ILD (INTERSTITIAL LUNG DISEASE) (HCC): ICD-10-CM

## 2022-12-08 DIAGNOSIS — J96.11 CHRONIC HYPOXEMIC RESPIRATORY FAILURE (HCC): ICD-10-CM

## 2022-12-08 DIAGNOSIS — Z71.89 COUNSELING REGARDING ADVANCED CARE PLANNING AND GOALS OF CARE: ICD-10-CM

## 2022-12-08 NOTE — PROGRESS NOTES
Palliative and Supportive Care   Madiha Baez 69 y o  male [id number]    Assessment/Plan:  1  Advanced care planning/counseling discussion    2  ILD (interstitial lung disease) ([address] )    3  Chronic hypoxemic respiratory failure (HCC)    4  Counseling regarding advanced care planning and goals of care      · Not appropriate for opioid therapy at this time (no apparent need for this yet)  He also lives alone which make me worried about safe use of opioid given possible side effects that can lead to fall and fractures  · He has a living will where he names his daughter as his surrogate health care decision maker  He also wants to forego any heroics - CPR and intubation  · He wants to live independently as long as he can  Spent time discussing advanced care planning as noted below  · PCP managing insomnia and poor appetite  · Currently tolerating pulmonary rehab  · Continue PCP, cardio and pulmo follow up   · RTO in 3 months, asked to bring his daughter to discuss his living will and revisit goals of care further    Requested Prescriptions      No prescriptions requested or ordered in this encounter     There are no discontinued medications  Representatives have queried the patient's controlled substance dispensing history in the Prescription Drug Monitoring Program in compliance with regulations before I have prescribed any controlled substances  The prescription history is consistent with prescribed therapy and our practice policies        45 minutes were spent face to face with Madiha Baez with greater than 50% of the time spent in counseling or coordination of care including discussions of etiology of diagnosis, pathogenesis of diagnosis, prognosis of diagnosis, diagnostic results, impression, and recommendations, risks and benefits of treatment, instructions for disease self management, treatment instructions, follow up requirements, risk factors and risk reduction of disease, patient and family counseling/involvement in care and compliance with treatment regimen, advanced care planning  All of the patient's questions were answered during this discussion  No follow-ups on file  Subjective:   Chief Complaint  New consultation for:  goal of care assessment and decisional support, disease process education and discussion of prognosis, advance care planning, emotional support in the setting of serious illness  HPI     Saniya Conde is a 69 y o  male with ILD, pulmonary HTN, and chronic hypoxic respiratory failure who is on baseline 3L O2 at home  Currently on Ofev and Tyvaso  He is referred to palliative care by pulmonology given what appears to be worsening symptoms  We spent time discussing his understanding of his lung condition  He appears to have a good insight into his disease process and displays a good understanding of the progressive nature of his condition  He seems to be struggling with accepting his new reality and baseline, oftentimes talking about how he wishes to remain as independent as much as possible  His judgement may be affected because of this  We spent time discussing the trajectory of organ failure like ILD using the graph below to hopefully set realistic goals moving forward  We discussed energy conservation and delegating small tasks that are not vital (doing laundry, folding clothes, making the bed, etc) to somebody else - whether family, friends, hired help at home  He gets fatigue easily doing similar tasks  We discussed utilizing mechanical assistive device when walking long distances but he is not ready to use a wheelchair yet  He is still embarrassed to be seen wearing oxygen in public  For now, he found a way to navigate around the grocery store - used a cart as support and took his time - that is acceptable to him  Encouraged to reach out should he need a wheelchair or other DMEs       He is advised to continue follow up with PCP, pulmo and cardiology to ensure stability for as long possible from chronic illnesses  We discussed role of palliative at this time  We can offer symptomatic relief from shortness of breath from ILD using opioids to mask the symptoms  At this time, he is not appropriate yet for this (need is not there yet) and his living alone may be a limiting factor for safe use  His daughter offered for him to move to her place to better care for him  While he is not ruling this out, he does not want to do this until he has to  He wants to manage on his own mostly and if he needs to hire extra help at home first, he will do that before he moves in with her  His wife  last year after a short dawn with multiple myeloma  She was on hospice for a few days before passing away  He does have some understanding of hospice and we spent time discussing when hospice is appropriate  He was experiencing poor appetite with weight loss as well as insomnia but since seeing PCP who started him on megace and temazepam, his symptoms improved  Continued management as per PCP  He is referred to pulmonary rehab and tolerating pretty well  He is advised to return in 3 months with his daughter to review his living will  The following portions of the medical history were reviewed: past medical history, problem list, medication list, and social history      Current Outpatient Medications:   •  fexofenadine (ALLEGRA) 180 MG tablet, Take 1 tablet (180 mg total) by mouth daily, Disp: 30 tablet, Rfl: 3  •  finasteride (PROSCAR) 5 mg tablet, TAKE 1 TABLET BY MOUTH EVERY DAY, Disp: 90 tablet, Rfl: 3  •  FLUoxetine (PROzac) 20 mg capsule, TAKE 1 CAPSULE BY MOUTH EVERY DAY, Disp: 90 capsule, Rfl: 2  •  fluticasone (FLONASE) 50 mcg/act nasal spray, SPRAY 2 SPRAYS INTO EACH NOSTRIL EVERY DAY, Disp: 48 mL, Rfl: 1  •  fluticasone (FLONASE) 50 mcg/act nasal spray, 2 sprays into each nostril daily, Disp: 16 g, Rfl: 0  •  guaifenesin-codeine (GUAIFENESIN AC) 100-10 MG/5ML liquid, 1-2 TSP Q6HR PRN COUGH, Disp: 120 mL, Rfl: 5  •  megestrol (MEGACE ES) 625 MG/5ML suspension, Take 5 mL (625 mg total) by mouth daily, Disp: 150 mL, Rfl: 3  •  Multiple Vitamin (MULTI-DAY VITAMINS) TABS, Take 1 tablet by mouth daily, Disp: , Rfl:   •  nintedanib esylate (OFEV) 150 MG CAPS capsule, Take 1 capsule (150 mg total) by mouth 2 (two) times a day, Disp: 60 capsule, Rfl: 11  •  tamsulosin (FLOMAX) 0 4 mg, TAKE 2 CAPSULES (0 8 MG TOTAL) BY MOUTH DAILY WITH DINNER, Disp: 180 capsule, Rfl: 3  •  temazepam (RESTORIL) 30 mg capsule, 1-2 hs  , Disp: 30 capsule, Rfl: 4  •  Treprostinil 64 MCG POWD, Inhale 64 mcg 4 (four) times a day, Disp: 112 each, Rfl: 11  Review of Systems   Constitutional: Positive for activity change, appetite change, fatigue and unexpected weight change  HENT: Negative for trouble swallowing  Respiratory: Positive for shortness of breath  Cardiovascular: Negative for chest pain  Gastrointestinal: Negative for abdominal pain, constipation, diarrhea, nausea and vomiting  Musculoskeletal: Negative for back pain  Neurological: Positive for weakness  Psychiatric/Behavioral: Negative for sleep disturbance  The patient is not nervous/anxious  All other systems reviewed and are negative  All other systems negative    Objective:  Vital Signs  /72 (BP Location: Left arm, Patient Position: Sitting, Cuff Size: Adult)   Pulse 72   Temp 97 6 °F (36 4 °C) (Tympanic)   Resp 18   Ht 6' 5" (1 956 m)   Wt 74 4 kg (164 lb)   SpO2 94% Comment: 2L O2  BMI 19 45 kg/m²    Physical Exam    Constitutional: Appears well-developed and well-nourished  Appears thin, chronically ill looking but not toxic appearing  Well kempt, pleasant, jovial  In no acute physical or emotional distress  Head: Normocephalic and atraumatic  Temporal mm wasting  Eyes: EOM are normal  No ocular discharge  No scleral icterus     Neck: No visible adenopathy or masses  Respiratory: Effort normal  No stridor  No respiratory distress  On 3L NC  Can complete long sentences without shortness of breath  Gastrointestinal: No abdominal distension  Musculoskeletal: No edema  Neurological: Alert, oriented and appropriately conversant  Skin: No diaphoresis, no rashes seen on exposed areas of skin  Pale  Psychiatric: Displays a normal mood and affect   Behavior, judgement and thought content appear normal      Helena Nova MD  Palliative Medicine & Supportive Care  Internal Medicine  Available via [facility] Text  Office: [phone]  Fax: [phone]

## 2022-12-08 NOTE — PROGRESS NOTES
Collin ZAMORA met with patient to introduce the roll of Collin ZAMORA in his care/treatment  Patient were given the opportunity to have his questions/concerns addressed  Collin ZAMORA encouraged patient to reach out to  as needed for support and/or resources as needed  Palliative Outpatient Assessment of Need    MSW completed an assessment of need which was completed with patient in the office or via phone/video conference    Relationship status:     Duration of relationship: The pt was  to his wife for 48 years-She passed away in August    Name of significant other:    Children and Ages: The pt has one adult daughter and 1 granddaughter and 5 grand sons    Pets:none    Other important family information:The pt daughter lives nearby    Living situation (where and whom):The pt resides in a 55 [address] [place] community  The home has a walk out basement but the pt resides on one level     Patient's primary caregiver:  Self, daughter    Any limitations of caregiver: The pt resides alone    Environmental concerns or barriers:none      history:none    Employment history/source of income: The pt is retired and currently has no financial concerns    Disability: none     Spirituality/ Synagogue:      Patient's strengths, social supports, and resources: The pt has the support of his daughter, sister, and very good friends    Cultural information:     Mental Health current or previous:The pt states that he was depressed a few weeks ago but has been prescribed Prozac and is feeling much better    Substance use or history: none    Sleep: The pt sleep has improved    Exercise:Walking slowly    Diet/nutrition:The pt states that he has loss about 20 pounds but his eating has improved a great deal   He states that has become very proficient in making smoothies as well  Durable Medical Equipment needs: The pt uses O2 provided by [facility]   He uses 3 liters in the home and 2L as needed outside the home      Transportation:The pt is able to drive himself to and from appointments    Financial concerns: The pt has no financial concerns at this time    Advanced Directive: The pt states that he has a POA and a Will  He will bring the paperwork in during his next visit    Other medical or social work providers involved:none    Patient/caregiver current level of coping:The pt states that he is doing better and recognized that he needed some assistance 3 weeks ago because he did not feel like himself  Understanding: The pt     Patient/family concerns and areas of need: The pt daughter has offered to have the pt reside with her, but he feels that he would be a burden to his daughter and he states that he would like to remain independent  The pt has someone come to his home on fridays for 3 hours and a cleaning lady comes to the home every three weeks    The pt states that his daughter has been assisting him with managing bill payments and encouraged the pt to use delivery/ for his groceries  The pt states that he is learning how to address his challenges with health  Patient's interests: The pt would like to be able to do more and move around faster, but he will continue to follow cues from his body  He will not push himself beyond his physical limits  The pt also does pulmonary therapy  He states that     I have spent 45 minutes with Patient  today in which greater than 50% of this time was spent in counseling/coordination of care regarding ongoing emotional support  *All questions may not be answered due to constraints    Follow-up discussions may need to occur

## 2022-12-09 ENCOUNTER — CLINICAL SUPPORT (OUTPATIENT)
Dept: PULMONOLOGY | Facility: CLINIC | Age: 77
End: 2022-12-09

## 2022-12-09 DIAGNOSIS — J84.9 ILD (INTERSTITIAL LUNG DISEASE) (HCC): Primary | ICD-10-CM

## 2022-12-13 ENCOUNTER — CLINICAL SUPPORT (OUTPATIENT)
Dept: PULMONOLOGY | Facility: CLINIC | Age: 77
End: 2022-12-13

## 2022-12-13 DIAGNOSIS — J84.9 ILD (INTERSTITIAL LUNG DISEASE) (HCC): Primary | ICD-10-CM

## 2022-12-13 DIAGNOSIS — N40.1 BPH WITH OBSTRUCTION/LOWER URINARY TRACT SYMPTOMS: ICD-10-CM

## 2022-12-13 DIAGNOSIS — N13.8 BPH WITH OBSTRUCTION/LOWER URINARY TRACT SYMPTOMS: ICD-10-CM

## 2022-12-13 RX ORDER — TAMSULOSIN HYDROCHLORIDE 0.4 MG/1
CAPSULE ORAL
Qty: 180 CAPSULE | Refills: 3 | Status: SHIPPED | OUTPATIENT
Start: 2022-12-13

## 2022-12-13 NOTE — PROGRESS NOTES
Pulmonary Rehabilitation Plan of Care   90 Day Reassessment      Today's date: 2022   # of Exercise Sessions Completed: 18  Patient name: Mindi Monroe      : 1945  Age: 69 y o  MRN: [id number]  Referring Physician: Renan Mueller*  Pulmonologist: Verito Kong MD  Provider: [facility]  Clinician: Trevor Osorio MS, CEP    Dx:   Encounter Diagnosis   Name Primary? • ILD (interstitial lung disease) ([facility] ) Yes     Date of onset: 2022      SUMMARY OF PROGRESS:  Nova García is compliant with exericse 2 days/week in cardiac rehab  He tolerates 42 min of exercise with a slight increase of METs 2-2 8  Resting SpO2 91-98% with decreased SpO2 to 88-97% on 3 L supplemental O2 via nasal cannula  Nova García denies dyspnea at rest and c/o 2-3/10 dyspnea with exercise  At home, Nova García has resumed all ADLs he was doing prior to pulmonary rehab but has not added home exercise  I encouraged him to continue at least exercising 2 days/week once he completes his 24 sessions of pulmonary rehab  Resting HR 65-73 bpm with slight increase HR with exercise to 70-90 bpm  Normal resting /70 - 120/70  Nova García is occasionally limited by lightheadedness with activity  Nova García has lost 12 lbs since starting pulmonary rehab and is frustrated that he continues to lose weight  Nova García reports being prescribed a medication to boost his appetite 4 weeks ago as well as Prozac and a different sleeping medication  He has been sleeping better the past few weeks and starting to feel a little better about his situation although he still gets frustrated about not being able to walk 3-4 miles/day like his used to  Shan's exercise intensity will continue to be progressed as tolerated in his final 6 exercise sessions of pulmonary rehab dn supplemental O2  Titrated to maintain SpO2 >90%   Nova García reports monitoring his pulse ox at home and especially when driving he is sure that his SpO2 is > 90% prior to driving and before he gets out of the car to walk places  He reports tolerating continuous flow better than pulse flow        Medication compliance: Yes   Comments: Pt reports to be compliant with medications  Fall Risk: Low   Comments: Ambulates with a steady gait with no assist device and Denies a fall in the past 6 months    Smoking: Former user  Quit 50 years ago    RPD at Rest: 0/10  RPD with Exercise:  2-310    Assessment of progression of lung disease and functional status:  CAT:  initial  Shortness of breath questionnaire: 68/120 initial      EXERCISE ASSESSMENT and PLAN    Current Exercise Program in Rehab:       Frequency:  2 days/week        Minutes: 40-45        METS: 2-2 8              SpO2: 88-97%              RPD: 4-6                      HR: 70-90 bpm   RPE: 4-5         Modalities: UBE, NuStep and Recumbent bike        (Treadmill avoided because of lightheadedness)     Exercise Progression 30 Day Goals :    Frequency: 2 days/week    Supplement with home exercise 1-2 days/wk as tolerated       Minutes: 40-45        METS: 2 5-3 0              SpO2: >90%              RPD: 4-6                      HR: RHR+30bpm   RPE: 4-5        Modalities: Treadmill, UBE, Lifecycle, NuStep and Recumbent bike     Strength trainin-3 days / week  12-15 repetitions  1-2 sets per modality    Modalities: Arm Curl, Sit to Stands, Wall push-ups, Front Raises, Shoulder Shrugs and Calf Raises    Oxygen Needs: supplemental O2 via nasal cannula @ 2L/min at rest and supplemental O2 via nasal cannula @3L/min with exercise  Oxygen Goal: Maintain SpO2>90% during exercise    Home Exercise: none, was walking 2-3 miles daily prior to COVID infection in May  Education: pursed lipped breathing, diaphragmatic breathing, relaxation breathing, home exercise, benefits of exercise for pulmonary disease, RPE scale, RPD scale, O2 saturation monitoring and appropriate O2 response to exercise    Goals: reduced score on  USCD Shortness of Breath Questionnaire, Improved 6MW distance by 10%, reduced dyspnea during exercise (0-3/10), improved exercise tolerance (max METs tolerated in pulmonary rehab), SpO2 >90% during exercise, attend pulmonary rehab regularly, decrease sitting time at home and start a walking program  Progressing:  Pt is progressing and showing improvement  toward the following goals:  attends regularly; increased exercise intensity; added weight training   , Patient will start exercising at community gym in the next 30 days, Will continue to educate and progress as tolerated      Plan: Titrate supplemental oxygen as needed to maintain SpO2>90% with exercise, learn to conserve energy with ADLs , practice breathing techniques 3x/day and reduce time sitting at home    Readiness to change: Action:  (Changing behavior)      NUTRITION ASSESSMENT AND PLAN    Weight control:    Starting weight: 172 6 lb   Current weight: 160 8 lb    Diabetes: N/A    Goals:choose lean meat (93-95%), increase intake of fish, shellfish, use low fat dairy, eat 3 or more servings of whole grains a day, Eat 4-5 cups of fruits and vegetables daily, choose low sodium processed foods, eliminate butter, eliminate or choose low-fat sweets, daily saturated fat intake <7%/13g and weight gain  Education: hydration  Progressing:Pt has not made progress toward the following goals: Pt lost 12 lbs since starting pulmonary rehab  , He plans to add more protein into his diet and has started to have improved appetite recently  Plan: Education class: Reading Food Labels, Education Class: Heart Healthy Eating, replace refined grain bread with whole grain bread, replace unhealthy snacks with fruits & vegs, avoid processed foods, drink more water, learn how to read food labels, replace sugar with stevia or truvia, slow down eating time, decrease carbonated beverages and decrease gas producing foods  Readiness to change: Preparation:  (Getting ready to change)       PSYCHOSOCIAL ASSESSMENT AND PLAN    Emotional:  Depression assessment: PHQ-9 = 11 10-14 = Moderate Depression; not reassessed today            Anxiety measure:  DON-7 = 1 0-4  = Not anxious; not reassessed today  Self-reported stress level: 8   Social support: Patient reports excellent emotional/social support from daughter and neighbors- he has been relying on them a lot after death of his wife 1 month ago    Goals:  Reduce perceived stress to 1-3/10, improved [facility] QOL < 27, PHQ-9 - reduced severity by one level, Feelings in [facility] Score < 3, Physical Fitness in [facility] Score < 3, Daily Activity in [facility] Score < 3, Social Activities in [facility] Score < 3, Pain in [facility] Score < 3, Overall Health in [facility] Score < 3, Quality of Life in [facility] Score < 3 , Change in Health in [place] Score < 3 , Increased interest in doing things, improved sleep, Improved appetite, improved positive thoughts of well being and increased energy  Education: signs/sxs of depression, benefits of a positive support system, stress management techniques, depression and CAD and benefits of mental health counseling    Progressing:Pt is progressing and showing improvement  toward the following goals:  decreased anxiety and depression  , Will continue to educate and progress as tolerated  , no repeat PHQ9 and GAD7 done today  Plan: Class: Stress and Your Health, Class: Relaxation, Refer to [employer], Practice relaxation techniques, Exercise, Enjoy a hobby, Keep a positive mindset, Enjoy family and Return to previous social activity  Readiness to change: Action:  (Changing behavior)      OTHER CORE COMPONENTS     Tobacco:   Social History     Tobacco Use   Smoking Status Former   • Packs/day: 0 50   • Years: 5 00   • Pack years: 2 50   • Types: Cigarettes   • Start date:    • Quit date: 26   • Years since quittin 9   Smokeless Tobacco Never       Tobacco Use Intervention: Referral to tobacco expert:   Pt quit 50 years ago   and has abstained    Blood pressure:    Restin/70 - 120/70      Goals: consistent BP < 130/80, moderate intensity exercise >150 mins/wk and medication compliance  Education:  pathophysiology of pulmonary disease, control coughing, inspiratory muscle training and environmental triggers  Progressing:Pt is progressing and showing improvement  toward the following goals:  normal resting BP   , Will continue to educate and progress as tolerated , Goals met: medication compliance    Plan: Avoid Processed foods, engage in regular exercise, eliminate salt shaker at the table, use salt substitutes and check labels for sodium content  Readiness to change: Action:  (Changing behavior)

## 2022-12-15 ENCOUNTER — APPOINTMENT (OUTPATIENT)
Dept: PULMONOLOGY | Facility: CLINIC | Age: 77
End: 2022-12-15

## 2022-12-20 ENCOUNTER — CLINICAL SUPPORT (OUTPATIENT)
Dept: PULMONOLOGY | Facility: CLINIC | Age: 77
End: 2022-12-20

## 2022-12-20 DIAGNOSIS — J84.9 ILD (INTERSTITIAL LUNG DISEASE) (HCC): Primary | ICD-10-CM

## 2022-12-22 ENCOUNTER — CLINICAL SUPPORT (OUTPATIENT)
Dept: PULMONOLOGY | Facility: CLINIC | Age: 77
End: 2022-12-22

## 2022-12-22 DIAGNOSIS — J84.9 ILD (INTERSTITIAL LUNG DISEASE) (HCC): Primary | ICD-10-CM

## 2022-12-23 ENCOUNTER — OFFICE VISIT (OUTPATIENT)
Dept: PULMONOLOGY | Facility: CLINIC | Age: 77
End: 2022-12-23

## 2022-12-23 VITALS
OXYGEN SATURATION: 97 % | SYSTOLIC BLOOD PRESSURE: 98 MMHG | DIASTOLIC BLOOD PRESSURE: 62 MMHG | BODY MASS INDEX: 19.01 KG/M2 | HEIGHT: 77 IN | TEMPERATURE: 96.7 F | HEART RATE: 84 BPM | WEIGHT: 161 LBS

## 2022-12-23 DIAGNOSIS — F32.A DEPRESSION, UNSPECIFIED DEPRESSION TYPE: ICD-10-CM

## 2022-12-23 DIAGNOSIS — R06.09 DOE (DYSPNEA ON EXERTION): ICD-10-CM

## 2022-12-23 DIAGNOSIS — J96.11 CHRONIC HYPOXEMIC RESPIRATORY FAILURE (HCC): ICD-10-CM

## 2022-12-23 DIAGNOSIS — J84.9 ILD (INTERSTITIAL LUNG DISEASE) (HCC): Primary | ICD-10-CM

## 2022-12-23 DIAGNOSIS — J30.89 NON-SEASONAL ALLERGIC RHINITIS, UNSPECIFIED TRIGGER: ICD-10-CM

## 2022-12-23 NOTE — PROGRESS NOTES
Office Progress Note - Pulmonary    Yola Coraopolis 69 y o  male MRN: [id number]    Encounter: [id number]      Assessment:  • Interstitial lung disease  • Chronic hypoxemic respiratory failure  • Pulmonary hypertension  • Depression  • Allergic rhinitis  Plan:   • Ofev 150 mg twice a day  • Tyvaso  • Prozac 20 mg once a day  • Oxygen supplement 24 hours a day  • Pulmonary rehab  • Fluticasone nasal spray 2 sprays each nostril once a day  • Follow-up in 6 weeks  Discussion:   The patient is overall breathing is better  He is coping with his limitations  He is happier today  I think his depression is better treated  Also his pulmonary hypertension has improved and for this reason his ability to exercise during pulmonary rehab has improved  I have continued him on the Ofev 150 mg twice a day  He will continue with the Tyvaso  I told him to call Dr Mateo Miller office and make an appointment  I have maintained him on the Prozac 20 mg once a day  I told him to start exercising at home and at the gym where he lives  If he feels he is losing rounds we will resume pulmonary hypertension after the first 24 sessions  He will continue the fluticasone nasal spray 2 sprays to each nostril once a day  He already has received influenza vaccine for the season  I will see him in 6 weeks in a follow-up visit  We will consider doing spirometry during the next visit if he continues to improve  Subjective: The patient is here for a follow-up visit  He is happier today  His depression has improved  He is sleeping better at night  His appetite also has improved  He is back in pulmonary rehab and able to exercise at 15-minute sessions for a total of 1 hour on different machines  He is using the oxygen 24 hours a day  He is on the Ofev 150 mg twice a day  No diarrhea  He is also on Tyvaso      Review of systems:  A 12 point system review is done and aside from what is stated above the rest of the review of systems is negative  Family history and social history are reviewed  Medications list is reviewed  Vitals: Blood pressure 98/62, pulse 84, temperature (!) 96 7 °F (35 9 °C), temperature source Tympanic, height 6' 5" (1 956 m), weight 73 kg (161 lb), SpO2 97 %  ,     Physical Exam  Gen: Awake, alert, oriented x 3, no acute distress  HEENT: Mucous membranes moist, no oral lesions, no thrush  NECK: No accessory muscle use, JVP not elevated  Cardiac: Regular, single S1, single S2, no murmurs, no rubs, no gallops  Lungs: Bilateral posterior crackles  Abdomen: normoactive bowel sounds, soft nontender, nondistended, no rebound or rigidity, no guarding  Extremities: no cyanosis, no clubbing, no edema  Neuro:  Grossly nonfocal   Skin:  No rash

## 2022-12-27 ENCOUNTER — APPOINTMENT (OUTPATIENT)
Dept: PULMONOLOGY | Facility: CLINIC | Age: 77
End: 2022-12-27

## 2022-12-27 ENCOUNTER — TELEPHONE (OUTPATIENT)
Dept: CARDIOLOGY CLINIC | Facility: CLINIC | Age: 77
End: 2022-12-27

## 2022-12-28 ENCOUNTER — CLINICAL SUPPORT (OUTPATIENT)
Dept: PULMONOLOGY | Facility: CLINIC | Age: 77
End: 2022-12-28

## 2022-12-28 DIAGNOSIS — J84.9 ILD (INTERSTITIAL LUNG DISEASE) (HCC): Primary | ICD-10-CM

## 2022-12-29 ENCOUNTER — CLINICAL SUPPORT (OUTPATIENT)
Dept: PULMONOLOGY | Facility: CLINIC | Age: 77
End: 2022-12-29

## 2022-12-29 DIAGNOSIS — J84.9 ILD (INTERSTITIAL LUNG DISEASE) (HCC): Primary | ICD-10-CM

## 2023-01-03 ENCOUNTER — TELEPHONE (OUTPATIENT)
Dept: CARDIOLOGY CLINIC | Facility: CLINIC | Age: 78
End: 2023-01-03

## 2023-01-03 ENCOUNTER — CLINICAL SUPPORT (OUTPATIENT)
Dept: PULMONOLOGY | Facility: CLINIC | Age: 78
End: 2023-01-03

## 2023-01-03 DIAGNOSIS — J84.9 ILD (INTERSTITIAL LUNG DISEASE) (HCC): Primary | ICD-10-CM

## 2023-01-04 ENCOUNTER — CLINICAL SUPPORT (OUTPATIENT)
Dept: PULMONOLOGY | Facility: CLINIC | Age: 78
End: 2023-01-04

## 2023-01-04 DIAGNOSIS — J84.9 ILD (INTERSTITIAL LUNG DISEASE) (HCC): Primary | ICD-10-CM

## 2023-01-04 NOTE — PROGRESS NOTES
Pulmonary Rehabilitation Plan of Care   Discharge      Today's date: 2023   # of Exercise Sessions Completed: 24  Patient name: Bernarda Lopez      : 1945  Age: 69 y o  MRN: [id number]  Referring Physician: Lennie Ellis*  Pulmonologist: Joshua Nesbitt MD  Provider: [facility]  Clinician: Luz Elena Henry MS, CEP    Dx:   Encounter Diagnosis   Name Primary? • ILD (interstitial lung disease) ([facility] ) Yes     Date of onset: 2022      SUMMARY OF PROGRESS:  Discharge note for Miguelito Marie  He had improvement in functional capacity, however his 6MWT distance decreased from 1075ft to 975ft  on 3LO2  Miguelito Marie states he felt he could not walk faster during his final test because of fear of lightheadedness  Resting SpO2 93 - 98% with maintained O2 saturation during exercise 90 - 98% supplemental O2 3L/min via nasal canula  Shan occ drops to 89% while walking on the treadmill  He was avoiding the treadmill for some time due to his reports of lightheadedness  RPE 4-5/10 and dyspnea  0-3/10  His max METs in pulmonary rehab increased from 2 3 to 2 8  His [facility] QOL improved by 22 9%  PHQ-9 score decreased from 11 to 0  DON-7 decreased from 1 to 0  CAT score decreased from 23 to 21/40  SOBQ score decreased  from 68 to 65  His weight decreased by 9 pounds  Miguelito Marie had a goal of gaining weight not losing but he states today that he is fine with his current weight, knowing he eats better meals  Miguelito Marie has not been supplementing AR sessions with home exercise but got a stationary bike from his daughter to use at home  He reports increased stamina, strength and reduced SOB with ADLs  Shan tolerates 40 mins at 2 0 - 2 8 METs plus wt training  RHR 63 - 73, ExHR 70 - 93  Resting /74 - 138/70  All group education classes on pulmonary risk factor modification were attended by the patient  Discharge plans include continuing exercise on his own 3-5 days/week at home    Pt was encouraged to remain compliant with medications and f/u with pulmonologist with any pulmonary symptoms and medication management        Medication compliance: Yes   Comments: Pt reports to be compliant with medications  Fall Risk: Low   Comments: Ambulates with a steady gait with no assist device and Denies a fall in the past 6 months    Smoking: Former user  Quit 50 years ago    RPD at Rest: 0/10  RPD with Exercise:  0-3/10    Assessment of progression of lung disease and functional status:  CAT:  initial;  discharge  Shortness of breath questionnaire: 68/120 initial; 65/120 discharge      EXERCISE ASSESSMENT and PLAN    Current Exercise Program in Rehab:       Frequency:  2 days/week        Minutes: 42       METS: 2-2 8              SpO2: 89-98%              RPD: 4-6                      HR: 70-93 bpm   RPE: 4-5         Modalities: Treadmill, UBE, NuStep and Recumbent bike (Treadmill was occ avoided because of lightheadedness)     Exercise Progression 30 Day Goals :    Frequency: 2 days/week    Supplement with home exercise 1-2 days/wk as tolerated       Minutes: 40-45        METS: 2 5-3 0              SpO2: >90%              RPD: 4-6                      HR: RHR+30bpm   RPE: 4-5        Modalities: Treadmill, UBE, Lifecycle, NuStep and Recumbent bike     Strength trainin-3 days / week  12-15 repetitions  1-2 sets per modality    Modalities: Arm Curl, Sit to Stands, Wall push-ups, Front Raises and Calf Raises    Oxygen Needs: supplemental O2 via nasal cannula @ 2L/min at rest and supplemental O2 via nasal cannula @3L/min with exercise  Oxygen Goal: Maintain SpO2>90% during exercise    Home Exercise: none  Education: pursed lipped breathing, diaphragmatic breathing, relaxation breathing, home exercise, benefits of exercise for pulmonary disease, RPE scale, RPD scale, O2 saturation monitoring, appropriate O2 response to exercise and education class: Exercise For The Pulmonary Patient    Goals: reduced score on  USCD Shortness of Breath Questionnaire, Improved 6MW distance by 10%, reduced dyspnea during exercise (0-3/10), improved exercise tolerance (max METs tolerated in pulmonary rehab), SpO2 >90% during exercise, attend pulmonary rehab regularly, decrease sitting time at home and start a walking program  Progressing:  Goals met: increased max METs tolerated in rehab; completed pulmonary rehab program; has stationary bike at home to continue exercise on his own , Patient will be encouraged to focus on lifestyle modifications following discharge  Plan: Titrate supplemental oxygen as needed to maintain SpO2>90% with exercise, learn to conserve energy with ADLs , practice breathing techniques 3x/day and reduce time sitting at home    Readiness to change: Maintenance: (Maintaining the behavior change)      NUTRITION ASSESSMENT AND PLAN    Weight control:    Starting weight: 172 6 lb   Current weight: 163 8 lb    Diabetes: N/A    Goals:choose lean meat (93-95%), increase intake of fish, shellfish, use low fat dairy, eat 3 or more servings of whole grains a day, Eat 4-5 cups of fruits and vegetables daily, choose low sodium processed foods, eliminate butter, eliminate or choose low-fat sweets, daily saturated fat intake <7%/13g and weight gain  Education: hydration  Progressing:Goals not met: was not able to gain weight- lost 9lbs   , Goals met: reports making healthier choices when eating , Patient will be encouraged to focus on lifestyle modifications following discharge    Plan: replace refined grain bread with whole grain bread, replace unhealthy snacks with fruits & vegs, avoid processed foods, drink more water, learn how to read food labels, replace sugar with stevia or truvia, slow down eating time, decrease carbonated beverages and decrease gas producing foods  Readiness to change: Maintenance: (Maintaining the behavior change)      PSYCHOSOCIAL ASSESSMENT AND PLAN    Emotional:  Depression assessment:  PHQ-9 = 0 0 =No Depression Anxiety measure:  DON-7 = 0 0-4  = Not anxious  Self-reported stress level: 3   Social support: Patient reports excellent emotional/social support from daughter and neighbors- he has been relying on them a lot after death of his wife 1 month ago    Goals:  Reduce perceived stress to 1-3/10, improved [facility] QOL < 27, PHQ-9 - reduced severity by one level, Feelings in [facility] Score < 3, Physical Fitness in [facility] Score < 3, Daily Activity in [facility] Score < 3, Social Activities in [facility] Score < 3, Pain in [facility] Score < 3, Overall Health in [facility] Score < 3, Quality of Life in [facility] Score < 3 , Change in Health in [place] Score < 3 , Increased interest in doing things, improved sleep, Improved appetite, improved positive thoughts of well being and increased energy  Education: signs/sxs of depression, benefits of a positive support system, stress management techniques, depression and CAD and benefits of mental health counseling    Progressing:Goals met: significant improvement in PHQ9 score; reports overall feeling in better spirits  , Patient will be encouraged to focus on lifestyle modifications following discharge    Plan: Practice relaxation techniques, Exercise, Enjoy a hobby, Keep a positive mindset, Enjoy family and Return to previous social activity  Readiness to change: Maintenance: (Maintaining the behavior change)      OTHER CORE COMPONENTS     Tobacco:   Social History     Tobacco Use   Smoking Status Former   • Packs/day: 0 50   • Years: 5 00   • Pack years: 2 50   • Types: Cigarettes   • Start date:    • Quit date: 26   • Years since quittin 0   Smokeless Tobacco Never       Tobacco Use Intervention: Referral to tobacco expert:   Pt quit 50 years ago   and has abstained    Blood pressure:    Restin//70    Goals: consistent BP < 130/80, moderate intensity exercise >150 mins/wk and medication compliance  Education:  pathophysiology of pulmonary disease, control coughing, inspiratory muscle training and environmental triggers  Progressing:Goals met: medication compliance; normal resting BP; has bike at home to exercise on own , Patient will be encouraged to focus on lifestyle modifications following discharge    Plan: Avoid Processed foods, engage in regular exercise, eliminate salt shaker at the table, use salt substitutes and check labels for sodium content  Readiness to change: Maintenance: (Maintaining the behavior change)

## 2023-01-04 NOTE — TELEPHONE ENCOUNTER
He states he no longer qualifies for patient assistance due to income  His wife  and he got entitlement/life insurance and his income is too much  His patient assistance program got in touch with him

## 2023-01-05 ENCOUNTER — TELEPHONE (OUTPATIENT)
Dept: CARDIOLOGY CLINIC | Facility: CLINIC | Age: 78
End: 2023-01-05

## 2023-01-05 ENCOUNTER — APPOINTMENT (OUTPATIENT)
Dept: PULMONOLOGY | Facility: CLINIC | Age: 78
End: 2023-01-05

## 2023-01-05 NOTE — TELEPHONE ENCOUNTER
I spoke with Isaiah Munoz ,there are 5 programs we  are going to try and get assist for him  I reminded, Isaiah Munoz he needs to ask for me show

## 2023-01-10 ENCOUNTER — APPOINTMENT (OUTPATIENT)
Dept: PULMONOLOGY | Facility: CLINIC | Age: 78
End: 2023-01-10

## 2023-01-11 NOTE — TELEPHONE ENCOUNTER
Pt called, requesting a call back from office at best [facility] to get a status regarding the medication  Since pt does not qualify for pt assistance, pt would like to know of other alternative since he is out of his medication   Please assist

## 2023-01-12 ENCOUNTER — APPOINTMENT (OUTPATIENT)
Dept: PULMONOLOGY | Facility: CLINIC | Age: 78
End: 2023-01-12

## 2023-01-18 ENCOUNTER — TELEPHONE (OUTPATIENT)
Dept: CARDIOLOGY CLINIC | Facility: CLINIC | Age: 78
End: 2023-01-18

## 2023-01-18 DIAGNOSIS — J30.89 NON-SEASONAL ALLERGIC RHINITIS, UNSPECIFIED TRIGGER: ICD-10-CM

## 2023-01-18 RX ORDER — FLUTICASONE PROPIONATE 50 MCG
2 SPRAY, SUSPENSION (ML) NASAL DAILY
Qty: 48 ML | Refills: 1 | Status: SHIPPED | OUTPATIENT
Start: 2023-01-18

## 2023-01-18 NOTE — TELEPHONE ENCOUNTER
Patient  will call [address] program for assistance program-[employer]      I placed a c/b for Blanca Chan with [employer]( rep) to call back

## 2023-01-18 NOTE — TELEPHONE ENCOUNTER
Hi Rainer Nichols keeps calling the [facility] office regarding getting another prescription in place of his Treprositnil  His wife  and he received endowment/life insurance which put him in another income bracket and he was told he no longer qualifies for thepatient assistance program he was on  I have tried to contact Dr Davon Rosenberg several times with no luck  I then gave patient phone number to call Dr Tracie Donahue MA to get a message to him but she [facility]  I see this note in Epic that he may qualify for several assistance programs and to ask for you  However, he keeps calling me, and again Dr Davon Rosenberg is not answering me  Do you know anything about this?   The patient is very frustrated  He has been out of the medication  Thanks!

## 2023-01-18 NOTE — TELEPHONE ENCOUNTER
I spoke with the Rep-& unfortunately DPI has no other funding except for what we already know about-  I will check & see if the Inhaler is the same,if this is ok with you  But no funding for this either      His deductible is $7000,once this is paid off he probably will have a 0 co-pay

## 2023-01-24 ENCOUNTER — TELEPHONE (OUTPATIENT)
Dept: CARDIOLOGY CLINIC | Facility: CLINIC | Age: 78
End: 2023-01-24

## 2023-01-24 NOTE — TELEPHONE ENCOUNTER
I spoke with Ashlyn Frye -they will not give him assistance on Tyvaso -    I also  spoke with the Rep about the problem and didn't get anywhere       He has a 7000 dollar deductible he has to pay out

## 2023-02-01 ENCOUNTER — OFFICE VISIT (OUTPATIENT)
Dept: PULMONOLOGY | Facility: CLINIC | Age: 78
End: 2023-02-01

## 2023-02-01 VITALS
DIASTOLIC BLOOD PRESSURE: 64 MMHG | WEIGHT: 164 LBS | HEIGHT: 77 IN | SYSTOLIC BLOOD PRESSURE: 108 MMHG | BODY MASS INDEX: 19.36 KG/M2 | OXYGEN SATURATION: 90 % | HEART RATE: 86 BPM | TEMPERATURE: 97.5 F

## 2023-02-01 DIAGNOSIS — F51.01 PRIMARY INSOMNIA: Chronic | ICD-10-CM

## 2023-02-01 DIAGNOSIS — I27.20 PULMONARY HYPERTENSION (HCC): ICD-10-CM

## 2023-02-01 DIAGNOSIS — G47.33 OSA (OBSTRUCTIVE SLEEP APNEA): ICD-10-CM

## 2023-02-01 DIAGNOSIS — J84.9 ILD (INTERSTITIAL LUNG DISEASE) (HCC): Primary | Chronic | ICD-10-CM

## 2023-02-01 NOTE — PROGRESS NOTES
Assessment/Plan:    Primary insomnia  He is doing very well with 30 mg of temazepam nightly  Denies any daytime naps since getting his mood and sleep under better control  Continue Prozac 20 mg daily and temazepam 30 mg daily  ILD (interstitial lung disease) ([facility] [address] )  Currently tolerating 3 L O2 nasal cannula even while exercising  Does not require at night and denies any morning time headaches or symptoms that may be related to desaturating overnight  He is encouraged to  continue exercise at home  He is willing to consider restarting pulmonary rehab again as needed  The pulmonary fibrosis support group was discussed, which patient is interested in  Continue on 3L oxygen supplementation via NC  Continue on treprostinil QID and OFEV twice daily and report any side effects if should arise  We will follow up in 3 months  Diagnoses and all orders for this visit:    ILD (interstitial lung disease) ([facility] [address] )    ROSALINA (obstructive sleep apnea)    Pulmonary hypertension (HCC)    Primary insomnia          Subjective:      Patient ID: Mitchell English is a 69 y o  male  HPI  Mr Merlinda Horn is a 69year old male with history of interstitial lung disease, ROSALINA and pulmonary hypertension, who presents today for follow-up  He was last seen back in December, at which time he was undergoing pulmonary rehab  He has completed 24 sessions and reports this was beneficial for him  Today, he denies any new symptoms  He has his regular morning cough with brownish tinged, not increased from baseline  Has been using 3 L O2 NC with and without exertion  He reports has been exercising on his recumbent bike for 50 minutes a day  He reports marked improvement in his mood since starting Prozac and has been getting very good sleep, no daytime naps  He reports much improved appetite  He is compliant with his medications: Treprostinil, Ofev and Flonase, without side effects      POCT spirometry 9/22/2022:   Restrictive lung disease with the FVC of 2 9  CPAP study diagnostic 10/6/2022:  AHI of 9 3, predominantly central apneas  CT chest without contrast 10/13/2022:  Reticulation, traction bronchiectasis,  nodular consolidation, nodular septal line thickening and honeycombing with progression in nodular septal line thickening and thickening along the fissures  Based on current literature consensus, the CT lung findings represent typical UIP (usual interstitial pneumonia) pattern  Echocardiogram 9/17/2022:  LVEF 50%, normal wall motion, grade 1 diastolic dysfunction, The right ventricular systolic pressure is severely elevated  The estimated right ventricular systolic pressure is 96 06 mmHg  The following portions of the patient's history were reviewed and updated as appropriate:   He  has a past medical history of Bifascicular block (3/8/2020), BPH with obstruction/lower urinary tract symptoms, BPH with urinary obstruction, 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, and Erectile dysfunction  He   Patient Active Problem List    Diagnosis Date Noted   • Counseling regarding advanced care planning and goals of care 12/08/2022   • Primary insomnia 10/31/2022   • Abnormal weight loss 10/31/2022   • Pulmonary hypertension ([facility] [address] ) 10/18/2022   • ILD (interstitial lung disease) ([facility] [address] ) 10/07/2022   • ROSALINA (obstructive sleep apnea)    • COVID-19 07/07/2022   • Dermatitis 02/18/2022   • Dry nose 12/27/2021   • Shoulder pain 06/03/2020   • Left anterior hemiblock 03/08/2020   • Medicare annual wellness visit, initial 10/02/2019   • Cough 07/03/2019   • 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   • Other hemorrhoids 03/05/2019   • BPH with obstruction/lower urinary tract symptoms 06/13/2018   • Combined arterial insufficiency and corporo-venous occlusive erectile dysfunction 06/13/2018     He  has a past surgical history that includes Colonoscopy; Skin biopsy; and Cardiac catheterization (N/A, 10/18/2022)    His Family history is unknown by patient  He  reports that he quit smoking about 52 years ago  His smoking use included cigarettes  He started smoking about 57 years ago  He has a 2 50 pack-year smoking history  He has never used smokeless tobacco  He reports that he does not currently use alcohol after a past usage of about 12 0 standard drinks per week  He reports that he does not use drugs  Current Outpatient Medications   Medication Sig Dispense Refill   • FLUoxetine (PROzac) 20 mg capsule TAKE 1 CAPSULE BY MOUTH EVERY DAY 90 capsule 2   • fluticasone (FLONASE) 50 mcg/act nasal spray 2 sprays into each nostril daily 48 mL 1   • guaifenesin-codeine (GUAIFENESIN AC) 100-10 MG/5ML liquid 1-2 TSP Q6HR PRN COUGH 120 mL 5   • megestrol (MEGACE ES) 625 MG/5ML suspension Take 5 mL (625 mg total) by mouth daily 150 mL 3   • Multiple Vitamin (MULTI-DAY VITAMINS) TABS Take 1 tablet by mouth daily     • nintedanib esylate (OFEV) 150 MG CAPS capsule Take 1 capsule (150 mg total) by mouth 2 (two) times a day 60 capsule 11   • tamsulosin (FLOMAX) 0 4 mg TAKE 2 CAPSULES BY MOUTH DAILY WITH DINNER    180 capsule 3   • temazepam (RESTORIL) 30 mg capsule 1-2 hs   30 capsule 4   • Treprostinil 64 MCG POWD Inhale 64 mcg 4 (four) times a day 112 each 11   • fexofenadine (ALLEGRA) 180 MG tablet Take 1 tablet (180 mg total) by mouth daily (Patient not taking: Reported on 12/23/2022) 30 tablet 3   • finasteride (PROSCAR) 5 mg tablet TAKE 1 TABLET BY MOUTH EVERY DAY (Patient not taking: Reported on 2/1/2023) 90 tablet 3     No current facility-administered medications for this visit  He has No Known Allergies       Review of Systems   Constitutional: Negative for chills and fever  HENT: Negative for postnasal drip and sore throat  Respiratory: Positive for cough (baseline)  Negative for chest tightness, shortness of breath and wheezing  Cardiovascular: Negative for chest pain, palpitations and leg swelling     Gastrointestinal: Negative for abdominal pain, constipation and diarrhea  Neurological: Negative for dizziness  Psychiatric/Behavioral: Negative for sleep disturbance  Objective:    /64 (BP Location: Left arm, Patient Position: Sitting, Cuff Size: Standard)   Pulse 86   Temp 97 5 °F (36 4 °C) (Tympanic)   Ht 6' 5" (1 956 m)   Wt 74 4 kg (164 lb)   SpO2 90% Comment: O2  3L  BMI 19 45 kg/m²      Physical Exam  Vitals reviewed  Constitutional:       General: He is not in acute distress  HENT:      Nose: No congestion  Comments: Nasal cannula in place  Mouth/Throat:      Mouth: Mucous membranes are moist       Pharynx: No oropharyngeal exudate or posterior oropharyngeal erythema  Cardiovascular:      Rate and Rhythm: Normal rate and regular rhythm  Pulses: Normal pulses  Heart sounds: Normal heart sounds  No murmur heard  Pulmonary:      Effort: Pulmonary effort is normal  No respiratory distress  Breath sounds: No wheezing  Comments: Bilateral fine crackles in upper and middle lobes  Musculoskeletal:         General: No tenderness  Cervical back: Neck supple  Right lower leg: No edema  Left lower leg: No edema  Lymphadenopathy:      Cervical: No cervical adenopathy  Skin:     Findings: No rash     Psychiatric:         Mood and Affect: Mood normal          Emmy Prieto  02/01/23

## 2023-02-01 NOTE — ASSESSMENT & PLAN NOTE
He is doing very well with 30 mg of temazepam nightly  Denies any daytime naps since getting his mood and sleep under better control  Continue Prozac 20 mg daily and temazepam 30 mg daily

## 2023-02-01 NOTE — ASSESSMENT & PLAN NOTE
Currently tolerating 3 L O2 nasal cannula even while exercising  Does not require at night and denies any morning time headaches or symptoms that may be related to desaturating overnight  He is encouraged to  continue exercise at home  He is willing to consider restarting pulmonary rehab again as needed  The pulmonary fibrosis support group was discussed, which patient is interested in  Continue on 3L oxygen supplementation via NC  Continue on treprostinil QID and OFEV twice daily and report any side effects if should arise  We will follow up in 3 months

## 2023-02-02 ENCOUNTER — APPOINTMENT (OUTPATIENT)
Dept: LAB | Facility: MEDICAL CENTER | Age: 78
End: 2023-02-02

## 2023-02-02 DIAGNOSIS — Z12.5 PROSTATE CANCER SCREENING: ICD-10-CM

## 2023-02-02 LAB — PSA SERPL-MCNC: 2.5 NG/ML (ref 0–4)

## 2023-02-08 NOTE — PROGRESS NOTES
Outpatient Cardiology Note - Lucy Morales 69 y o  male MRN: [id number]    @ Encounter: [id number]        Patient Active Problem List    Diagnosis Date Noted   • Pulmonary hypertension ([facility] [address] ) 10/18/2022   • Counseling regarding advanced care planning and goals of care 12/08/2022   • Primary insomnia 10/31/2022   • Abnormal weight loss 10/31/2022   • ILD (interstitial lung disease) ([facility] [address] ) 10/07/2022   • ROSALINA (obstructive sleep apnea)    • COVID-19 07/07/2022   • Dermatitis 02/18/2022   • Dry nose 12/27/2021   • Shoulder pain 06/03/2020   • Left anterior hemiblock 03/08/2020   • Medicare annual wellness visit, initial 10/02/2019   • Cough 07/03/2019   • 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   • Other hemorrhoids 03/05/2019   • BPH with obstruction/lower urinary tract symptoms 06/13/2018   • Combined arterial insufficiency and corporo-venous occlusive erectile dysfunction 06/13/2018       Assessment:  # Pulmonary arterial HTN  Impression: ILD- PH with predominance of PAH over ILD; precapillary PAH  PAH Rx: Tyvaso DPI  Oxygen: 2 liters  Diuretic:  Weight: 169 lbs  NT proBNP: 6/30/22: 187    Functional assessment:  6 min walk 8/18/22: 135 meters, pulse ox dropped to 87%  6 min walk 7/13/22: 308 meters, pulse ox dropped to 90%  6 min walk 2/24/22: 476 meters    Spirometry/ PFTs:  Spirometry 9/22/22: moderately to severely restrictive  PFTs 3/16/22: mild restrictive, DLCO 29%    Studies- personally reviewed by [facility] 10/18/22:  SaO2: 93%  HR: 60  RA: 1 mmHg  PA: 53/18/30 mmHg  PCWP: 3  PA sat 65%  Shaheen Co/CI: 4 7/2 4 L/min  PVR: 5 7 HOANG    Echo 9/17/22  LVEF: 50%  RV: mildly dilated- RVID 4 cm  PASP: 72 mmHg  RVOT: no notching  Aorta 4 2 cm  Dilated IVC    HRCT 3/11/22: biapical pleural parenchymal thickening   Main PA 3 8 cm    # ILD- pulmonary fibrosis  Dr Kush Vega  # 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  # COVID, May    Today's Plan:  Tyvaso per INCREASE trial- may have to change to sildanefil due to copay  ILD per  Jeff  6 min walk  NT proBNP      HPI:      68 yo former smoker diagnosed with ILD referred for evaluation of pulmonary hypertension  He also has untreated ROSALINA and has been experiencing worsening MCGILL  His echo has shown indirect PA pressures at 72 mmHg with dilated IVC, CT chest showing pulmonary fibrosis with enlargement of main PA of 3 8 cm  Had Farhad in May, hit him hard  Lost his wife this year to multiple myeloma  He had a tough year  No hx of spinal stenosis or carpal tunnel    Interim:  RHC 10/18/22:  SaO2: 93%  HR: 60  RA: 1 mmHg  PA: 53/18/30 mmHg  PCWP: 3  PA sat 65%  Shaheen Co/CI: 4 7/2 4 L/min  PVR: 5 7 HOANG    Started on Tyvaso DPI  Very high deductible $7000- now down to $1100    Past Medical History:   Diagnosis Date   • Bifascicular block 3/8/2020    Asx: discovered on Ekg done 3/20 in eval of chest pain  • BPH with obstruction/lower urinary tract symptoms    • BPH with urinary obstruction    • 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    • Erectile dysfunction        Review of Systems   Constitutional: Negative for activity change, appetite change, fatigue and unexpected weight change  HENT: Negative for congestion and nosebleeds  Eyes: Negative  Respiratory: Positive for shortness of breath  Negative for cough and chest tightness  Cardiovascular: Negative for chest pain, palpitations and leg swelling  Gastrointestinal: Negative for abdominal distention  Endocrine: Negative  Genitourinary: Negative  Musculoskeletal: Negative  Skin: Negative  Neurological: Negative for dizziness, syncope and weakness  Hematological: Negative  Psychiatric/Behavioral: Negative  No Known Allergies        Current Outpatient Medications:   •  FLUoxetine (PROzac) 20 mg capsule, TAKE 1 CAPSULE BY MOUTH EVERY DAY, Disp: 90 capsule, Rfl: 2  •  fluticasone (FLONASE) 50 mcg/act nasal spray, 2 sprays into each nostril daily, Disp: 48 mL, Rfl: 1  •  guaifenesin-codeine (GUAIFENESIN AC) 100-10 MG/5ML liquid, 1-2 TSP Q6HR PRN COUGH, Disp: 120 mL, Rfl: 5  •  megestrol (MEGACE ES) 625 MG/5ML suspension, Take 5 mL (625 mg total) by mouth daily, Disp: 150 mL, Rfl: 3  •  Multiple Vitamin (MULTI-DAY VITAMINS) TABS, Take 1 tablet by mouth daily, Disp: , Rfl:   •  nintedanib esylate (OFEV) 150 MG CAPS capsule, Take 1 capsule (150 mg total) by mouth 2 (two) times a day, Disp: 60 capsule, Rfl: 11  •  tamsulosin (FLOMAX) 0 4 mg, TAKE 2 CAPSULES BY MOUTH DAILY WITH DINNER   , Disp: 180 capsule, Rfl: 3  •  temazepam (RESTORIL) 30 mg capsule, 1-2 hs  , Disp: 30 capsule, Rfl: 4  •  Treprostinil 64 MCG POWD, Inhale 64 mcg 4 (four) times a day, Disp: 112 each, Rfl: 11  •  fexofenadine (ALLEGRA) 180 MG tablet, Take 1 tablet (180 mg total) by mouth daily (Patient not taking: Reported on 2022), Disp: 30 tablet, Rfl: 3  •  finasteride (PROSCAR) 5 mg tablet, TAKE 1 TABLET BY MOUTH EVERY DAY (Patient not taking: Reported on 2023), Disp: 90 tablet, Rfl: 3    Social History     Socioeconomic History   • Marital status: /Civil Union     Spouse name: Not on file   • Number of children: Not on file   • Years of education: Not on file   • Highest education level: Not on file   Occupational History   • Not on file   Tobacco Use   • Smoking status: Former     Packs/day: 0 50     Years: 5 00     Pack years: 2 50     Types: Cigarettes     Start date: 1     Quit date:      Years since quittin 1   • Smokeless tobacco: Never   Vaping Use   • Vaping Use: Never used   Substance and Sexual Activity   • Alcohol use: Not Currently     Alcohol/week: 12 0 standard drinks     Types: 12 Cans of beer per week     Comment: socially   • Drug use: No   • Sexual activity: Not Currently   Other Topics Concern   • Not on file   Social History Narrative   • Not on file     Social Determinants of Health     Financial Resource Strain: Not on file   Food Insecurity: Not on file   Transportation Needs: Not on file   Physical Activity: Not on file   Stress: Not on file   Social Connections: Not on file   Intimate Partner Violence: Not on file   Housing Stability: Not on file       Family History   Family history unknown: Yes       Physical Exam:    Vitals: Blood pressure 122/60, pulse 70, height 6' 5" (1 956 m), weight 75 1 kg (165 lb 9 6 oz)  , Body mass index is 19 64 kg/m² ,   Wt Readings from Last 3 Encounters:   02/09/23 75 1 kg (165 lb 9 6 oz)   02/01/23 74 4 kg (164 lb)   12/23/22 73 kg (161 lb)       Physical Exam:  Vitals:    02/09/23 1439   BP: 122/60   BP Location: Right arm   Patient Position: Sitting   Cuff Size: Adult   Pulse: 70   Weight: 75 1 kg (165 lb 9 6 oz)   Height: 6' 5" (1 956 m)       Physical Exam  Constitutional:       Appearance: He is well-developed  HENT:      Head: Normocephalic and atraumatic  Eyes:      Pupils: Pupils are equal, round, and reactive to light  Neck:      Vascular: No JVD  Cardiovascular:      Rate and Rhythm: Normal rate and regular rhythm  Heart sounds: No murmur heard  Pulmonary:      Effort: Pulmonary effort is normal  No respiratory distress  Breath sounds: Normal breath sounds  Abdominal:      General: There is no distension  Palpations: Abdomen is soft  Tenderness: There is no abdominal tenderness  Musculoskeletal:         General: Normal range of motion  Cervical back: Normal range of motion  Skin:     General: Skin is warm and dry  Findings: No rash  Neurological:      Mental Status: He is alert and oriented to person, place, and time         Labs & Results:    Lab Results   Component Value Date    WBC 8 64 09/29/2022    HGB 13 5 09/29/2022    HCT 42 2 09/29/2022     (H) 09/29/2022     09/29/2022     Lab Results   Component Value Date    SODIUM 136 10/18/2022    K 3 9 10/18/2022     10/18/2022    CO2 25 10/18/2022    BUN 19 10/18/2022    CREATININE 0 81 10/18/2022    GLUC 104 10/18/2022    CALCIUM 9 4 10/18/2022     No results found for: BNP   Lab Results   Component Value Date    CHOLESTEROL 136 12/03/2021     Lab Results   Component Value Date    HDL 68 12/03/2021     Lab Results   Component Value Date    TRIG 43 12/03/2021     Lab Results   Component Value Date    [place] 68 12/03/2021     EKG personally reviewed by Fred Justin  Counseling / Coordination of Care  Time spent today 25 minutes  Greater than 50% of total time was spent with the patient and / or family counseling and / or coordination of care  We discussed diagnoses, most recent studies and any changes in treatment  Thank you for the opportunity to participate in the care of this patient      [facility] PULMONARY [facility]  MEDICAL DIRECTOR OF [place]

## 2023-02-09 ENCOUNTER — TELEPHONE (OUTPATIENT)
Dept: PULMONOLOGY | Facility: CLINIC | Age: 78
End: 2023-02-09

## 2023-02-09 ENCOUNTER — OFFICE VISIT (OUTPATIENT)
Dept: CARDIOLOGY CLINIC | Facility: CLINIC | Age: 78
End: 2023-02-09

## 2023-02-09 VITALS
DIASTOLIC BLOOD PRESSURE: 60 MMHG | WEIGHT: 165.6 LBS | BODY MASS INDEX: 19.55 KG/M2 | SYSTOLIC BLOOD PRESSURE: 122 MMHG | HEIGHT: 77 IN | HEART RATE: 70 BPM

## 2023-02-09 DIAGNOSIS — I27.21 PAH (PULMONARY ARTERY HYPERTENSION) (HCC): Primary | ICD-10-CM

## 2023-02-09 DIAGNOSIS — J84.9 ILD (INTERSTITIAL LUNG DISEASE) (HCC): Chronic | ICD-10-CM

## 2023-02-12 DIAGNOSIS — N40.1 BPH WITH OBSTRUCTION/LOWER URINARY TRACT SYMPTOMS: ICD-10-CM

## 2023-02-12 DIAGNOSIS — N13.8 BPH WITH OBSTRUCTION/LOWER URINARY TRACT SYMPTOMS: ICD-10-CM

## 2023-02-13 RX ORDER — FINASTERIDE 5 MG/1
TABLET, FILM COATED ORAL
Qty: 90 TABLET | Refills: 3 | Status: SHIPPED | OUTPATIENT
Start: 2023-02-13

## 2023-02-24 ENCOUNTER — TELEPHONE (OUTPATIENT)
Dept: CARDIOLOGY CLINIC | Facility: CLINIC | Age: 78
End: 2023-02-24

## 2023-02-24 NOTE — TELEPHONE ENCOUNTER
Pt is calling at last OV he states that his Treprostmil dosing was to be increased but no Rx was sent for higher doseage  Pt is asking if you want to increase doseage a new script needs to be sent to the [address] [place] [address]  Please advise  I will call patient with the response  Thank you

## 2023-02-25 ENCOUNTER — DOCUMENTATION (OUTPATIENT)
Dept: FAMILY MEDICINE CLINIC | Facility: CLINIC | Age: 78
End: 2023-02-25

## 2023-02-25 DIAGNOSIS — J84.9 ILD (INTERSTITIAL LUNG DISEASE) (HCC): Primary | ICD-10-CM

## 2023-02-26 ENCOUNTER — APPOINTMENT (OUTPATIENT)
Dept: LAB | Facility: HOSPITAL | Age: 78
End: 2023-02-26

## 2023-02-26 DIAGNOSIS — I27.21 PAH (PULMONARY ARTERY HYPERTENSION) (HCC): ICD-10-CM

## 2023-02-26 DIAGNOSIS — J84.9 ILD (INTERSTITIAL LUNG DISEASE) (HCC): Chronic | ICD-10-CM

## 2023-02-26 LAB
ALBUMIN SERPL BCP-MCNC: 2.4 G/DL (ref 3.5–5)
ALP SERPL-CCNC: 71 U/L (ref 46–116)
ALT SERPL W P-5'-P-CCNC: 17 U/L (ref 12–78)
ANION GAP SERPL CALCULATED.3IONS-SCNC: 4 MMOL/L (ref 4–13)
AST SERPL W P-5'-P-CCNC: 21 U/L (ref 5–45)
BASOPHILS # BLD AUTO: 0.03 THOUSANDS/ÂΜL (ref 0–0.1)
BASOPHILS NFR BLD AUTO: 0 % (ref 0–1)
BILIRUB SERPL-MCNC: 0.58 MG/DL (ref 0.2–1)
BNP SERPL-MCNC: 129 PG/ML (ref 0–100)
BUN SERPL-MCNC: 15 MG/DL (ref 5–25)
CALCIUM ALBUM COR SERPL-MCNC: 10.4 MG/DL (ref 8.3–10.1)
CALCIUM SERPL-MCNC: 9.1 MG/DL (ref 8.3–10.1)
CHLORIDE SERPL-SCNC: 102 MMOL/L (ref 96–108)
CO2 SERPL-SCNC: 31 MMOL/L (ref 21–32)
CREAT SERPL-MCNC: 0.72 MG/DL (ref 0.6–1.3)
EOSINOPHIL # BLD AUTO: 0.14 THOUSAND/ÂΜL (ref 0–0.61)
EOSINOPHIL NFR BLD AUTO: 2 % (ref 0–6)
ERYTHROCYTE [DISTWIDTH] IN BLOOD BY AUTOMATED COUNT: 12.3 % (ref 11.6–15.1)
GFR SERPL CREATININE-BSD FRML MDRD: 90 ML/MIN/1.73SQ M
GLUCOSE P FAST SERPL-MCNC: 115 MG/DL (ref 65–99)
HCT VFR BLD AUTO: 36.7 % (ref 36.5–49.3)
HGB BLD-MCNC: 11.7 G/DL (ref 12–17)
IMM GRANULOCYTES # BLD AUTO: 0.02 THOUSAND/UL (ref 0–0.2)
IMM GRANULOCYTES NFR BLD AUTO: 0 % (ref 0–2)
LYMPHOCYTES # BLD AUTO: 0.81 THOUSANDS/ÂΜL (ref 0.6–4.47)
LYMPHOCYTES NFR BLD AUTO: 12 % (ref 14–44)
MCH RBC QN AUTO: 33.5 PG (ref 26.8–34.3)
MCHC RBC AUTO-ENTMCNC: 31.9 G/DL (ref 31.4–37.4)
MCV RBC AUTO: 105 FL (ref 82–98)
MONOCYTES # BLD AUTO: 0.48 THOUSAND/ÂΜL (ref 0.17–1.22)
MONOCYTES NFR BLD AUTO: 7 % (ref 4–12)
NEUTROPHILS # BLD AUTO: 5.36 THOUSANDS/ÂΜL (ref 1.85–7.62)
NEUTS SEG NFR BLD AUTO: 79 % (ref 43–75)
NRBC BLD AUTO-RTO: 0 /100 WBCS
PLATELET # BLD AUTO: 301 THOUSANDS/UL (ref 149–390)
PMV BLD AUTO: 8 FL (ref 8.9–12.7)
POTASSIUM SERPL-SCNC: 4.3 MMOL/L (ref 3.5–5.3)
PROT SERPL-MCNC: 8.2 G/DL (ref 6.4–8.4)
RBC # BLD AUTO: 3.49 MILLION/UL (ref 3.88–5.62)
SODIUM SERPL-SCNC: 137 MMOL/L (ref 135–147)
WBC # BLD AUTO: 6.84 THOUSAND/UL (ref 4.31–10.16)

## 2023-02-27 ENCOUNTER — TRANSCRIBE ORDERS (OUTPATIENT)
Dept: FAMILY MEDICINE CLINIC | Facility: CLINIC | Age: 78
End: 2023-02-27

## 2023-02-27 ENCOUNTER — TELEPHONE (OUTPATIENT)
Dept: CARDIOLOGY CLINIC | Facility: CLINIC | Age: 78
End: 2023-02-27

## 2023-02-27 DIAGNOSIS — D64.9 ANEMIA, UNSPECIFIED TYPE: Primary | ICD-10-CM

## 2023-02-27 DIAGNOSIS — D53.9 ANEMIA ASSOCIATED WITH NUTRITIONAL DEFICIENCY: ICD-10-CM

## 2023-02-27 PROBLEM — D64.89 OTHER SPECIFIED ANEMIAS: Chronic | Status: ACTIVE | Noted: 2023-02-27

## 2023-02-27 NOTE — TELEPHONE ENCOUNTER
PC from patient who Dr Gisselle Galvez increased his Treprostinil 64 grams up 16 grams more  This is going to cost him $1100 more a month  He can afford it but is it really that important of an increase to cost him this much  He was hoping after finding this out, he could stay on regular dosing unless Dr Gisselle Galvez really feels it is necessary     [phone]

## 2023-03-08 ENCOUNTER — OFFICE VISIT (OUTPATIENT)
Dept: PALLIATIVE MEDICINE | Facility: CLINIC | Age: 78
End: 2023-03-08

## 2023-03-08 VITALS
TEMPERATURE: 97.1 F | WEIGHT: 164 LBS | RESPIRATION RATE: 18 BRPM | SYSTOLIC BLOOD PRESSURE: 100 MMHG | DIASTOLIC BLOOD PRESSURE: 60 MMHG | OXYGEN SATURATION: 91 % | BODY MASS INDEX: 19.45 KG/M2 | HEART RATE: 75 BPM

## 2023-03-08 DIAGNOSIS — J84.9 ILD (INTERSTITIAL LUNG DISEASE) (HCC): Chronic | ICD-10-CM

## 2023-03-08 DIAGNOSIS — Z71.89 COUNSELING REGARDING ADVANCED CARE PLANNING AND GOALS OF CARE: Primary | ICD-10-CM

## 2023-03-08 DIAGNOSIS — R06.09 DYSPNEA ON MINIMAL EXERTION: ICD-10-CM

## 2023-03-08 DIAGNOSIS — R26.2 AMBULATORY DYSFUNCTION: ICD-10-CM

## 2023-03-08 DIAGNOSIS — Z71.89 COUNSELING AND COORDINATION OF CARE: Primary | ICD-10-CM

## 2023-03-08 NOTE — PATIENT INSTRUCTIONS
PRESCRIPTION REFILL REMINDER:  All medication refills should be requested prior to [facility] on Friday  Any refill requests after noon on Friday would be addressed the following Monday  Please protect yourself from [place]   = Wash your hands  Soap and water, or hand  with at least 60% alcohol, are both effective at killing the virus  = Wear a mask  This will help protect others from any virus particles you might spread  Your mouth and nose BOTH need to be covered  = Keep the distance  Keep 6 feet of distance from other people, even if they seem healthy  Keeping distance protects you from the other person's virus spread     = Get a vaccine, and boost it  Three vaccines are approved for use in the [place] for all adults  These vaccines do provide protection against all known variants, and the new 'bi-valent' booster is predicted to be more protective against Omicron variants   + Pfizer is FDA approved for all persons age 11 and older   + [place] may be given to all person age 25 and older   - We do NOT recommend [address] vaccine for our Palliative Care patients  - [facility] is no longer offering regular COVID vaccine clinics  You may ask your primary doctor for help and advice  = you may also visit the CDC's complete list of approved sites for new vaccines: Vaccines  gov - Vaccine Location Search Results   (You may also visit Vaccines  gov and search for 'Bi-valent booster' in your zip code )  = [address]), [employer], [employer], [employer], and many [facility] locations ALL have shots   + The CDC recommends booster shots on ALL vaccines for ALL adults  = Test to treat  If you have symptoms, get tested, and get treatment!   New drugs like Paxlovid can prevent you from ever having to go to the hospital , and may be covered completely by your insurance or special government programs    - https://aspr hhs gov/TestToTreat/ Informacion en espanol sobre vacunas, de nos companeros de [facility] --  Nini lubin    Check out [employer] for Alexis data that are updated daily:    http://www Ceon/     Global Epidemics  Org, from [facility] (OUTPATIENT CAMPUS), will give you Ivnofz-dh-Sblzhh information on virus cases and vaccination rates:    Https://globalepidemics  org/    Frequently Asked Questions about COVID, answered by [facility]    Security[employer] es

## 2023-03-08 NOTE — PROGRESS NOTES
Palliative and Supportive Care   Shani Sosa 69 y o  male [id number]    Assessment/Plan:  1  Counseling regarding advanced care planning and goals of care    2  ILD (interstitial lung disease) ([address] )    3  Dyspnea on minimal exertion    4  Ambulatory dysfunction       Symptoms - continues to have dyspnea with minimal exertion, but for the most part, feels that this is stable  Ambulatory dysfunction largely due to MCGILL and deconditioning  · Continues to not ruben appropriate for opioid therapy yet (no apparent need yet, and risk >> benefit)  He also continues to live alone which make me worried about safe use of opioid given possible side effects that can lead to fall and fractures  He already had a fall 2 weeks ago  · Encourage use of walker to ensure balance  Encouraged to consider a wheelchair for longer distances, but not ready yet  · Already has HHA at home  · For now, continue management per Pulmo and/or cardio    ACP/Goals  · Patient and his daughter reported that they recently renewed his living will/POA  He names his daughter as his POA  We requested a copy to scan in his chart  · Patient continues to display good understanding, insight, and judgement into his medical condition  His daughter does as well and is very supportive of her father  He understands and volunteers his understanding that he will only worsen over time, this is only a matter of when  · They want to keep him at home (where he wants to be) for as long as his safety is guaranteed  He currently has a home health aid (?) that visits him once a week  They help him with light house cleaning and food preparation  · They plan to have someone assess his safety at home to provide recommendations for home modifications, if appropriate  · We discussed possible transfer to a nursing facility if appropriate  His daughter's place appears to still be an option as a last resort, but he expressed interest in a facility   I advised them to start looking for a place because there could be months-long wait  They appreciate this suggestion  · We discussed hospice as an option if the goal switches to pure comfort at home  Discussed that hospice can be provided at home as long as they have people in place to provide 24/7 cares at home in addition to hospice team who will not be present 24/7 at home  · They also met with [facility] for additional support  Please refer to separate note for details  Follow up  · RTO in 6 months, call sooner if needed    Requested Prescriptions      No prescriptions requested or ordered in this encounter     There are no discontinued medications  Representatives have queried the patient's controlled substance dispensing history in the Prescription Drug Monitoring Program in compliance with regulations before I have prescribed any controlled substances  The prescription history is consistent with prescribed therapy and our practice policies  45 minutes were spent face to face with Rocio Guevara and his daughter with greater than 50% of the time spent in counseling or coordination of care including discussions of etiology of diagnosis, pathogenesis of diagnosis, prognosis of diagnosis, diagnostic results, impression, and recommendations, risks and benefits of treatment, instructions for disease self management, treatment instructions, follow up requirements, risk factors and risk reduction of disease, patient and family counseling/involvement in care and compliance with treatment regimen, advanced care planning  All of the patient's questions were answered during this discussion  No follow-ups on file      Subjective:   Chief Complaint  Follow up visit for:  goal of care assessment and decisional support, disease process education and discussion of prognosis, advance care planning, emotional support in the setting of serious illness  HPI     Rocio Guevara is a 69 y o  male with ILD, pulmonary HTN, and chronic hypoxic respiratory failure who is on baseline 3L O2 at home  Currently on Ofev and Tyvaso  He is referred to palliative care by pulmonology given what appears to be worsening symptoms  He was last seen on initial consult on [id number] where we spent time discussing the natural trajectory of his lung disease to better understand and guide his goals of care  He reports having a living will and appointing his daughter to be his POA  He was asked to return today with his daughter to continue discussions on his goals  Since his last visit, he followed up with his pulmo and cardio  He was continued on the same regimen  He returns today with his daughter  I introduced palliative care to his daughter  He reports doing relatively well since his last visit  While he does not think he is getting better, he also does not feel like he is getting worse  His appetite, mood and sleep have improved  His long time PCP continues to manage this  He continues to have dyspnea and hypoxia with minimal exertion, even on level grounds  His O2 sats return to normal levels fairly rapidly but his dyspnea takes a little longer to recover with resting  He said he fell 2 weeks ago after getting up in the middle of the night to use the bathroom  He said he felt his legs getting weaker before finally giving out  He fell and hit his head  He got up on his own after struggling for a while  He informed his PCP afterwards  Since then, he has been using his walker more frequently, especially at night, to keep steady  This was a change since his last visit when he told me he does not want to use a walker  He was also given a cane to use for long distances, if he does not want to use the walker  In terms of support, they have a relatively good system in place  His daughter lives 25 minutes away but checks in on him frequently throughout the day, everyday  He calls her too if he needs anything  His sister is also checking in on him   He also receives [facility] once a week who helps with light chores at home  They are planning on asking this company to check his home for safety and to suggest any house modifications, if needed to ensure his safety  I encouraged him to ask us or his PCP for any DMEs he may need at home  Right now, he has no other needs  We explained the role of palliative in symptom management of his dyspnea  Advised that we rely on opioids and BZDs to manage this but that they come with unavoidable side effects  If it gets to the point where interventions from pulmo or cardio are no longer sufficient to provide him comfort, then opioids become necessary, outweighing risk and benefits  I do not recommend opioids at this time  And they both agreed  We again spent time discussing his living situation when he no longer can continue living independently  We discussed possible transfer to a nursing facility if appropriate  His daughter's place appears to still be an option as a last resort, but he expressed interest in a facility  I advised them to start looking for a place because there could be months-long wait  They appreciate this suggestion    We again discussed hospice as an option if the goal switches to pure comfort at home  Discussed that hospice can be provided at home as long as they have people in place to provide 24/7 cares at home in addition to hospice team who will not be present 24/7 at home  His wife  last year after a short dwan with multiple myeloma  She was on hospice for a few days before passing away  He does have some understanding of hospice  The following portions of the medical history were reviewed: past medical history, problem list, medication list, and social history      Current Outpatient Medications:   •  finasteride (PROSCAR) 5 mg tablet, TAKE 1 TABLET BY MOUTH EVERY DAY, Disp: 90 tablet, Rfl: 3  •  FLUoxetine (PROzac) 20 mg capsule, TAKE 1 CAPSULE BY MOUTH EVERY DAY, Disp: 90 capsule, Rfl: 2  •  fluticasone (FLONASE) 50 mcg/act nasal spray, 2 sprays into each nostril daily, Disp: 48 mL, Rfl: 1  •  guaifenesin-codeine (GUAIFENESIN AC) 100-10 MG/5ML liquid, 1-2 TSP Q6HR PRN COUGH, Disp: 120 mL, Rfl: 5  •  megestrol (MEGACE ES) 625 MG/5ML suspension, Take 5 mL (625 mg total) by mouth daily, Disp: 150 mL, Rfl: 3  •  Multiple Vitamin (MULTI-DAY VITAMINS) TABS, Take 1 tablet by mouth daily, Disp: , Rfl:   •  nintedanib esylate (OFEV) 150 MG CAPS capsule, Take 1 capsule (150 mg total) by mouth 2 (two) times a day, Disp: 60 capsule, Rfl: 11  •  tamsulosin (FLOMAX) 0 4 mg, TAKE 2 CAPSULES BY MOUTH DAILY WITH DINNER   , Disp: 180 capsule, Rfl: 3  •  temazepam (RESTORIL) 30 mg capsule, 1-2 hs  , Disp: 30 capsule, Rfl: 4  •  Treprostinil 64 MCG POWD, Inhale 64 mcg 4 (four) times a day, Disp: 112 each, Rfl: 11  •  fexofenadine (ALLEGRA) 180 MG tablet, Take 1 tablet (180 mg total) by mouth daily, Disp: 30 tablet, Rfl: 3  Review of Systems   Constitutional: Positive for activity change, appetite change, fatigue and unexpected weight change  Appetite is better   HENT: Negative for trouble swallowing  Respiratory: Positive for shortness of breath  Dyspnea on minimal exertion   Cardiovascular: Negative for chest pain  Gastrointestinal: Negative for abdominal pain, constipation, diarrhea, nausea and vomiting  Musculoskeletal: Negative for back pain  Neurological: Positive for weakness  Psychiatric/Behavioral: Negative for sleep disturbance  The patient is not nervous/anxious  All other systems reviewed and are negative  All other systems negative    Objective:  Vital Signs  /60 (BP Location: Left arm, Patient Position: Sitting, Cuff Size: Standard)   Pulse 75   Temp (!) 97 1 °F (36 2 °C) (Temporal)   Resp 18   Wt 74 4 kg (164 lb)   SpO2 91% Comment: 3 L NC  BMI 19 45 kg/m²    Physical Exam    Constitutional: Appears well-developed and well-nourished   Appears thin, chronically ill looking but not toxic appearing  Well kempt, pleasant, jovial  Appears stable  In no acute physical or emotional distress  Head: Normocephalic and atraumatic  Temporal mm wasting  Eyes: EOM are normal  No ocular discharge  No scleral icterus  Neck: No visible adenopathy or masses  Respiratory: Effort normal  No stridor  No respiratory distress  On 3L NC  Can complete long sentences without shortness of breath  Gastrointestinal: No abdominal distension  Musculoskeletal: No edema  Neurological: Alert, oriented and appropriately conversant  Skin: No diaphoresis, no rashes seen on exposed areas of skin  Pale  Psychiatric: Displays a normal mood and affect   Behavior, judgement and thought content appear normal      Ciaran Gonsalez MD  Palliative Medicine & Supportive Care  Internal Medicine  Available via [facility] Text  Office: [phone]  Fax: [phone]

## 2023-03-08 NOTE — PROGRESS NOTES
MSW met with the pt and his daughter, Nixon Navas  The pt reports he had a fall recently, but there were no injuries  He states he contacted his PCP and there was no need to go to the emergency room  He reports a quick recovery the next day  The pt and his daughter are very close and she has been identified as his POA  The pt and his daughter have been discussing resources to keep the pt in his home and he has been exploring resources (SNF) placements for when he is no longer able to remain in his home  The pt has an apple watch which has alerts in case the pt has a fall  He also has a walker by the bedside to assist him when he has to use the bathroom in the evening  The pt and his daughter have emailed his living will and POA paperwork to the MSW and it will be scanned into his chart  The pt does not have any needs from the MSW at this time  I have spent 30 minutes with Patient today in which greater than 50% of this time was spent in counseling/coordination of care regarding on going emotional support      Palliative  will follow-up as requested by patient, family, and primary team   Please contact with any specific requests

## 2023-03-10 DIAGNOSIS — R63.4 WEIGHT LOSS: ICD-10-CM

## 2023-03-12 RX ORDER — MEGESTROL ACETATE 125 MG/ML
625 SUSPENSION ORAL DAILY
Qty: 15 ML | Refills: 2 | Status: SHIPPED | OUTPATIENT
Start: 2023-03-12

## 2023-03-20 ENCOUNTER — TRANSCRIBE ORDERS (OUTPATIENT)
Dept: FAMILY MEDICINE CLINIC | Facility: CLINIC | Age: 78
End: 2023-03-20

## 2023-03-20 DIAGNOSIS — G47.00 INSOMNIA, UNSPECIFIED TYPE: ICD-10-CM

## 2023-03-20 RX ORDER — TEMAZEPAM 30 MG/1
CAPSULE ORAL
Qty: 30 CAPSULE | Refills: 4 | Status: SHIPPED | OUTPATIENT
Start: 2023-03-20

## 2023-03-27 ENCOUNTER — TRANSCRIBE ORDERS (OUTPATIENT)
Dept: FAMILY MEDICINE CLINIC | Facility: CLINIC | Age: 78
End: 2023-03-27

## 2023-03-27 DIAGNOSIS — R05.9 COUGH: ICD-10-CM

## 2023-03-27 RX ORDER — GUAIFENESIN AND CODEINE PHOSPHATE 100; 10 MG/5ML; MG/5ML
SOLUTION ORAL
Qty: 120 ML | Refills: 5 | Status: SHIPPED | OUTPATIENT
Start: 2023-03-27

## 2023-03-31 ENCOUNTER — OFFICE VISIT (OUTPATIENT)
Dept: UROLOGY | Facility: MEDICAL CENTER | Age: 78
End: 2023-03-31

## 2023-03-31 VITALS
SYSTOLIC BLOOD PRESSURE: 120 MMHG | HEART RATE: 77 BPM | WEIGHT: 165 LBS | HEIGHT: 77 IN | DIASTOLIC BLOOD PRESSURE: 76 MMHG | BODY MASS INDEX: 19.48 KG/M2

## 2023-03-31 DIAGNOSIS — Z12.5 PROSTATE CANCER SCREENING: Primary | ICD-10-CM

## 2023-03-31 NOTE — PROGRESS NOTES
3/31/2023    Assessment and Plan    69 y o  male managed by our office    1  Benign prostatic hyperplasia with lower urinary tract symptoms  ? Urine dip in office unremarkable  ? Continue tamsulosin and finasteride  ? PSA performed 2/2/2023 resulted 2 5  ? DOMINIQUE-prostate 35-40 g with no nodules noted  ? Repeat PSA and DOMINIQUE in 1 year  ? Follow-up in the office in 1 year     2  Erectile dysfunction  ? Continue with Viagra    History of Present Illness  Nina Barrientos is a 69 y o  male here for follow up evaluation of   urinary symptoms secondary to benign prostatic hyperplasia  Patient has been managed on tamsulosin and finasteride with good control of urinary symptoms  He denies all lower urinary tract symptoms at this time  Reports sensation of complete bladder emptying with urination  Reports getting up 1 time a night to urinate  He denies changes to his general health since prior office evaluation  Patient reports a history of erectile dysfunction and is managed with Viagra with good results  He denies side effects of medication  Patient reports since his last office evaluation diagnosis of pulmonary fibrosis and is now managed on oxygen via nasal cannula  He also reports recent passing of his wife secondary to multiple myeloma  Component       PSA, Total   Latest Ref Rng & Units       0 0 - 4 0 ng/mL   9/11/2020      7:43 AM 1 9   2/7/2022      4:02 PM 1 8   2/2/2023      2:07 PM 2 5     Review of Systems   Constitutional: Negative for chills and fever  Respiratory: Negative for cough and shortness of breath  Cardiovascular: Negative for chest pain  Gastrointestinal: Negative for abdominal distention, abdominal pain, blood in stool, nausea and vomiting  Genitourinary: Negative for difficulty urinating, dysuria, enuresis, flank pain, frequency, hematuria and urgency  Skin: Negative for rash       Past Medical History  Past Medical History:   Diagnosis Date   • Bifascicular block 3/8/2020 Asx: discovered on Ekg done 3/20 in eval of chest pain  • BPH with obstruction/lower urinary tract symptoms    • BPH with urinary obstruction    • 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    • Erectile dysfunction        Past Social History  Past Surgical History:   Procedure Laterality Date   • CARDIAC CATHETERIZATION N/A 10/18/2022    Procedure: Cardiac RHC; Surgeon: Elsy Strong DO;  Location: [facility];   Service: Cardiology   • COLONOSCOPY     • SKIN BIOPSY       Social History     Tobacco Use   Smoking Status Former   • Packs/day: 0 50   • Years: 5 00   • Pack years: 2 50   • Types: Cigarettes   • Start date: 1   • Quit date:    • Years since quittin 4   Smokeless Tobacco Never       Past Family History  Family History   Family history unknown: Yes       Past Social history  Social History     Socioeconomic History   • Marital status: /Civil Union     Spouse name: Not on file   • Number of children: Not on file   • Years of education: Not on file   • Highest education level: Not on file   Occupational History   • Not on file   Tobacco Use   • Smoking status: Former     Packs/day: 0 50     Years: 5 00     Pack years: 2 50     Types: Cigarettes     Start date:      Quit date:      Years since quittin 2   • Smokeless tobacco: Never   Vaping Use   • Vaping Use: Never used   Substance and Sexual Activity   • Alcohol use: Not Currently     Alcohol/week: 12 0 standard drinks     Types: 12 Cans of beer per week     Comment: socially   • Drug use: No   • Sexual activity: Not Currently   Other Topics Concern   • Not on file   Social History Narrative   • Not on file     Social Determinants of Health     Financial Resource Strain: Not on file   Food Insecurity: Not on file   Transportation Needs: Not on file   Physical Activity: Not on file   Stress: Not on file   Social Connections: Not on file   Intimate Partner Violence: Not on file   Housing Stability: "Not on file       Current Medications  Current Outpatient Medications   Medication Sig Dispense Refill   • ciclopirox (LOPROX) 0 77 % cream PLEASE SEE ATTACHED FOR DETAILED DIRECTIONS     • fexofenadine (ALLEGRA) 180 MG tablet Take 1 tablet (180 mg total) by mouth daily 30 tablet 3   • finasteride (PROSCAR) 5 mg tablet TAKE 1 TABLET BY MOUTH EVERY DAY 90 tablet 3   • FLUoxetine (PROzac) 20 mg capsule TAKE 1 CAPSULE BY MOUTH EVERY DAY 90 capsule 2   • fluticasone (FLONASE) 50 mcg/act nasal spray 2 sprays into each nostril daily 48 mL 1   • guaifenesin-codeine (GUAIFENESIN AC) 100-10 MG/5ML liquid 1-2 TSP Q6HR PRN COUGH 120 mL 5   • megestrol (MEGACE ES) 625 MG/5ML suspension TAKE 5 ML (625 MG TOTAL) BY MOUTH DAILY 15 mL 2   • Multiple Vitamin (MULTI-DAY VITAMINS) TABS Take 1 tablet by mouth daily     • nintedanib esylate (OFEV) 150 MG CAPS capsule Take 1 capsule (150 mg total) by mouth 2 (two) times a day 60 capsule 11   • tamsulosin (FLOMAX) 0 4 mg TAKE 2 CAPSULES BY MOUTH DAILY WITH DINNER    180 capsule 3   • temazepam (RESTORIL) 30 mg capsule 1-2 hs   30 capsule 4   • Treprostinil 64 MCG POWD Inhale 64 mcg 4 (four) times a day 112 each 11     No current facility-administered medications for this visit  Allergies  No Known Allergies      The following portions of the patient's history were reviewed and updated as appropriate: allergies, current medications, past medical history, past social history, past surgical history and problem list       Vitals  Vitals:    03/31/23 1559   BP: 120/76   Pulse: 77   Weight: 74 8 kg (165 lb)   Height: 6' 5\" (1 956 m)     Physical Exam  Physical Exam  Vitals reviewed  Constitutional:       General: He is not in acute distress  Appearance: Normal appearance  He is normal weight  HENT:      Head: Normocephalic  Cardiovascular:      Rate and Rhythm: Normal rate  Pulmonary:      Effort: No respiratory distress  Breath sounds: Normal breath sounds        Comments: " Oxygen NC  Genitourinary:     Comments: DOMINIQUE-prostate 40 g with no nodules  Skin:     General: Skin is warm and dry  Neurological:      General: No focal deficit present  Mental Status: He is alert and oriented to person, place, and time  Psychiatric:         Mood and Affect: Mood normal          Behavior: Behavior normal        Results  No results found for this or any previous visit (from the past 1 hour(s)) ]  Lab Results   Component Value Date    PSA 2 5 02/02/2023    PSA 1 8 02/07/2022    PSA 1 9 09/11/2020     Lab Results   Component Value Date    CALCIUM 9 1 02/26/2023    K 4 3 02/26/2023    CO2 31 02/26/2023     02/26/2023    BUN 15 02/26/2023    CREATININE 0 72 02/26/2023     Lab Results   Component Value Date    WBC 6 84 02/26/2023    HGB 11 7 (L) 02/26/2023    HCT 36 7 02/26/2023     (H) 02/26/2023     02/26/2023           Orders  Orders Placed This Encounter   Procedures   • PSA, Total Screen     This is a patient instruction: This test is non-fasting  Please drink two glasses of water morning of bloodwork          Standing Status:   Future     Standing Expiration Date:   9/30/2024       ONIEL Gibson

## 2023-04-05 ENCOUNTER — HOSPITAL ENCOUNTER (OUTPATIENT)
Dept: NON INVASIVE DIAGNOSTICS | Facility: HOSPITAL | Age: 78
Discharge: HOME | End: 2023-04-05
Attending: INTERNAL MEDICINE

## 2023-04-05 VITALS
HEIGHT: 77 IN | HEART RATE: 63 BPM | DIASTOLIC BLOOD PRESSURE: 76 MMHG | WEIGHT: 165 LBS | SYSTOLIC BLOOD PRESSURE: 120 MMHG | BODY MASS INDEX: 19.48 KG/M2

## 2023-04-05 DIAGNOSIS — J84.9 ILD (INTERSTITIAL LUNG DISEASE) (HCC): Chronic | ICD-10-CM

## 2023-04-05 DIAGNOSIS — I27.21 PAH (PULMONARY ARTERY HYPERTENSION) (HCC): ICD-10-CM

## 2023-04-05 LAB
AORTIC ROOT: 3.9 CM
APICAL FOUR CHAMBER EJECTION FRACTION: 49 %
ASCENDING AORTA: 3.5 CM
AV LVOT MEAN GRADIENT: 2 MMHG
AV LVOT PEAK GRADIENT: 3 MMHG
DOP CALC LVOT AREA: 3.8 CM2
DOP CALC LVOT DIAMETER: 2.2 CM
DOP CALC LVOT PEAK VEL VTI: 16.31 CM
DOP CALC LVOT PEAK VEL: 0.85 M/S
DOP CALC LVOT STROKE INDEX: 29.6 ML/M2
DOP CALC LVOT STROKE VOLUME: 61.97 CM3
E WAVE DECELERATION TIME: 413 MS
FRACTIONAL SHORTENING: 29 % (ref 28–44)
INTERVENTRICULAR SEPTUM IN DIASTOLE (PARASTERNAL SHORT AXIS VIEW): 1.1 CM
INTERVENTRICULAR SEPTUM: 1.1 CM (ref 0.6–1.1)
LAAS-AP2: 19.1 CM2
LAAS-AP4: 14.3 CM2
LEFT ATRIUM AREA SYSTOLE SINGLE PLANE A4C: 15.7 CM2
LEFT ATRIUM SIZE: 2.6 CM
LEFT INTERNAL DIMENSION IN SYSTOLE: 3.5 CM (ref 2.1–4)
LEFT VENTRICLE DIASTOLIC VOLUME (MOD BIPLANE): 89 ML
LEFT VENTRICLE SYSTOLIC VOLUME (MOD BIPLANE): 44 ML
LEFT VENTRICULAR INTERNAL DIMENSION IN DIASTOLE: 4.9 CM (ref 3.5–6)
LEFT VENTRICULAR POSTERIOR WALL IN END DIASTOLE: 0.9 CM
LEFT VENTRICULAR STROKE VOLUME: 59 ML
LV EF: 50 %
LVSV (TEICH): 59 ML
MV E'TISSUE VEL-LAT: 8 CM/S
MV E'TISSUE VEL-SEP: 5 CM/S
MV PEAK A VEL: 0.68 M/S
MV PEAK E VEL: 34 CM/S
MV STENOSIS PRESSURE HALF TIME: 120 MS
MV VALVE AREA P 1/2 METHOD: 1.83 CM2
RIGHT ATRIUM AREA SYSTOLE A4C: 15.5 CM2
RIGHT VENTRICLE ID DIMENSION: 4.5 CM
SINOTUBULAR JUNCTION: 3.4 CM
SL CV LEFT ATRIUM LENGTH A2C: 4.8 CM
SL CV PED ECHO LEFT VENTRICLE DIASTOLIC VOLUME (MOD BIPLANE) 2D: 111 ML
SL CV PED ECHO LEFT VENTRICLE SYSTOLIC VOLUME (MOD BIPLANE) 2D: 52 ML
SL CV SINUS OF VALSALVA 2D: 4.1 CM
STJ: 3.4 CM
TR MAX PG: 26 MMHG
TR PEAK VELOCITY: 2.5 M/S
TRICUSPID ANNULAR PLANE SYSTOLIC EXCURSION: 2 CM
TRICUSPID VALVE PEAK REGURGITATION VELOCITY: 2.53 M/S

## 2023-04-24 DIAGNOSIS — J30.89 NON-SEASONAL ALLERGIC RHINITIS, UNSPECIFIED TRIGGER: ICD-10-CM

## 2023-04-24 RX ORDER — FLUTICASONE PROPIONATE 50 MCG
2 SPRAY, SUSPENSION (ML) NASAL DAILY
Qty: 48 ML | Refills: 3 | Status: SHIPPED | OUTPATIENT
Start: 2023-04-24

## 2023-04-24 NOTE — TELEPHONE ENCOUNTER
fluticasone (FLONASE) 50 mcg/act nasal spray  Patient left a message requesting refills on this medication

## 2023-05-11 NOTE — PROGRESS NOTES
Outpatient Cardiology Note - Yadiel Benedict 69 y o  male MRN: [id number]    @ Encounter: [id number]        Patient Active Problem List    Diagnosis Date Noted   • Pulmonary hypertension ([facility] ) 10/18/2022   • Dyspnea on minimal exertion 03/08/2023   • Ambulatory dysfunction 03/08/2023   • Other specified anemias 02/27/2023   • Counseling regarding advanced care planning and goals of care 12/08/2022   • Primary insomnia 10/31/2022   • Abnormal weight loss 10/31/2022   • ILD (interstitial lung disease) ([facility] ) 10/07/2022   • ROSALINA (obstructive sleep apnea)    • COVID-19 07/07/2022   • Dermatitis 02/18/2022   • Dry nose 12/27/2021   • Shoulder pain 06/03/2020   • Left anterior hemiblock 03/08/2020   • Medicare annual wellness visit, initial 10/02/2019   • Cough 07/03/2019   • 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   • Other hemorrhoids 03/05/2019   • BPH with obstruction/lower urinary tract symptoms 06/13/2018   • Combined arterial insufficiency and corporo-venous occlusive erectile dysfunction 06/13/2018       Assessment:  # Pulmonary arterial HTN  Impression: ILD- PH with predominance of PAH over ILD; precapillary PAH  PAH Rx: Tyvaso DPI 64 mcg  Oxygen: 2 liters  Diuretic: not needed  Weight: 169 lbs  BNP 2/26/23: 129  NT proBNP: 6/30/22: 187    Functional assessment:  6 min walk 8/18/22: 135 meters, pulse ox dropped to 87%  6 min walk 7/13/22: 308 meters, pulse ox dropped to 90%  6 min walk 2/24/22: 476 meters    Spirometry/ PFTs:  Spirometry 9/22/22: moderately to severely restrictive  PFTs 3/16/22: mild restrictive, DLCO 29%    Studies- personally reviewed by me  Echo 4/5/23:  LVEF: 50%  Rv: mildly dilated, normal function  PASP: normal  RVOT: no notching    RHC 10/18/22:  SaO2: 93%  HR: 60  RA: 1 mmHg  PA: 53/18/30 mmHg  PCWP: 3  PA sat 65%  Shaheen Co/CI: 4 7/2 4 L/min  PVR: 5 7 HOANG    Echo 9/17/22  LVEF: 50%  RV: mildly dilated- RVID 4 cm  PASP: 72 mmHg  RVOT: no notching  Aorta 4 2 cm  Dilated IVC    HRCT 3/11/22: biapical pleural parenchymal thickening  Main PA 3 8 cm    # ILD- pulmonary fibrosis  Dr Rosendo Villar  # 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  # COVID, May    Today's Plan:  RV and NT proBNP at goal  ILD per Dr Rosendo Villar  6 min walk deferred  NT proBNP at lower risk  Already completed cardiac rehab  Wants to try again, will order    HPI:      70 yo former smoker diagnosed with ILD referred for evaluation of pulmonary hypertension  He also has untreated ROSALINA and has been experiencing worsening MCGILL  His echo has shown indirect PA pressures at 72 mmHg with dilated IVC, CT chest showing pulmonary fibrosis with enlargement of main PA of 3 8 cm  Had Farhad in May, hit him hard  Lost his wife this year to multiple myeloma  He had a tough year  No hx of spinal stenosis or carpal tunnel    Interim:  Echo 4/5/23:  LVEF: 50%  Rv: mildly dilated, normal function  PASP: normal  RVOT: no notching    Does not feel any better  Finds it harder to do exertion than 6 months ago    Past Medical History:   Diagnosis Date   • Bifascicular block 3/8/2020    Asx: discovered on Ekg done 3/20 in eval of chest pain  • BPH with obstruction/lower urinary tract symptoms    • BPH with urinary obstruction    • 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    • Erectile dysfunction        Review of Systems   Constitutional: Negative for activity change, appetite change, fatigue and unexpected weight change  HENT: Negative for congestion and nosebleeds  Eyes: Negative  Respiratory: Positive for shortness of breath  Negative for cough and chest tightness  Cardiovascular: Negative for chest pain, palpitations and leg swelling  Gastrointestinal: Negative for abdominal distention  Endocrine: Negative  Genitourinary: Negative  Musculoskeletal: Negative  Skin: Negative  Neurological: Negative for dizziness, syncope and weakness  Hematological: Negative  Psychiatric/Behavioral: Negative          No Known Allergies    Current Outpatient Medications:   •  ciclopirox (LOPROX) 0 77 % cream, PLEASE SEE ATTACHED FOR DETAILED DIRECTIONS, Disp: , Rfl:   •  fexofenadine (ALLEGRA) 180 MG tablet, Take 1 tablet (180 mg total) by mouth daily, Disp: 30 tablet, Rfl: 3  •  finasteride (PROSCAR) 5 mg tablet, TAKE 1 TABLET BY MOUTH EVERY DAY, Disp: 90 tablet, Rfl: 3  •  FLUoxetine (PROzac) 20 mg capsule, TAKE 1 CAPSULE BY MOUTH EVERY DAY, Disp: 90 capsule, Rfl: 2  •  fluticasone (FLONASE) 50 mcg/act nasal spray, 2 sprays into each nostril daily, Disp: 48 mL, Rfl: 3  •  guaifenesin-codeine (GUAIFENESIN AC) 100-10 MG/5ML liquid, 1-2 TSP Q6HR PRN COUGH, Disp: 120 mL, Rfl: 5  •  megestrol (MEGACE ES) 625 MG/5ML suspension, TAKE 5 ML (625 MG TOTAL) BY MOUTH DAILY, Disp: 15 mL, Rfl: 2  •  Multiple Vitamin (MULTI-DAY VITAMINS) TABS, Take 1 tablet by mouth daily, Disp: , Rfl:   •  nintedanib esylate (OFEV) 150 MG CAPS capsule, Take 1 capsule (150 mg total) by mouth 2 (two) times a day, Disp: 60 capsule, Rfl: 11  •  tamsulosin (FLOMAX) 0 4 mg, TAKE 2 CAPSULES BY MOUTH DAILY WITH DINNER   , Disp: 180 capsule, Rfl: 3  •  temazepam (RESTORIL) 30 mg capsule, 1-2 hs  , Disp: 30 capsule, Rfl: 4  •  Treprostinil 64 MCG POWD, Inhale 64 mcg 4 (four) times a day, Disp: 112 each, Rfl: 11    Social History     Socioeconomic History   • Marital status: /Civil Union     Spouse name: Not on file   • Number of children: Not on file   • Years of education: Not on file   • Highest education level: Not on file   Occupational History   • Not on file   Tobacco Use   • Smoking status: Former     Packs/day: 0 50     Years: 5 00     Pack years: 2 50     Types: Cigarettes     Start date:      Quit date:      Years since quittin 3   • Smokeless tobacco: Never   Vaping Use   • Vaping Use: Never used   Substance and Sexual Activity   • Alcohol use: Not Currently     Alcohol/week: 12 0 standard drinks     Types: 12 Cans of beer per week     Comment: socially   • Drug use: No   • Sexual activity: Not Currently   Other Topics Concern   • Not on file   Social History Narrative   • Not on file     Social Determinants of Health     Financial Resource Strain: Not on file   Food Insecurity: Not on file   Transportation Needs: Not on file   Physical Activity: Not on file   Stress: Not on file   Social Connections: Not on file   Intimate Partner Violence: Not on file   Housing Stability: Not on file       Family History   Family history unknown: Yes       Physical Exam:    Vitals: There were no vitals taken for this visit  , There is no height or weight on file to calculate BMI ,   Wt Readings from Last 3 Encounters:   04/05/23 74 8 kg (165 lb)   03/31/23 74 8 kg (165 lb)   03/08/23 74 4 kg (164 lb)       Physical Exam:  There were no vitals filed for this visit  Physical Exam  Constitutional:       Appearance: He is well-developed  HENT:      Head: Normocephalic and atraumatic  Eyes:      Pupils: Pupils are equal, round, and reactive to light  Neck:      Vascular: No JVD  Cardiovascular:      Rate and Rhythm: Normal rate and regular rhythm  Heart sounds: No murmur heard  Pulmonary:      Effort: Pulmonary effort is normal  No respiratory distress  Breath sounds: Normal breath sounds  Abdominal:      General: There is no distension  Palpations: Abdomen is soft  Tenderness: There is no abdominal tenderness  Musculoskeletal:         General: Normal range of motion  Cervical back: Normal range of motion  Skin:     General: Skin is warm and dry  Findings: No rash  Neurological:      Mental Status: He is alert and oriented to person, place, and time         Labs & Results:    Lab Results   Component Value Date    WBC 6 84 02/26/2023    HGB 11 7 (L) 02/26/2023    HCT 36 7 02/26/2023     (H) 02/26/2023     02/26/2023     Lab Results   Component Value Date    SODIUM 137 02/26/2023    K 4 3 02/26/2023     02/26/2023    CO2 31 02/26/2023    BUN 15 02/26/2023    CREATININE 0 72 02/26/2023    GLUC 104 10/18/2022    CALCIUM 9 1 02/26/2023     Lab Results   Component Value Date     (H) 02/26/2023      Lab Results   Component Value Date    CHOLESTEROL 136 12/03/2021     Lab Results   Component Value Date    HDL 68 12/03/2021     Lab Results   Component Value Date    TRIG 43 12/03/2021     Lab Results   Component Value Date    NONHDLC 68 12/03/2021     EKG personally reviewed by Chau Croft  Counseling / Coordination of Care  Time spent today 25 minutes  Greater than 50% of total time was spent with the patient and / or family counseling and / or coordination of care  We discussed diagnoses, most recent studies and any changes in treatment  Thank you for the opportunity to participate in the care of this patient      [address] [facility] [place] [facility] [place] [facility]  MEDICAL DIRECTOR OF [place]

## 2023-05-15 ENCOUNTER — OFFICE VISIT (OUTPATIENT)
Dept: CARDIOLOGY CLINIC | Facility: CLINIC | Age: 78
End: 2023-05-15

## 2023-05-15 VITALS
BODY MASS INDEX: 19.53 KG/M2 | SYSTOLIC BLOOD PRESSURE: 115 MMHG | OXYGEN SATURATION: 95 % | WEIGHT: 164.7 LBS | DIASTOLIC BLOOD PRESSURE: 50 MMHG | HEART RATE: 75 BPM

## 2023-05-15 DIAGNOSIS — J84.9 ILD (INTERSTITIAL LUNG DISEASE) (HCC): Chronic | ICD-10-CM

## 2023-05-15 DIAGNOSIS — G47.33 OSA (OBSTRUCTIVE SLEEP APNEA): ICD-10-CM

## 2023-05-15 DIAGNOSIS — I27.21 PAH (PULMONARY ARTERY HYPERTENSION) (HCC): Primary | ICD-10-CM

## 2023-06-01 ENCOUNTER — TREATMENT (OUTPATIENT)
Dept: FAMILY MEDICINE CLINIC | Facility: CLINIC | Age: 78
End: 2023-06-01

## 2023-06-01 DIAGNOSIS — R63.4 WEIGHT LOSS: ICD-10-CM

## 2023-06-01 RX ORDER — MEGESTROL ACETATE 125 MG/ML
625 SUSPENSION ORAL DAILY
Qty: 450 ML | Refills: 3 | Status: SHIPPED | OUTPATIENT
Start: 2023-06-01

## 2023-06-12 ENCOUNTER — TELEPHONE (OUTPATIENT)
Dept: FAMILY MEDICINE CLINIC | Facility: CLINIC | Age: 78
End: 2023-06-12

## 2023-06-13 ENCOUNTER — CLINICAL SUPPORT (OUTPATIENT)
Dept: PULMONOLOGY | Facility: CLINIC | Age: 78
End: 2023-06-13
Payer: MEDICARE

## 2023-06-13 DIAGNOSIS — I27.21 PAH (PULMONARY ARTERY HYPERTENSION) (HCC): Primary | ICD-10-CM

## 2023-06-13 NOTE — PROGRESS NOTES
Pulmonary Rehabilitation Plan of Care   Initial Care Plan      Today's date: 2023   # of Exercise Sessions Completed: 1-Evaluation  Patient name: Ziyad Downs      : 1945  Age: 69 y o  MRN: [id number]  Referring Physician: Ankit Silva  Pulmonologist: Tessie Calixto MD; Cardiologist: Jovany Dooley DO  Provider: [facility]  Clinician: Immanuel Marina MS, CEP    Dx:   Encounter Diagnosis   Name Primary? • PAH (pulmonary artery hypertension) ([facility] )      Date of onset: 5/15/2023      SUMMARY OF PROGRESS:  Today is Shan's initial evaluation to begin Pulmonary Rehab for the diagnosis of PAH  He follows with Dr Richard Morse of Cardiology  Mary Carmen Anderson previously completed pulmonary rehab in 2023 for ILD  He has hx of COVID (May 2022) and untreated ROSALINA, for which he now wears supplemental O2 for sleep  Since discharge in January, he has not been able to keep up with exercise on his own  He reports being in worse condition now because of that  He can perform self care, but has difficulty with all other activities around the house, including doing the   He reports bodily weakness, mostly in his legs  No physical limitations reported  Depression screening using the PHQ-9 interprets the patient's score of 4 1-4 = Minimal Depression  Anxiety screening using the DON-7 interprets the patients score of 0  0-4  = Not anxious  When addressed, the patient admits to having depression/anxiety, but has seen much improvement since the last time he was here  Patient reports excellent social/emotional support from his daughters, granddaughter and sister  He also began spending time with friends for lunch 1x/week  Mary Carmen Anderson used to be a social smoker in his early 25s for 3-4 years but has abstained  Patient admits to 100% medication compliance  At rest, the patient rated dyspnea 1/10 with SpO2 97% on supplemental O2 3L/min via nasal canula    They completed an initial 6MWT, walking 900 ft on supplemental O2 3L/min via nasal canula  The patient’s rating of perceived dyspnea during the 6MWT was /10 with SpO2 82-87%  Patient took no rest breaks  Telemetry revealed NSR with rare PVCs  Resting  /80 with appropriate hemodynamic response to exercise reaching 140/74  Patient will exercise on supplemental O2 3L/min via nasal canula and will titrate O2 to maintain SpO2 >90%  Education on smoking cessation, oxygen use, breathing techniques, pulmonary anatomy, exercise for the pulmonary patient, healthy eating, stress, and relaxation will be provided  Patient goals include: get back into exercise routine  Keshia Burris will attend 24 exercise sessions, 2x/wk for 12-18 weeks beginning 2023  Will progress patient as tolerated over the next 30 days        Medication compliance: Yes   Comments: Pt reports to be compliant with medications  Fall Risk: Low   Comments: experiences lightheadedness that has caused fall in the past few years ago; no falls since    Smoking: Former user  Was smoking socially for 3-4 years in early 25s    RPD at Rest: 1/10  RPD with Exercise:  6/10    Assessment of progression of lung disease and functional status:  CAT: 2540 initial  Shortness of breath questionnaire: 59/120 initial      EXERCISE ASSESSMENT and PLAN    Current Exercise Program in Rehab:       Frequency: 2 days/week   Supplement with home exercise 2+ days/wk as tolerated        Minutes: 30         METS: 2 0-2 5              SpO2: >90%              RPD: 4-6                      HR: RHR+30bpm   RPE: 4-5         Modalities: UBE, NuStep and Recumbent bike      Exercise Progression 30 Day Goals :    Frequency: 2 days/week    Supplement with home exercise 2+ days/wk as tolerated       Minutes: 35         METS: 2 0-3 0              SpO2: >90%              RPD: 4-6                      HR: RHR+30bpm   RPE: 4-5        Modalities: UBE, NuStep, Recumbent bike and Room walking     Strength trainin-3 days / week  12-15 repetitions  1-2 sets per modality   Will be added following 2-3 weeks of monitored exercise sessions   Modalities: Chest Press, Lateral Raise, Arm Extension, Arm Curl, Sit to [employer], Shoulder Shrugs and Calf Raises    Oxygen Needs: supplemental O2 via nasal cannula @ 3L/min at rest, supplemental O2 via nasal cannula @3L/min with exercise and O2 while sleeping   Oxygen Goal: Maintain SpO2>90% during exercise    Home Exercise: none  Education: pursed lipped breathing, home exercise, benefits of exercise for pulmonary disease, RPD scale and appropriate O2 response to exercise    Goals: reduced score on  USCD Shortness of Breath Questionnaire, Improved 6MW distance by 10%, reduced dyspnea during exercise (0-3/10), improved exercise tolerance (max METs tolerated in pulmonary rehab), SpO2 >90% during exercise, reduced score on CAT, attend pulmonary rehab regularly, decrease sitting time at home and start a walking program  Progressing:  Reviewed Pt goals and determined plan of care    Plan: Titrate supplemental oxygen as needed to maintain SpO2>90% with exercise, learn to conserve energy with ADLs , practice breathing techniques 3x/day and reduce time sitting at home    Readiness to change: Preparation:  (Getting ready to change)       NUTRITION ASSESSMENT AND PLAN    Weight control:    Starting weight: 161 6 lb   Current weight:       Diabetes: N/A    Goals:Improved Rate Your Plate score  >82, eliminate processed meats, increase intake of fish, shellfish, use low fat dairy, choose low sodium processed foods, eliminate butter and daily saturated fat intake <7%/13g  Education: hydration  Progressing:Reviewed Pt goals and determined plan of care  Plan: Education class: Reading Food Labels, Education Class: Heart Healthy Eating, avoid processed foods and learn how to read food labels  Readiness to change: Preparation:  (Getting ready to change)       PSYCHOSOCIAL ASSESSMENT AND PLAN    Emotional:  Depression assessment:  PHQ-9 = 4 1-4 = Minimal Depression            Anxiety measure:  DON-7 = 0 0-4  = Not anxious  Self-reported stress level: 5   Social support: Patient reports excellent emotional/social support from daughters, granddaughter and sister and began going out more with friends 1x/week    Goals:  Reduce perceived stress to 1-3/10, improved [facility] QOL < 27, PHQ-9 - reduced severity by one level, Physical Fitness in [facility] Score < 3, Daily Activity in [facility] Score < 3, Social Activities in [facility] Score < 3, Overall Health in [facility] Score < 3, Quality of Life in [facility] Score < 3 , Change in Health in [facility] Score < 3 , Increased interest in doing things and increased energy  Education: benefits of a positive support system    Progressing:Reviewed Pt goals and determined plan of care  Plan: Class: Stress and Your Health, Refer to [employer], Practice relaxation techniques, Exercise, Enjoy a hobby, Keep a positive mindset and Enjoy family  Readiness to change: Preparation:  (Getting ready to change)       OTHER CORE COMPONENTS     Tobacco:   Social History     Tobacco Use   Smoking Status Former   • Packs/day: 0 50   • Years: 5 00   • Total pack years: 2 50   • Types: Cigarettes   • Start date:    • Quit date: 26   • Years since quittin 4   Smokeless Tobacco Never       Tobacco Use Intervention: Referral to tobacco expert:   N/A: Pt has a remote history of smoking    Blood pressure:    Restin/80   Exercise: 140/74    Goals: consistent BP < 130/80, reduced dietary sodium <2300mg, moderate intensity exercise >150 mins/wk and medication compliance  Education:  pathophysiology of pulmonary disease and environmental triggers  Progressing:Reviewed Pt goals and determined plan of care  Plan: Avoid Processed foods, engage in regular exercise, eliminate salt shaker at the table, use salt substitutes and check labels for sodium content  Readiness to change: Preparation:  (Getting ready to change)

## 2023-06-13 NOTE — PROGRESS NOTES
"PULMONARY REHAB ASSESSMENT    Today's date: 2023  Patient name: Whitney Glez     : 1945       MRN: [id number]  PCP: Ricardo Sultana MD  Referring Physician: Fer Barros seeluz Orourke for [facility]  Pulmonologist: Uma Rodríguez MD    Dx: [facility] [id number]      Date of onset: 5/15/2023      Weight:    Wt Readings from Last 1 Encounters:   05/15/23 74 7 kg (164 lb 11 2 oz)      Height:   Ht Readings from Last 1 Encounters:   23 6' 5\" (1 956 m)     Medical History:   Past Medical History:   Diagnosis Date   • Bifascicular block 3/8/2020    Asx: discovered on Ekg done 3/20 in eval of chest pain  • BPH with obstruction/lower urinary tract symptoms    • BPH with urinary obstruction    • 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    • Erectile dysfunction          Physical Limitations: none reported    Oxygen needs: 3LO2 rest exercise and sleep    Rating of Perceived Dyspnea at rest:  1/10    History of Toxic Exposure:  None    Risk Factors   Cholesterol: No  Smoking: Former user  Was smoking socially for 3-4 years in early   HTN: No  DM: No  Obesity: No- has lost weight (40lbs) over last year or so  Inactivity: Yes  Stress:  perceived  stress: /10   Stressors: worsening health   Goals for Stress Management:exercise again    Family History:  Family History   Family history unknown: Yes       Allergies: Patient has no known allergies    ETOH:   Social History     Substance and Sexual Activity   Alcohol Use Not Currently   • Alcohol/week: 12 0 standard drinks of alcohol   • Types: 12 Cans of beer per week    Comment: socially         Current Medications:   Current Outpatient Medications   Medication Sig Dispense Refill   • megestrol (MEGACE ES) 625 MG/5ML suspension Take 5 mL (625 mg total) by mouth daily 450 mL 3   • ciclopirox (LOPROX) 0 77 % cream PLEASE SEE ATTACHED FOR DETAILED DIRECTIONS     • fexofenadine (ALLEGRA) 180 MG tablet Take 1 tablet (180 mg total) by mouth daily " 30 tablet 3   • finasteride (PROSCAR) 5 mg tablet TAKE 1 TABLET BY MOUTH EVERY DAY 90 tablet 3   • FLUoxetine (PROzac) 20 mg capsule TAKE 1 CAPSULE BY MOUTH EVERY DAY 90 capsule 2   • fluticasone (FLONASE) 50 mcg/act nasal spray 2 sprays into each nostril daily 48 mL 3   • Multiple Vitamin (MULTI-DAY VITAMINS) TABS Take 1 tablet by mouth daily     • nintedanib esylate (OFEV) 150 MG CAPS capsule Take 1 capsule (150 mg total) by mouth 2 (two) times a day 60 capsule 11   • tamsulosin (FLOMAX) 0 4 mg TAKE 2 CAPSULES BY MOUTH DAILY WITH DINNER    180 capsule 3   • temazepam (RESTORIL) 30 mg capsule 1-2 hs   30 capsule 4   • Treprostinil 64 MCG POWD Inhale 64 mcg 4 (four) times a day 112 each 11     No current facility-administered medications for this visit             Functional Status Prior to Diagnosis for Treatment   Occupation: retired  ADL’s: Capable of performing light to moderate ADLs limited by Dyspnea  [place]: Capable of performing light to moderate ADLs limited by Dyspnea  Exercise: early 2022 was walking 2-3 miles/day; previous pulm rehab DC Jan 2023, did rec bike for 1 month post and then stopped    Current Functional Status  Occupation: retired  ADL’s:Capable of performing light ADLs only limited by Dyspnea  Lightheadedness  Weakness  [place]: Capable of performing light ADLs only limited by Dyspnea  Lightheadedness  Weakness  Exercise: None    Patient Specific Goals:  Get back into exercise routine again    Short Term Program Goals: dietary modifications increased strength improved energy/stamina with ADLs exercise 120-150 mins/wk    Long Term Goals: increased maximal walking duration  increased intial training workload  Improved functional capacity  Improved Quality of Life - [facility] score reduced  improved Rate Your Plate Score    Oxygen Goals: Maintain SpO2>90% titrating supplemental oxygen as needed     Ability to reach goals/rehabilitation potential:  Good    Projected return to function: 8-12 weeks  Objective tests: 6 MWT      Nutritional   Reviewed details of Rate your Plate  Discussed key elements of heart healthy eating  Reviewed patient goals for dietary modifications and their clinical implications  Reviewed most recent lipid profile  Goals for dietary modification: choose lean cuts of meat  eliminate processed meats  increase fish intake  low fat dairy   reduced fat cheese  increase whole grains  increase fruits and vegetables  eliminate butter  low sodium  improved snack choices  reduce sweets/frozen desserts      Emotional/Social  Patient reports feeling much better mentally with depression and anxiety over the last few months; much improvement from when I first saw him Sept 2022    [address]    Marital status:       Domestic Violence Screening: No    Comments: previous pulm rehab DC in Jan 2023; evaluated by Dr Trino [facility] for [facility]; now wears O2 for sleep at night 3L, helps  Has to take breaks even doing   SpO2 drops very low 65-80% during activities at home  Reports recovering quickly  Was using rec bike after rehab but maybe for 1 month, but states he doesn't care to exercise  Tries to go to daughters house often  Started to go out to lunch with friends once a week  Feels better when he goes out but doesn't want to since his home is easier to get around  Still experiences lightheadedness when doing things sometimes, sits to rest and feels better  Uses walker when getting up in the middle of the night when going to the bathroom, takes sleeping pills

## 2023-06-20 ENCOUNTER — CLINICAL SUPPORT (OUTPATIENT)
Dept: PULMONOLOGY | Facility: CLINIC | Age: 78
End: 2023-06-20
Payer: MEDICARE

## 2023-06-20 DIAGNOSIS — I27.21 PAH (PULMONARY ARTERY HYPERTENSION) (HCC): Primary | ICD-10-CM

## 2023-06-20 PROCEDURE — G0239 OTH RESP PROC, GROUP: HCPCS

## 2023-06-22 ENCOUNTER — CLINICAL SUPPORT (OUTPATIENT)
Dept: PULMONOLOGY | Facility: CLINIC | Age: 78
End: 2023-06-22
Payer: MEDICARE

## 2023-06-22 DIAGNOSIS — I27.21 PAH (PULMONARY ARTERY HYPERTENSION) (HCC): Primary | ICD-10-CM

## 2023-06-22 PROCEDURE — G0239 OTH RESP PROC, GROUP: HCPCS

## 2023-06-27 ENCOUNTER — CLINICAL SUPPORT (OUTPATIENT)
Dept: PULMONOLOGY | Facility: CLINIC | Age: 78
End: 2023-06-27
Payer: MEDICARE

## 2023-06-27 DIAGNOSIS — I27.21 PAH (PULMONARY ARTERY HYPERTENSION) (HCC): Primary | ICD-10-CM

## 2023-06-27 PROCEDURE — G0239 OTH RESP PROC, GROUP: HCPCS

## 2023-06-29 ENCOUNTER — CLINICAL SUPPORT (OUTPATIENT)
Dept: PULMONOLOGY | Facility: CLINIC | Age: 78
End: 2023-06-29
Payer: MEDICARE

## 2023-06-29 DIAGNOSIS — I27.21 PAH (PULMONARY ARTERY HYPERTENSION) (HCC): Primary | ICD-10-CM

## 2023-06-29 PROCEDURE — G0239 OTH RESP PROC, GROUP: HCPCS

## 2023-07-05 ENCOUNTER — CLINICAL SUPPORT (OUTPATIENT)
Dept: PULMONOLOGY | Facility: CLINIC | Age: 78
End: 2023-07-05
Payer: MEDICARE

## 2023-07-05 DIAGNOSIS — I27.21 PAH (PULMONARY ARTERY HYPERTENSION) (HCC): Primary | ICD-10-CM

## 2023-07-05 PROCEDURE — G0239 OTH RESP PROC, GROUP: HCPCS

## 2023-07-06 ENCOUNTER — APPOINTMENT (OUTPATIENT)
Dept: PULMONOLOGY | Facility: CLINIC | Age: 78
End: 2023-07-06
Payer: MEDICARE

## 2023-07-06 NOTE — PROGRESS NOTES
Pulmonary Rehabilitation Plan of Care   30 Day Reassessment      Today's date: 2023   # of Exercise Sessions Completed: 6  Patient name: Fuad Dominguez      : 1945  Age: 69 y.o. MRN: [id number]  Referring Physician: Tiara Saleh DO  Pulmonologist: Gilbert Bang MD; Cardiologist: Tiara Saleh DO  Provider: [facility]  Clinician: Naomie Downs MS, CEP    Dx:   Encounter Diagnosis   Name Primary? • PAH (pulmonary artery hypertension) ([address]) Yes     Date of onset: 5/15/2023      SUMMARY OF PROGRESS:  Augusta Okeefe is compliant with pulmonary rehab 2 days/week. He has been using his recumbent cycle at home 2-3 days/week for 10-25 min and plans to start walking more in his home. Shan tolerates 35-40 min of exercise at 1.2-2.3 METs. In the next 30 days, Shan's room walking will be increased with a goal to walk on the treadmill regularly. Last time he attended pulmonary rehab, the treadmill was started and stopped again due to frequent lightheadedness. Augusta Okeefe continues to report lightheadedness, especially with walking around at home. Resting SpO2 94-97% with decreased SpO2 to 90-98%  during exercise and drop tp 83-88% while walking on 3L supplemental O2. His supplemental O2 will be increased to 4L for walking in the coming weeks to maintain SpO2 >90%. Augusta Okeefe reports one instance of taking his O2 off to take the garbage out (approx 30 ft walk) and SpO2 dropped to 65% when he got back to the house. Resting HR 66-73 bpm with normal HR response to exercise reaching  bpm. Normal resting /66 - 118/80. Augusta Okeefe denies dyspnea at rest a with increased dyspnea to 0-3/10 during exercise. When addressed, Augusta Okeefe admits to having depression/anxiety, but makes a very conscious effort to go out and be social with friends. He does report some difficulty with getting himself ready to go, but tries not to let that stop him. Augusta Okeefe also reports excellent social/emotional support from his daughters, granddaughter and sister.  Patient goals include: get back into exercise routine. Exercise intensity will continue to be progressed as tolerated, home exercise encouraged, and walking duration increased in the next 30 days of pulmonary rehab.       Medication compliance: Yes   Comments: Pt reports to be compliant with medications  Fall Risk: Low   Comments: experiences lightheadedness that has caused fall in the past few years ago; no falls since    Smoking: Former user  Was smoking socially for 3-4 years in early     RPD at Rest: 0/10  RPD with Exercise:  0-310    Assessment of progression of lung disease and functional status:  CAT:  initial  Shortness of breath questionnaire: 59/120 initial      EXERCISE ASSESSMENT and PLAN    Current Exercise Program in Rehab:       Frequency: 2 days/week   Supplement with home exercise 2+ days/wk as tolerated        Minutes: 35-40         METS: 1.3-2.3              SpO2: 90-98% (83-88% walking)              RPD: 4-6                      HR:    RPE: 4-5         Modalities: UBE, NuStep, Recumbent bike and Room walking      Exercise Progression 30 Day Goals :    Frequency: 2 days/week    Supplement with home exercise 2+ days/wk as tolerated       Minutes: 35-40         METS: 2.0-2.5               SpO2: >90%              RPD: 4-6                      HR: RHR+30bpm   RPE: 4-5        Modalities: Treadmill, UBE, NuStep, Recumbent bike and Room walking     Strength trainin-3 days / week  12-15 repetitions  1-2 sets per modality   Will be added following 2-3 weeks of monitored exercise sessions   Modalities: Chest Press, Lateral Raise, Arm Extension, Arm Curl, Sit to [employer], Shoulder Shrugs and Calf Raises    Oxygen Needs: supplemental O2 via nasal cannula @ 3L/min at rest, supplemental O2 via nasal cannula @3-4L/min with exercise and O2 while sleeping   Oxygen Goal: Maintain SpO2>90% during exercise    Home Exercise: Type: recumbent cycle, Frequency: 2-3 days/week, Duration: 10- mins  Education: pursed lipped breathing, home exercise, benefits of exercise for pulmonary disease, RPD scale and appropriate O2 response to exercise    Goals: reduced score on  USCD Shortness of Breath Questionnaire, Improved 6MW distance by 10%, reduced dyspnea during exercise (0-3/10), improved exercise tolerance (max METs tolerated in pulmonary rehab), SpO2 >90% during exercise, reduced score on CAT, attend pulmonary rehab regularly, decrease sitting time at home and start a walking program  Progressing:  Pt is progressing and showing improvement  toward the following goals:  compliant with pulmonary rehab exercise, increased exercise duration, started home exercise.  , Patient will increase focus on walking duration and frequency in the next 30 days, Will continue to educate and progress as tolerated. Plan: Titrate supplemental oxygen as needed to maintain SpO2>90% with exercise, learn to conserve energy with ADLs , practice breathing techniques 3x/day and reduce time sitting at home    Readiness to change: Action:  (Changing behavior)      NUTRITION ASSESSMENT AND PLAN    Weight control:    Starting weight: 161.6 lb   Current weight:   161.6 lb    Diabetes: N/A    Goals:Improved Rate Your Plate score  >98, eliminate processed meats, increase intake of fish, shellfish, use low fat dairy, choose low sodium processed foods, eliminate butter and daily saturated fat intake <7%/13g  Education: hydration  Progressing:Pt has not made progress toward the following goals: no changes reported at this time. , Will continue to educate and progress as tolerated.   Plan: Education class: Reading Food Labels, Education Class: Heart Healthy Eating, avoid processed foods and learn how to read food labels  Readiness to change: Preparation:  (Getting ready to change)       PSYCHOSOCIAL ASSESSMENT AND PLAN    Emotional:  Depression assessment:  PHQ-9 = 4 1-4 = Minimal Depression            Anxiety measure:  DON-7 = 0 0-4  = Not anxious  Self-reported stress level: 5   Social support: Patient reports excellent emotional/social support from daughters, granddaughter and sister and began going out more with friends 1x/week    Goals:  Reduce perceived stress to 1-3/10, improved [facility] QOL < 27, PHQ-9 - reduced severity by one level, Physical Fitness in [facility] Score < 3, Daily Activity in [facility] Score < 3, Social Activities in [facility] Score < 3, Overall Health in [facility] Score < 3, Quality of Life in [facility] include [facility] at the [facility] Score < 3 , Change in Health in [place] Score < 3 , Increased interest in doing things and increased energy  Education: benefits of a positive support system    Progressing:Pt is progressing and showing improvement  toward the following goals:  pt making an effort to go out and be social.  , Will continue to educate and progress as tolerated. Plan: Class: Stress and Your Health, Refer to [employer], Practice relaxation techniques, Exercise, Enjoy a hobby, Keep a positive mindset and Enjoy family  Readiness to change: Action:  (Changing behavior)      OTHER CORE COMPONENTS     Tobacco:   Social History     Tobacco Use   Smoking Status Former   • Packs/day: 0.50   • Years: 5.00   • Total pack years: 2.50   • Types: Cigarettes   • Start date:    • Quit date: 26   • Years since quittin.5   Smokeless Tobacco Never       Tobacco Use Intervention: Referral to tobacco expert:   N/A: Pt has a remote history of smoking    Blood pressure:    Restin/66 - 118/80    Goals: consistent BP < 130/80, reduced dietary sodium <2300mg, moderate intensity exercise >150 mins/wk and medication compliance  Education:  pathophysiology of pulmonary disease and environmental triggers  Progressing:Pt is progressing and showing improvement  toward the following goals:  normal resting BP.  , Will continue to educate and progress as tolerated.   Plan: Avoid Processed foods, engage in regular exercise, eliminate salt shaker at the table, use salt substitutes and check labels for sodium content  Readiness to change: Action:  (Changing behavior)

## 2023-07-11 ENCOUNTER — CLINICAL SUPPORT (OUTPATIENT)
Dept: PULMONOLOGY | Facility: CLINIC | Age: 78
End: 2023-07-11
Payer: MEDICARE

## 2023-07-11 DIAGNOSIS — I27.21 PAH (PULMONARY ARTERY HYPERTENSION) (HCC): Primary | ICD-10-CM

## 2023-07-11 PROCEDURE — G0239 OTH RESP PROC, GROUP: HCPCS

## 2023-07-13 ENCOUNTER — CLINICAL SUPPORT (OUTPATIENT)
Dept: PULMONOLOGY | Facility: CLINIC | Age: 78
End: 2023-07-13
Payer: MEDICARE

## 2023-07-13 DIAGNOSIS — I27.21 PAH (PULMONARY ARTERY HYPERTENSION) (HCC): Primary | ICD-10-CM

## 2023-07-13 PROCEDURE — G0239 OTH RESP PROC, GROUP: HCPCS

## 2023-07-18 ENCOUNTER — CLINICAL SUPPORT (OUTPATIENT)
Dept: PULMONOLOGY | Facility: CLINIC | Age: 78
End: 2023-07-18
Payer: MEDICARE

## 2023-07-18 DIAGNOSIS — I27.21 PAH (PULMONARY ARTERY HYPERTENSION) (HCC): Primary | ICD-10-CM

## 2023-07-18 PROCEDURE — G0239 OTH RESP PROC, GROUP: HCPCS

## 2023-07-20 ENCOUNTER — CLINICAL SUPPORT (OUTPATIENT)
Dept: PULMONOLOGY | Facility: CLINIC | Age: 78
End: 2023-07-20
Payer: MEDICARE

## 2023-07-20 DIAGNOSIS — I27.21 PAH (PULMONARY ARTERY HYPERTENSION) (HCC): Primary | ICD-10-CM

## 2023-07-20 PROCEDURE — G0239 OTH RESP PROC, GROUP: HCPCS

## 2023-07-25 ENCOUNTER — CLINICAL SUPPORT (OUTPATIENT)
Dept: PULMONOLOGY | Facility: CLINIC | Age: 78
End: 2023-07-25
Payer: MEDICARE

## 2023-07-25 DIAGNOSIS — I27.21 PAH (PULMONARY ARTERY HYPERTENSION) (HCC): Primary | ICD-10-CM

## 2023-07-25 PROCEDURE — G0239 OTH RESP PROC, GROUP: HCPCS

## 2023-07-27 ENCOUNTER — CLINICAL SUPPORT (OUTPATIENT)
Dept: PULMONOLOGY | Facility: CLINIC | Age: 78
End: 2023-07-27
Payer: MEDICARE

## 2023-07-27 DIAGNOSIS — I27.21 PAH (PULMONARY ARTERY HYPERTENSION) (HCC): Primary | ICD-10-CM

## 2023-07-27 PROCEDURE — G0239 OTH RESP PROC, GROUP: HCPCS

## 2023-08-01 ENCOUNTER — APPOINTMENT (OUTPATIENT)
Dept: PULMONOLOGY | Facility: CLINIC | Age: 78
End: 2023-08-01
Payer: MEDICARE

## 2023-08-02 ENCOUNTER — CLINICAL SUPPORT (OUTPATIENT)
Dept: PULMONOLOGY | Facility: CLINIC | Age: 78
End: 2023-08-02
Payer: MEDICARE

## 2023-08-02 DIAGNOSIS — I27.21 PAH (PULMONARY ARTERY HYPERTENSION) (HCC): Primary | ICD-10-CM

## 2023-08-02 PROCEDURE — G0239 OTH RESP PROC, GROUP: HCPCS

## 2023-08-02 NOTE — PROGRESS NOTES
Pulmonary Rehabilitation Plan of Care   60 Day Reassessment      Today's date: 2023   # of Exercise Sessions Completed: 13  Patient name: Sameer Bermudez      : 1945  Age: 69 y.o. MRN: [id number]  Referring Physician: Yesika Cruz DO  Pulmonologist: Taye Cotto MD; Cardiologist: Yesika Cruz DO  Provider: [facility]  Clinician: Darius Halsted, MS, CEP    Dx:   Encounter Diagnosis   Name Primary? • PAH (pulmonary artery hypertension) ([address]) Yes     Date of onset: 5/15/2023      SUMMARY OF PROGRESS:  Judah Merino is complaint with pulmonary rehab exercise 2 days/week. He is not responding, however, to increased supplemental O2 with activity. At home, Judah Merino has not been compliant with exercise but reports O2 drop to 63-68% when taking the garbage out (approx 30 yd) on 3L. In pulmonary rehab, Fredricks SpO2 drops to 82-87% when walking on 4L O2 and previously had dropped to 83-88% while walking on 3L O2. Today, O2 was increased to 6L while walking with SpO2 of 83-86% and 1/10 [place]. During other exercise modalities, Fredricks SpO2 drops to 87-93% with dyspnea rating of 1-3/10. At rest, Judah Merino reports 0-2/10 dyspnea with 92-96% SpO2 on 3-4 L O2. He has not had a follow up appt with pulmonology since 2023 and was encouraged to call and schedule an appointment. Judah Merino tolerates 40-42 min of exercise at 2-2.4 METs. He has a recumbent cycle at home and was walking more but has stopped doing that recently with minimal motivation. When addressed, Judah Merino admits to having depression/anxiety, but makes a very conscious effort to go out and be social with friends. He does report some difficulty with getting himself ready to go, but tries not to let that stop him. Judah Mreino also reports excellent social/emotional support from his daughters, granddaughter and sister. Patient goals include: get back into exercise routine.  Exercise intensity will continue to be progressed as tolerated, home exercise encouraged, and walking duration increased in the next 30 days of pulmonary rehab.       Medication compliance: Yes   Comments: Pt reports to be compliant with medications  Fall Risk: Low   Comments: experiences lightheadedness that has caused fall in the past few years ago; no falls since    Smoking: Former user  Was smoking socially for 3-4 years in early     RPD at Rest: 0-2/10  RPD with Exercise:  1-3/10    Assessment of progression of lung disease and functional status:  CAT:  initial  Shortness of breath questionnaire: 59/120 initial      EXERCISE ASSESSMENT and PLAN    Current Exercise Program in Rehab:       Frequency: 2 days/week       Minutes: 40-42         METS: 2-2.4              SpO2: 87-93% (83-87% walking)              RPD: 4-6                      HR: 75-97   RPE: 4-5         Modalities: UBE, NuStep, Recumbent bike and Room walking      Exercise Progression 30 Day Goals :    Frequency: 2 days/week    Supplement with home exercise 2+ days/wk as tolerated       Minutes: 40-45        METS: 2.0-2.6               SpO2: >90%              RPD: 4-6                      HR: RHR+30bpm   RPE: 4-5        Modalities: Treadmill, UBE, NuStep, Recumbent bike and Room walking     Strength trainin-3 days / week  12-15 repetitions  1-2 sets per modality   Will be added as tolerated   Modalities: Chest Press, Lateral Raise, Arm Extension, Arm Curl, Sit to [employer], [employer], Shoulder Shrugs and Calf Raises    Oxygen Needs: supplemental O2 via nasal cannula @ 3L/min at rest, supplemental O2 via nasal cannula @4-6L/min with exercise and O2 while sleeping   Oxygen Goal: Maintain SpO2>90% during exercise    Home Exercise: Type: recumbent cycle, Frequency: 2-3 days/week, Duration: 10-25 mins  Education: pursed lipped breathing, home exercise, benefits of exercise for pulmonary disease, RPD scale and appropriate O2 response to exercise    Goals: reduced score on  USCD Shortness of Breath Questionnaire, Improved 6MW distance by 10%, reduced dyspnea during exercise (0-3/10), improved exercise tolerance (max METs tolerated in pulmonary rehab), SpO2 >90% during exercise, reduced score on CAT, attend pulmonary rehab regularly, decrease sitting time at home and start a walking program  Progressing:  Pt is progressing and showing improvement  toward the following goals:  attend pulmonary rehab regularly,.  , Pt has not made progress toward the following goals: SpO2 >90% during exercise. , Will continue to educate and progress as tolerated. Plan: Titrate supplemental oxygen as needed to maintain SpO2>90% with exercise, learn to conserve energy with ADLs , practice breathing techniques 3x/day and reduce time sitting at home    Readiness to change: Action:  (Changing behavior)      NUTRITION ASSESSMENT AND PLAN    Weight control:    Starting weight: 161.6 lb   Current weight:   161.6 lb    Diabetes: N/A    Goals:Improved Rate Your Plate score  >39, eliminate processed meats, increase intake of fish, shellfish, use low fat dairy, choose low sodium processed foods, eliminate butter and daily saturated fat intake <7%/13g  Education: hydration  Progressing:Pt has not made progress toward the following goals: no changes reported at this time. , Will continue to educate and progress as tolerated.   Plan: Education class: Reading Food Labels, Education Class: Heart Healthy Eating, avoid processed foods and learn how to read food labels  Readiness to change: Pre-Contemplation:   (Not yet acknowledging that there is a problem behavior that needs to change)      PSYCHOSOCIAL ASSESSMENT AND PLAN    Emotional:  Depression assessment:  PHQ-9 = 4 1-4 = Minimal Depression            Anxiety measure:  DON-7 = 0 0-4  = Not anxious  Self-reported stress level: 5   Social support: Patient reports excellent emotional/social support from daughters, granddaughter and sister and began going out more with friends 1x/week    Goals:  Reduce perceived stress to 1-3/10, improved [facility] QOL < 27, PHQ-9 - reduced severity by one level, Physical Fitness in [facility] Score < 3, Daily Activity in [facility] Score < 3, Social Activities in [facility] Score < 3, Overall Health in [facility] Score < 3, Quality of Life in [facility] Score < 3 , Change in Health in [place] Score < 3 , Increased interest in doing things and increased energy  Education: benefits of a positive support system    Progressing:Pt is progressing and showing improvement  toward the following goals:  pt making an effort to go out and be social.  , Will continue to educate and progress as tolerated. Plan: Class: Stress and Your Health, Refer to [address], Practice relaxation techniques, Exercise, Enjoy a hobby, Keep a positive mindset and Enjoy family  Readiness to change: Action:  (Changing behavior)      OTHER CORE COMPONENTS     Tobacco:   Social History     Tobacco Use   Smoking Status Former   • Packs/day: 0.50   • Years: 5.00   • Total pack years: 2.50   • Types: Cigarettes   • Start date:    • Quit date: 26   • Years since quittin.6   Smokeless Tobacco Never       Tobacco Use Intervention: Referral to tobacco expert:   N/A: Pt has a remote history of smoking    Blood pressure:    Restin/60 - 118/66    Goals: consistent BP < 130/80, moderate intensity exercise >150 mins/wk and medication compliance  Education:  pathophysiology of pulmonary disease and environmental triggers  Progressing:Pt is progressing and showing improvement  toward the following goals:  normal resting BP.  , Will continue to educate and progress as tolerated.   Plan: Avoid Processed foods, engage in regular exercise, eliminate salt shaker at the table, use salt substitutes and check labels for sodium content  Readiness to change: Action:  (Changing behavior)

## 2023-08-03 ENCOUNTER — CLINICAL SUPPORT (OUTPATIENT)
Dept: PULMONOLOGY | Facility: CLINIC | Age: 78
End: 2023-08-03
Payer: MEDICARE

## 2023-08-03 DIAGNOSIS — I27.21 PAH (PULMONARY ARTERY HYPERTENSION) (HCC): Primary | ICD-10-CM

## 2023-08-03 PROCEDURE — G0239 OTH RESP PROC, GROUP: HCPCS

## 2023-08-08 ENCOUNTER — CLINICAL SUPPORT (OUTPATIENT)
Dept: PULMONOLOGY | Facility: CLINIC | Age: 78
End: 2023-08-08
Payer: MEDICARE

## 2023-08-08 DIAGNOSIS — I27.21 PAH (PULMONARY ARTERY HYPERTENSION) (HCC): Primary | ICD-10-CM

## 2023-08-08 PROCEDURE — G0239 OTH RESP PROC, GROUP: HCPCS

## 2023-08-10 ENCOUNTER — CLINICAL SUPPORT (OUTPATIENT)
Dept: PULMONOLOGY | Facility: CLINIC | Age: 78
End: 2023-08-10
Payer: MEDICARE

## 2023-08-10 DIAGNOSIS — I27.21 PAH (PULMONARY ARTERY HYPERTENSION) (HCC): Primary | ICD-10-CM

## 2023-08-10 PROCEDURE — G0239 OTH RESP PROC, GROUP: HCPCS

## 2023-08-14 ENCOUNTER — CLINICAL SUPPORT (OUTPATIENT)
Dept: PULMONOLOGY | Facility: CLINIC | Age: 78
End: 2023-08-14
Payer: MEDICARE

## 2023-08-14 DIAGNOSIS — I27.21 PAH (PULMONARY ARTERY HYPERTENSION) (HCC): Primary | ICD-10-CM

## 2023-08-14 PROCEDURE — G0239 OTH RESP PROC, GROUP: HCPCS

## 2023-08-15 ENCOUNTER — APPOINTMENT (OUTPATIENT)
Dept: PULMONOLOGY | Facility: CLINIC | Age: 78
End: 2023-08-15
Payer: MEDICARE

## 2023-08-16 ENCOUNTER — CLINICAL SUPPORT (OUTPATIENT)
Dept: PULMONOLOGY | Facility: CLINIC | Age: 78
End: 2023-08-16
Payer: MEDICARE

## 2023-08-16 DIAGNOSIS — I27.21 PAH (PULMONARY ARTERY HYPERTENSION) (HCC): Primary | ICD-10-CM

## 2023-08-16 PROCEDURE — G0239 OTH RESP PROC, GROUP: HCPCS

## 2023-08-17 ENCOUNTER — APPOINTMENT (OUTPATIENT)
Dept: PULMONOLOGY | Facility: CLINIC | Age: 78
End: 2023-08-17
Payer: MEDICARE

## 2023-08-22 ENCOUNTER — CLINICAL SUPPORT (OUTPATIENT)
Dept: PULMONOLOGY | Facility: CLINIC | Age: 78
End: 2023-08-22
Payer: MEDICARE

## 2023-08-22 DIAGNOSIS — I27.21 PAH (PULMONARY ARTERY HYPERTENSION) (HCC): Primary | ICD-10-CM

## 2023-08-22 PROCEDURE — G0239 OTH RESP PROC, GROUP: HCPCS

## 2023-08-24 ENCOUNTER — CLINICAL SUPPORT (OUTPATIENT)
Dept: PULMONOLOGY | Facility: CLINIC | Age: 78
End: 2023-08-24
Payer: MEDICARE

## 2023-08-24 DIAGNOSIS — I27.21 PAH (PULMONARY ARTERY HYPERTENSION) (HCC): Primary | ICD-10-CM

## 2023-08-24 PROCEDURE — G0239 OTH RESP PROC, GROUP: HCPCS

## 2023-08-24 NOTE — PROGRESS NOTES
Pulmonary Rehabilitation Plan of Care   90 Day Reassessment      Today's date: 2023   # of Exercise Sessions Completed: 20  Patient name: Ngozi Espitia      : 1945  Age: 69 y.o. MRN: [id number]  Referring Physician: Brianna Perez DO  Pulmonologist: Huyen Flor MD; Cardiologist: Brianna Perez DO  [facility]  Clinician: Charles Jimenez MS, CEP    Dx:   Encounter Diagnosis   Name Primary? • PAH (pulmonary artery hypertension) ([address]) Yes     Date of onset: 5/15/2023      SUMMARY OF PROGRESS: Patricio Reardon continues to be compliant with pulmonary rehab 2 days/week. He tolerates 40 min of exercise at 2-2.6 METs including an increased focus on room walking. Resting HR 65-68 bpm with increased HR with exercise to  bpm. Normal resting /70 - 118/64. Patricio Reardon denies dyspnea at rest on 4 L and reports increased dyspnea with exercise to 1-3/10 and slightly more (2-4/10) while walking. Resting SpO2 95-98% on 4L supplemental O2 with decreased SpO2 to 92-97% on exercise modalities and 4 L O2. While room walking, Shan's SpO2 drops to 86-91% on 6 L supplemental O2. He has not been consistent with home exercise and continues to struggle with the motivation to move. In the next week, he will try to schedule a time for exercise to hold himself accountable. Patricio Reardon has been feeling better recently with less depression and anxiety. He has maintained his weight at 160-161 lbs and reports making healthy diet choices. He will get prepared meals at the [employer] and add fresh vegetables to the meals. His daughter will also cook for him on occasion. Patient goals include: get back into exercise routine. Exercise intensity will continue to be progressed as tolerated, home exercise encouraged, and walking duration increased in the next 30 days of pulmonary rehab.       23: Patricio Reardon is complaint with pulmonary rehab exercise 2 days/week. He is not responding, however, to increased supplemental O2 with activity.  At home, Ever Singh has not been compliant with exercise but reports O2 drop to 63-68% when taking the garbage out (approx 30 yd) on 3L. In pulmonary rehab, Shan's SpO2 drops to 82-87% when walking on 4L O2 and previously had dropped to 83-88% while walking on 3L O2. Today, O2 was increased to 6L while walking with SpO2 of 83-86% and 1/10 [place]. During other exercise modalities, Fredricks SpO2 drops to 87-93% with dyspnea rating of 1-3/10. At rest, Ever Singh reports 0-2/10 dyspnea with 92-96% SpO2 on 3-4 L O2. He has not had a follow up appt with pulmonology since 2/2023 and was encouraged to call and schedule an appointment. Ever Singh tolerates 40-42 min of exercise at 2-2.4 METs. He has a recumbent cycle at home and was walking more but has stopped doing that recently with minimal motivation. When addressed, Ever Singh admits to having depression/anxiety, but makes a very conscious effort to go out and be social with friends. He does report some difficulty with getting himself ready to go, but tries not to let that stop him. Ever Singh also reports excellent social/emotional support from his daughters, granddaughter and sister. Patient goals include: get back into exercise routine. Exercise intensity will continue to be progressed as tolerated, home exercise encouraged, and walking duration increased in the next 30 days of pulmonary rehab.       Medication compliance: Yes   Comments: Pt reports to be compliant with medications  Fall Risk: Low   Comments: experiences lightheadedness that has caused fall in the past few years ago; no falls since    Smoking: Former user  Was smoking socially for 3-4 years in early 25s    RPD at Rest: 0/10  RPD with Exercise:  1-4/10    Assessment of progression of lung disease and functional status:  CAT: 25/40 initial  Shortness of breath questionnaire: 59/120 initial      EXERCISE ASSESSMENT and PLAN    Current Exercise Program in Rehab:       Frequency: 2 days/week      Minutes: 40        METS: 2-2.6              SpO2: 92-97% (86-92% walking)              RPD: 2-4                      HR:    RPE: 4-5         Modalities: UBE, NuStep, Recumbent bike and Room walking      Exercise Progression 30 Day Goals :    Frequency: 2 days/week    Supplement with home exercise 2+ days/wk as tolerated       Minutes: 40-45        METS: 2.4-3.0               SpO2: >90%              RPD: 4-6                      HR: RHR+30bpm   RPE: 4-5        Modalities: Treadmill, UBE, NuStep, Recumbent bike and Room walking     Strength trainin-3 days / week  12-15 repetitions  1-2 sets per modality   Will be added as tolerated   Modalities: Chest Press, Lateral Raise, Arm Extension, Arm Curl, Sit to [employer], Shoulder Shrugs and Calf Raises    Oxygen Needs: supplemental O2 via nasal cannula @ 3L/min at rest, supplemental O2 via nasal cannula @4-6L/min with exercise and O2 while sleeping   Oxygen Goal: Maintain SpO2>90% during exercise    Home Exercise: Type: recumbent cycle, Frequency: 2-3 days/week, Duration: 10-25 mins  Education: pursed lipped breathing, home exercise, benefits of exercise for pulmonary disease, RPD scale and appropriate O2 response to exercise    Goals: reduced score on  USCD Shortness of Breath Questionnaire, Improved 6MW distance by 10%, reduced dyspnea during exercise (0-3/10), improved exercise tolerance (max METs tolerated in pulmonary rehab), SpO2 >90% during exercise, reduced score on CAT, attend pulmonary rehab regularly, decrease sitting time at home and start a walking program  Progressing:  Pt is progressing and showing improvement  toward the following goals:  attend pulmonary rehab regularly, improved SpO2 while walking with increased supplemental O2.  , Pt has not made progress toward the following goals: has not yet started weight training with focus on walking. , Will continue to educate and progress as tolerated.     Plan: Titrate supplemental oxygen as needed to maintain SpO2>90% with exercise, learn to conserve energy with ADLs , practice breathing techniques 3x/day and reduce time sitting at home    Readiness to change: Action:  (Changing behavior)      NUTRITION ASSESSMENT AND PLAN    Weight control:    Starting weight: 161.6 lb   Current weight:   160.4 lb    Diabetes: N/A    Goals:Improved Rate Your Plate score  >42, eliminate processed meats, increase intake of fish, shellfish, use low fat dairy, choose low sodium processed foods, eliminate butter and daily saturated fat intake <7%/13g  Education: hydration  Progressing:Pt is progressing and showing improvement  toward the following goals:  Pt maintaining his weight, also reports making healthy food choices with increased intake of fresh vegetables. , Will continue to educate and progress as tolerated.   Plan: Education class: Reading Food Labels, Education Class: Heart Healthy Eating, avoid processed foods and learn how to read food labels  Readiness to change: Action:  (Changing behavior)      PSYCHOSOCIAL ASSESSMENT AND PLAN    Emotional:  Depression assessment:  PHQ-9 = 4 1-4 = Minimal Depression            Anxiety measure:  DON-7 = 0 0-4  = Not anxious  Self-reported stress level: 5   Social support: Patient reports excellent emotional/social support from daughters, granddaughter and sister and began going out more with friends 1x/week    Goals:  Reduce perceived stress to 1-3/10, improved [facility] QOL < 27, PHQ-9 - reduced severity by one level, Physical Fitness in [facility] Score < 3, Daily Activity in [place] Score < 3, Social Activities in [place] Score < 3, Overall Health in [facility] Score < 3, Quality of Life in [facility] Score < 3 , Change in Health in [facility] Score < 3 , Increased interest in doing things and increased energy  Education: benefits of a positive support system    Progressing:Pt is progressing and showing improvement  toward the following goals:  improvement reported in depression and anxiety.  , Will continue to educate and progress as tolerated. Plan: Class: Stress and Your Health, Refer to [employer], Practice relaxation techniques, Exercise, Enjoy a hobby, Keep a positive mindset and Enjoy family  Readiness to change: Action:  (Changing behavior)      OTHER CORE COMPONENTS     Tobacco:   Social History     Tobacco Use   Smoking Status Former   • Packs/day: 0.50   • Years: 5.00   • Total pack years: 2.50   • Types: Cigarettes   • Start date:    • Quit date: 26   • Years since quittin.6   Smokeless Tobacco Never       Tobacco Use Intervention: Referral to tobacco expert:   N/A: Pt has a remote history of smoking    Blood pressure:    Restin/70 - 118/64    Goals: consistent BP < 130/80, moderate intensity exercise >150 mins/wk and medication compliance  Education:  pathophysiology of pulmonary disease and environmental triggers  Progressing:Pt is progressing and showing improvement  toward the following goals:  normal resting BP, medication compliance, increased duration of home exercise.  , Will continue to educate and progress as tolerated.   Plan: Avoid Processed foods, engage in regular exercise, eliminate salt shaker at the table, use salt substitutes and check labels for sodium content  Readiness to change: Action:  (Changing behavior)

## 2023-08-27 DIAGNOSIS — G47.00 INSOMNIA, UNSPECIFIED TYPE: ICD-10-CM

## 2023-08-28 RX ORDER — TEMAZEPAM 30 MG/1
CAPSULE ORAL
Qty: 30 CAPSULE | Refills: 4 | OUTPATIENT
Start: 2023-08-28

## 2023-08-28 NOTE — TELEPHONE ENCOUNTER
Medication refused, patient has not been in the office for over a year and needs a physical.We need to evaluate and follow up on chronic conditions

## 2023-08-29 ENCOUNTER — CLINICAL SUPPORT (OUTPATIENT)
Dept: PULMONOLOGY | Facility: CLINIC | Age: 78
End: 2023-08-29
Payer: MEDICARE

## 2023-08-29 DIAGNOSIS — I27.21 PAH (PULMONARY ARTERY HYPERTENSION) (HCC): Primary | ICD-10-CM

## 2023-08-29 PROCEDURE — G0239 OTH RESP PROC, GROUP: HCPCS

## 2023-08-31 ENCOUNTER — CLINICAL SUPPORT (OUTPATIENT)
Dept: PULMONOLOGY | Facility: CLINIC | Age: 78
End: 2023-08-31
Payer: MEDICARE

## 2023-08-31 DIAGNOSIS — I27.21 PAH (PULMONARY ARTERY HYPERTENSION) (HCC): Primary | ICD-10-CM

## 2023-08-31 PROCEDURE — G0239 OTH RESP PROC, GROUP: HCPCS

## 2023-09-01 ENCOUNTER — OFFICE VISIT (OUTPATIENT)
Dept: FAMILY MEDICINE CLINIC | Facility: CLINIC | Age: 78
End: 2023-09-01
Payer: MEDICARE

## 2023-09-01 VITALS
BODY MASS INDEX: 18.92 KG/M2 | WEIGHT: 160.2 LBS | HEIGHT: 77 IN | DIASTOLIC BLOOD PRESSURE: 80 MMHG | SYSTOLIC BLOOD PRESSURE: 128 MMHG | TEMPERATURE: 97.2 F | OXYGEN SATURATION: 89 % | HEART RATE: 84 BPM

## 2023-09-01 DIAGNOSIS — Z00.00 MEDICARE ANNUAL WELLNESS VISIT, SUBSEQUENT: Primary | ICD-10-CM

## 2023-09-01 DIAGNOSIS — E44.1 MILD PROTEIN-CALORIE MALNUTRITION (HCC): ICD-10-CM

## 2023-09-01 DIAGNOSIS — J96.11 CHRONIC HYPOXEMIC RESPIRATORY FAILURE (HCC): ICD-10-CM

## 2023-09-01 DIAGNOSIS — N40.1 BPH WITH OBSTRUCTION/LOWER URINARY TRACT SYMPTOMS: ICD-10-CM

## 2023-09-01 DIAGNOSIS — I27.20 PULMONARY HYPERTENSION (HCC): ICD-10-CM

## 2023-09-01 DIAGNOSIS — F51.01 PRIMARY INSOMNIA: Chronic | ICD-10-CM

## 2023-09-01 DIAGNOSIS — N13.8 BPH WITH OBSTRUCTION/LOWER URINARY TRACT SYMPTOMS: ICD-10-CM

## 2023-09-01 DIAGNOSIS — G47.00 INSOMNIA, UNSPECIFIED TYPE: ICD-10-CM

## 2023-09-01 PROCEDURE — G0439 PPPS, SUBSEQ VISIT: HCPCS

## 2023-09-01 PROCEDURE — G0444 DEPRESSION SCREEN ANNUAL: HCPCS

## 2023-09-01 PROCEDURE — 99214 OFFICE O/P EST MOD 30 MIN: CPT

## 2023-09-01 RX ORDER — TEMAZEPAM 30 MG/1
30 CAPSULE ORAL
Qty: 30 CAPSULE | Refills: 0 | Status: SHIPPED | OUTPATIENT
Start: 2023-09-01

## 2023-09-01 NOTE — PATIENT INSTRUCTIONS
Medicare Preventive Visit Patient Instructions  Thank you for completing your Welcome to Medicare Visit or Medicare Annual Wellness Visit today. Your next wellness visit will be due in one year (9/1/2024). The screening/preventive services that you may require over the next 5-10 years are detailed below. Some tests may not apply to you based off risk factors and/or age. Screening tests ordered at today's visit but not completed yet may show as past due. Also, please note that scanned in results may not display below. Preventive Screenings:  Service Recommendations Previous Testing/Comments   Colorectal Cancer Screening  · Colonoscopy    · Fecal Occult Blood Test (FOBT)/Fecal Immunochemical Test (FIT)  · Fecal DNA/Cologuard Test  · Flexible Sigmoidoscopy Age: 43-73 years old   Colonoscopy: every 10 years (May be performed more frequently if at higher risk)  OR  FOBT/FIT: every 1 year  OR  Cologuard: every 3 years  OR  Sigmoidoscopy: every 5 years  Screening may be recommended earlier than age 39 if at higher risk for colorectal cancer. Also, an individualized decision between you and your healthcare provider will decide whether screening between the ages of 77-80 would be appropriate.  Colonoscopy: 06/29/2021  FOBT/FIT: Not on file  Cologuard: Not on file  Sigmoidoscopy: Not on file          Prostate Cancer Screening Individualized decision between patient and health care provider in men between ages of 53-66   Medicare will cover every 12 months beginning on the day after your 50th birthday PSA: 2.5 ng/mL     Screening Not Indicated     Hepatitis C Screening Once for adults born between 1945 and 1965  More frequently in patients at high risk for Hepatitis C Hep C Antibody: 06/03/2020    Screening Current   Diabetes Screening 1-2 times per year if you're at risk for diabetes or have pre-diabetes Fasting glucose: 115 mg/dL (2/26/2023)  A1C: No results in last 5 years (No results in last 5 years)  Screening Current Cholesterol Screening Once every 5 years if you don't have a lipid disorder. May order more often based on risk factors. Lipid panel: 12/03/2021  Screening Current      Other Preventive Screenings Covered by Medicare:  1. Abdominal Aortic Aneurysm (AAA) Screening: covered once if your at risk. You're considered to be at risk if you have a family history of AAA or a male between the age of 70-76 who smoking at least 100 cigarettes in your lifetime. 2. Lung Cancer Screening: covers low dose CT scan once per year if you meet all of the following conditions: (1) Age 48-67; (2) No signs or symptoms of lung cancer; (3) Current smoker or have quit smoking within the last 15 years; (4) You have a tobacco smoking history of at least 20 pack years (packs per day x number of years you smoked); (5) You get a written order from a healthcare provider. 3. Glaucoma Screening: covered annually if you're considered high risk: (1) You have diabetes OR (2) Family history of glaucoma OR (3)  aged 48 and older OR (3)  American aged 72 and older  3. Osteoporosis Screening: covered every 2 years if you meet one of the following conditions: (1) Have a vertebral abnormality; (2) On glucocorticoid therapy for more than 3 months; (3) Have primary hyperparathyroidism; (4) On osteoporosis medications and need to assess response to drug therapy. 5. HIV Screening: covered annually if you're between the age of 14-79. Also covered annually if you are younger than 13 and older than 72 with risk factors for HIV infection. For pregnant patients, it is covered up to 3 times per pregnancy.     Immunizations:  Immunization Recommendations   Influenza Vaccine Annual influenza vaccination during flu season is recommended for all persons aged >= 6 months who do not have contraindications   Pneumococcal Vaccine   * Pneumococcal conjugate vaccine = PCV13 (Prevnar 13), PCV15 (Vaxneuvance), PCV20 (Prevnar 20)  * Pneumococcal polysaccharide vaccine = PPSV23 (Pneumovax) Adults 2364 years old: 1-3 doses may be recommended based on certain risk factors  Adults 72 years old: 1-2 doses may be recommended based off what pneumonia vaccine you previously received   Hepatitis B Vaccine 3 dose series if at intermediate or high risk (ex: diabetes, end stage renal disease, liver disease)   Tetanus (Td) Vaccine - COST NOT COVERED BY MEDICARE PART B Following completion of primary series, a booster dose should be given every 10 years to maintain immunity against tetanus. Td may also be given as tetanus wound prophylaxis. Tdap Vaccine - COST NOT COVERED BY MEDICARE PART B Recommended at least once for all adults. For pregnant patients, recommended with each pregnancy. Shingles Vaccine (Shingrix) - COST NOT COVERED BY MEDICARE PART B  2 shot series recommended in those aged 48 and above     Health Maintenance Due:      Topic Date Due   • Hepatitis C Screening  Completed   • Colorectal Cancer Screening  Discontinued     Immunizations Due:      Topic Date Due   • Influenza Vaccine (1) 09/01/2023     Advance Directives   What are advance directives? Advance directives are legal documents that state your wishes and plans for medical care. These plans are made ahead of time in case you lose your ability to make decisions for yourself. Advance directives can apply to any medical decision, such as the treatments you want, and if you want to donate organs. What are the types of advance directives? There are many types of advance directives, and each state has rules about how to use them. You may choose a combination of any of the following:  · Living will: This is a written record of the treatment you want. You can also choose which treatments you do not want, which to limit, and which to stop at a certain time. This includes surgery, medicine, IV fluid, and tube feedings. · Durable power of  for healthcare [facility]):   This is a written record that states who you want to make healthcare choices for you when you are unable to make them for yourself. This person, called a proxy, is usually a family member or a friend. You may choose more than 1 proxy. · Do not resuscitate (DNR) order:  A DNR order is used in case your heart stops beating or you stop breathing. It is a request not to have certain forms of treatment, such as CPR. A DNR order may be included in other types of advance directives. · Medical directive: This covers the care that you want if you are in a coma, near death, or unable to make decisions for yourself. You can list the treatments you want for each condition. Treatment may include pain medicine, surgery, blood transfusions, dialysis, IV or tube feedings, and a ventilator (breathing machine). · Values history: This document has questions about your views, beliefs, and how you feel and think about life. This information can help others choose the care that you would choose. Why are advance directives important? An advance directive helps you control your care. Although spoken wishes may be used, it is better to have your wishes written down. Spoken wishes can be misunderstood, or not followed. Treatments may be given even if you do not want them. An advance directive may make it easier for your family to make difficult choices about your care. Fall Prevention    Fall prevention  includes ways to make your home and other areas safer. It also includes ways you can move more carefully to prevent a fall. Health conditions that cause changes in your blood pressure, vision, or muscle strength and coordination may increase your risk for falls. Medicines may also increase your risk for falls if they make you dizzy, weak, or sleepy. Fall prevention tips:   · Stand or sit up slowly. · Use assistive devices as directed. · Wear shoes that fit well and have soles that . · Wear a personal alarm. · Stay active. · Manage your medical conditions. Home Safety Tips:  · Add items to prevent falls in the bathroom. · Keep paths clear. · Install bright lights in your home. · Keep items you use often on shelves within reach. · Paint or place reflective tape on the edges of your stairs. © Copyright [employer] 2018 Information is for End User's use only and may not be sold, redistributed or otherwise used for commercial purposes.  All illustrations and images included in CareNotes® are the copyrighted property of [employer] or  [employer]

## 2023-09-01 NOTE — PROGRESS NOTES
Assessment and Plan:     Problem List Items Addressed This Visit        Respiratory    Chronic hypoxemic respiratory failure ([address])       Cardiovascular and Mediastinum    Pulmonary hypertension ([address])     Follows with pulmonology    Home 02   In pulmonary rehab             Genitourinary    BPH with obstruction/lower urinary tract symptoms     Tamsulosin 0.4mg   Finasteride 5mg             Other    Primary insomnia (Chronic)     Well controlled on temazepam 30mg          Mild protein-calorie malnutrition (HCC)     Fluoxetine 20mg           Other Visit Diagnoses     Medicare annual wellness visit, subsequent    -  Primary    Insomnia, unspecified type        Relevant Medications    temazepam (RESTORIL) 30 mg capsule          Depression Screening and Follow-up Plan: Patient was screened for depression during today's encounter. They screened negative with a PHQ-2 score of 0. Preventive health issues were discussed with patient, and age appropriate screening tests were ordered as noted in patient's After Visit Summary. Personalized health advice and appropriate referrals for health education or preventive services given if needed, as noted in patient's After Visit Summary. History of Present Illness:     Patient presents for a Medicare Wellness Visit    AWV     Patient Care Team:  Felicity Bender MD as PCP - General (Family Medicine)     Review of Systems:     Review of Systems   Constitutional: Negative for activity change, chills, fatigue and fever. HENT: Negative for congestion, ear pain, rhinorrhea and sore throat. Eyes: Negative for pain and visual disturbance. Respiratory: Negative for cough, chest tightness and shortness of breath. Cardiovascular: Negative for chest pain, palpitations and leg swelling. Gastrointestinal: Negative for abdominal pain, constipation, diarrhea, nausea and vomiting. Genitourinary: Negative for decreased urine volume, dysuria, frequency, hematuria and urgency. Musculoskeletal: Negative for arthralgias and back pain. Skin: Negative for color change and rash. Neurological: Negative for seizures, syncope and headaches. Psychiatric/Behavioral: Negative for dysphoric mood. The patient is not nervous/anxious. All other systems reviewed and are negative. Problem List:     Patient Active Problem List   Diagnosis   • BPH with obstruction/lower urinary tract symptoms   • Combined arterial insufficiency and corporo-venous occlusive erectile dysfunction   • Other hemorrhoids   • Cough   • Herpes simplex infection of penis   • Medicare annual wellness visit, initial   • Left anterior hemiblock   • Shoulder pain   • Dry nose   • Dermatitis   • COVID-19   • ROSALINA (obstructive sleep apnea)   • ILD (interstitial lung disease) ([address])   • Pulmonary hypertension (HCC)   • Primary insomnia   • Abnormal weight loss   • Counseling regarding advanced care planning and goals of care   • Other specified anemias   • Dyspnea on minimal exertion   • Ambulatory dysfunction   • Chronic hypoxemic respiratory failure (HCC)   • Mild protein-calorie malnutrition ([address])      Past Medical and Surgical History:     Past Medical History:   Diagnosis Date   • Bifascicular block 3/8/2020    Asx: discovered on Ekg done 3/20 in eval of chest pain. • BPH with obstruction/lower urinary tract symptoms    • BPH with urinary obstruction    • 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    • Erectile dysfunction      Past Surgical History:   Procedure Laterality Date   • CARDIAC CATHETERIZATION N/A 10/18/2022    Procedure: Cardiac RHC; Surgeon: Mackenzie Aguero, DO;  Location: [facility];   Service: Cardiology   • COLONOSCOPY     • SKIN BIOPSY        Family History:     Family History   Family history unknown: Yes      Social History:     Social History     Socioeconomic History   • Marital status: /Civil Union     Spouse name: None   • Number of children: None   • Years of education: None   • Highest education level: None   Occupational History   • None   Tobacco Use   • Smoking status: Former     Packs/day: 0.50     Years: 5.00     Total pack years: 2.50     Types: Cigarettes     Start date: 1     Quit date: 26     Years since quittin.7   • Smokeless tobacco: Never   Vaping Use   • Vaping Use: Never used   Substance and Sexual Activity   • Alcohol use: Not Currently     Alcohol/week: 12.0 standard drinks of alcohol     Types: 12 Cans of beer per week     Comment: socially   • Drug use: No   • Sexual activity: Not Currently   Other Topics Concern   • None   Social History Narrative   • None     Social Determinants of Health     Financial Resource Strain: Low Risk  (2023)    Overall Financial Resource Strain (CARDIA)    • Difficulty of Paying Living Expenses: Not very hard   Food Insecurity: Not on file   Transportation Needs: No Transportation Needs (2023)    PRAPARE - Transportation    • Lack of Transportation (Medical): No    • Lack of Transportation (Non-Medical):  No   Physical Activity: Not on file   Stress: Not on file   Social Connections: Not on file   Intimate Partner Violence: Not on file   Housing Stability: Not on file      Medications and Allergies:     Current Outpatient Medications   Medication Sig Dispense Refill   • ciclopirox (LOPROX) 0.77 % cream PLEASE SEE ATTACHED FOR DETAILED DIRECTIONS     • finasteride (PROSCAR) 5 mg tablet TAKE 1 TABLET BY MOUTH EVERY DAY 90 tablet 3   • FLUoxetine (PROzac) 20 mg capsule TAKE 1 CAPSULE BY MOUTH EVERY DAY 90 capsule 2   • fluticasone (FLONASE) 50 mcg/act nasal spray 2 sprays into each nostril daily 48 mL 3   • megestrol (MEGACE ES) 625 MG/5ML suspension Take 5 mL (625 mg total) by mouth daily 450 mL 3   • Multiple Vitamin (MULTI-DAY VITAMINS) TABS Take 1 tablet by mouth daily     • nintedanib esylate (OFEV) 150 MG CAPS capsule Take 1 capsule (150 mg total) by mouth 2 (two) times a day 60 capsule 11   • tamsulosin (FLOMAX) 0.4 mg TAKE 2 CAPSULES BY MOUTH DAILY WITH DINNER. . 180 capsule 3   • temazepam (RESTORIL) 30 mg capsule Take 1 capsule (30 mg total) by mouth daily at bedtime 30 capsule 0   • Treprostinil 64 MCG POWD Inhale 64 mcg 4 (four) times a day 112 each 11   • fexofenadine (ALLEGRA) 180 MG tablet Take 1 tablet (180 mg total) by mouth daily 30 tablet 3     No current facility-administered medications for this visit. No Known Allergies   Immunizations:     Immunization History   Administered Date(s) Administered   • COVID-19 MODERNA VACC 0.5 ML IM 01/27/2021, 03/04/2021, 11/16/2021   • COVID-19 Moderna Vac BIVALENT 12 Yr+ IM (BOOSTER ONLY) 0.5 ML 09/26/2022, 06/13/2023   • INFLUENZA 10/24/2022   • Influenza, high dose seasonal 0.7 mL 10/13/2020   • Pneumococcal Conjugate 13-Valent 03/31/2015   • Pneumococcal Polysaccharide PPV23 03/06/2019   • Tdap 11/03/2009, 03/10/2020   • Zoster Vaccine Recombinant 11/10/2020      Health Maintenance:         Topic Date Due   • Hepatitis C Screening  Completed   • Colorectal Cancer Screening  Discontinued         Topic Date Due   • Influenza Vaccine (1) 09/01/2023      Medicare Screening Tests and Risk Assessments:     Bharti Karimi is here for his Subsequent Wellness visit. Last Medicare Wellness visit information reviewed, patient interviewed and updates made to the record today. Health Risk Assessment:   Patient rates overall health as poor. Patient feels that their physical health rating is slightly worse. Patient is satisfied with their life. Eyesight was rated as same. Hearing was rated as same. Patient feels that their emotional and mental health rating is slightly better. Patients states they are never, rarely angry. Patient states they are never, rarely unusually tired/fatigued. Pain experienced in the last 7 days has been none. Patient states that he has experienced weight loss or gain in last 6 months.      Depression Screening:   PHQ-2 Score: 0      Fall Risk Screening: In the past year, patient has experienced: history of falling in past year    Number of falls: 1  Injured during fall?: No    Feels unsteady when standing or walking?: No    Worried about falling?: Yes      Home Safety:  Patient does not have trouble with stairs inside or outside of their home. Patient has working smoke alarms and has working carbon monoxide detector. Home safety hazards include: none. Nutrition:   Current diet is Regular. Medications:   Patient is not currently taking any over-the-counter supplements. Patient is able to manage medications. Activities of Daily Living (ADLs)/Instrumental Activities of Daily Living (IADLs):   Walk and transfer into and out of bed and chair?: Yes  Dress and groom yourself?: Yes    Bathe or shower yourself?: Yes    Feed yourself? Yes  Do your laundry/housekeeping?: Yes  Manage your money, pay your bills and track your expenses?: Yes  Make your own meals?: Yes    Do your own shopping?: Yes    Previous Hospitalizations:   Any hospitalizations or ED visits within the last 12 months?: No      Advance Care Planning:   Living will: Yes    Advanced directive: Yes      PREVENTIVE SCREENINGS      Cardiovascular Screening:    General: Screening Current      Diabetes Screening:     General: Screening Current      Prostate Cancer Screening:    General: Screening Not Indicated      Abdominal Aortic Aneurysm (AAA) Screening:    Risk factors include: tobacco use        Lung Cancer Screening:     General: Screening Not Indicated      Hepatitis C Screening:    General: Screening Current    Screening, Brief Intervention, and Referral to Treatment (SBIRT)    Screening  Typical number of drinks in a day: 0  Typical number of drinks in a week: 0  Interpretation: Low risk drinking behavior. Time Spent  Time spent screening/evaluating the patient for alcohol misuse: 0 minutes.  Time spent providing alcohol/substance abuse assessment and intervention services: 0 minutes. Annual Depression Screening  Time spent screening and evaluating the patient for depression during today's encounter was 5 minutes. Other Counseling Topics:   Car/seat belt/driving safety, skin self-exam, sunscreen and regular weightbearing exercise and calcium and vitamin D intake. No results found. Physical Exam:     /80 (BP Location: Left arm, Patient Position: Sitting, Cuff Size: Standard)   Pulse 84   Temp (!) 97.2 °F (36.2 °C) (Temporal)   Ht 6' 5" (1.956 m)   Wt 72.7 kg (160 lb 3.2 oz)   SpO2 (!) 89%   BMI 19.00 kg/m²     Physical Exam  Vitals and nursing note reviewed. Constitutional:       Appearance: Normal appearance. He is well-developed. HENT:      Head: Normocephalic and atraumatic. Right Ear: Tympanic membrane, ear canal and external ear normal. There is no impacted cerumen. Left Ear: Tympanic membrane, ear canal and external ear normal. There is no impacted cerumen. Nose: Nose normal.      Mouth/Throat:      Mouth: Mucous membranes are moist.      Pharynx: Oropharynx is clear. Eyes:      Extraocular Movements: Extraocular movements intact. Conjunctiva/sclera: Conjunctivae normal.      Pupils: Pupils are equal, round, and reactive to light. Cardiovascular:      Rate and Rhythm: Normal rate and regular rhythm. Pulses: Normal pulses. Heart sounds: Normal heart sounds. No murmur heard. Pulmonary:      Effort: Pulmonary effort is normal. No respiratory distress. Breath sounds: Normal breath sounds. Abdominal:      General: Bowel sounds are normal.      Palpations: Abdomen is soft. Tenderness: There is no abdominal tenderness. Musculoskeletal:         General: No swelling. Normal range of motion. Cervical back: Normal range of motion and neck supple. Right lower leg: No edema. Left lower leg: No edema. Skin:     General: Skin is warm and dry.       Capillary Refill: Capillary refill takes less than 2 seconds. Neurological:      General: No focal deficit present. Mental Status: He is alert and oriented to person, place, and time. Mental status is at baseline. Psychiatric:         Mood and Affect: Mood normal.         Behavior: Behavior normal.         Thought Content: Thought content normal.         Judgment: Judgment normal.        Patient Instructions       Medicare Preventive Visit Patient Instructions  Thank you for completing your Welcome to Medicare Visit or Medicare Annual Wellness Visit today. Your next wellness visit will be due in one year (9/1/2024). The screening/preventive services that you may require over the next 5-10 years are detailed below. Some tests may not apply to you based off risk factors and/or age. Screening tests ordered at today's visit but not completed yet may show as past due. Also, please note that scanned in results may not display below. Preventive Screenings:  Service Recommendations Previous Testing/Comments   Colorectal Cancer Screening  · Colonoscopy    · Fecal Occult Blood Test (FOBT)/Fecal Immunochemical Test (FIT)  · Fecal DNA/Cologuard Test  · Flexible Sigmoidoscopy Age: 43-73 years old   Colonoscopy: every 10 years (May be performed more frequently if at higher risk)  OR  FOBT/FIT: every 1 year  OR  Cologuard: every 3 years  OR  Sigmoidoscopy: every 5 years  Screening may be recommended earlier than age 39 if at higher risk for colorectal cancer. Also, an individualized decision between you and your healthcare provider will decide whether screening between the ages of 77-80 would be appropriate.  Colonoscopy: 06/29/2021  FOBT/FIT: Not on file  Cologuard: Not on file  Sigmoidoscopy: Not on file          Prostate Cancer Screening Individualized decision between patient and health care provider in men between ages of 53-66   Medicare will cover every 12 months beginning on the day after your 50th birthday PSA: 2.5 ng/mL     Screening Not Indicated     Hepatitis C Screening Once for adults born between 1945 and 1965  More frequently in patients at high risk for Hepatitis C Hep C Antibody: 06/03/2020    Screening Current   Diabetes Screening 1-2 times per year if you're at risk for diabetes or have pre-diabetes Fasting glucose: 115 mg/dL (2/26/2023)  A1C: No results in last 5 years (No results in last 5 years)  Screening Current   Cholesterol Screening Once every 5 years if you don't have a lipid disorder. May order more often based on risk factors. Lipid panel: 12/03/2021  Screening Current      Other Preventive Screenings Covered by Medicare:  1. Abdominal Aortic Aneurysm (AAA) Screening: covered once if your at risk. You're considered to be at risk if you have a family history of AAA or a male between the age of 70-76 who smoking at least 100 cigarettes in your lifetime. 2. Lung Cancer Screening: covers low dose CT scan once per year if you meet all of the following conditions: (1) Age 48-67; (2) No signs or symptoms of lung cancer; (3) Current smoker or have quit smoking within the last 15 years; (4) You have a tobacco smoking history of at least 20 pack years (packs per day x number of years you smoked); (5) You get a written order from a healthcare provider. 3. Glaucoma Screening: covered annually if you're considered high risk: (1) You have diabetes OR (2) Family history of glaucoma OR (3)  aged 48 and older OR (3)  American aged 72 and older  3. Osteoporosis Screening: covered every 2 years if you meet one of the following conditions: (1) Have a vertebral abnormality; (2) On glucocorticoid therapy for more than 3 months; (3) Have primary hyperparathyroidism; (4) On osteoporosis medications and need to assess response to drug therapy. 5. HIV Screening: covered annually if you're between the age of 14-79. Also covered annually if you are younger than 13 and older than 72 with risk factors for HIV infection.  For pregnant patients, it is covered up to 3 times per pregnancy. Immunizations:  Immunization Recommendations   Influenza Vaccine Annual influenza vaccination during flu season is recommended for all persons aged >= 6 months who do not have contraindications   Pneumococcal Vaccine   * Pneumococcal conjugate vaccine = PCV13 (Prevnar 13), PCV15 (Vaxneuvance), PCV20 (Prevnar 20)  * Pneumococcal polysaccharide vaccine = PPSV23 (Pneumovax) Adults 20-63 years old: 1-3 doses may be recommended based on certain risk factors  Adults 72 years old: 1-2 doses may be recommended based off what pneumonia vaccine you previously received   Hepatitis B Vaccine 3 dose series if at intermediate or high risk (ex: diabetes, end stage renal disease, liver disease)   Tetanus (Td) Vaccine - COST NOT COVERED BY MEDICARE PART B Following completion of primary series, a booster dose should be given every 10 years to maintain immunity against tetanus. Td may also be given as tetanus wound prophylaxis. Tdap Vaccine - COST NOT COVERED BY MEDICARE PART B Recommended at least once for all adults. For pregnant patients, recommended with each pregnancy. Shingles Vaccine (Shingrix) - COST NOT COVERED BY MEDICARE PART B  2 shot series recommended in those aged 48 and above     Health Maintenance Due:      Topic Date Due   • Hepatitis C Screening  Completed   • Colorectal Cancer Screening  Discontinued     Immunizations Due:      Topic Date Due   • Influenza Vaccine (1) 09/01/2023     Advance Directives   What are advance directives? Advance directives are legal documents that state your wishes and plans for medical care. These plans are made ahead of time in case you lose your ability to make decisions for yourself. Advance directives can apply to any medical decision, such as the treatments you want, and if you want to donate organs. What are the types of advance directives? There are many types of advance directives, and each state has rules about how to use them.  You may choose a combination of any of the following:  · Living will: This is a written record of the treatment you want. You can also choose which treatments you do not want, which to limit, and which to stop at a certain time. This includes surgery, medicine, IV fluid, and tube feedings. · Durable power of  for healthcare [facility]): This is a written record that states who you want to make healthcare choices for you when you are unable to make them for yourself. This person, called a proxy, is usually a family member or a friend. You may choose more than 1 proxy. · Do not resuscitate (DNR) order:  A DNR order is used in case your heart stops beating or you stop breathing. It is a request not to have certain forms of treatment, such as CPR. A DNR order may be included in other types of advance directives. · Medical directive: This covers the care that you want if you are in a coma, near death, or unable to make decisions for yourself. You can list the treatments you want for each condition. Treatment may include pain medicine, surgery, blood transfusions, dialysis, IV or tube feedings, and a ventilator (breathing machine). · Values history: This document has questions about your views, beliefs, and how you feel and think about life. This information can help others choose the care that you would choose. Why are advance directives important? An advance directive helps you control your care. Although spoken wishes may be used, it is better to have your wishes written down. Spoken wishes can be misunderstood, or not followed. Treatments may be given even if you do not want them. An advance directive may make it easier for your family to make difficult choices about your care. Fall Prevention    Fall prevention  includes ways to make your home and other areas safer. It also includes ways you can move more carefully to prevent a fall.  Health conditions that cause changes in your blood pressure, vision, or muscle strength and coordination may increase your risk for falls. Medicines may also increase your risk for falls if they make you dizzy, weak, or sleepy. Fall prevention tips:   · Stand or sit up slowly. · Use assistive devices as directed. · Wear shoes that fit well and have soles that . · Wear a personal alarm. · Stay active. · Manage your medical conditions. Home Safety Tips:  · Add items to prevent falls in the bathroom. · Keep paths clear. · Install bright lights in your home. · Keep items you use often on shelves within reach. · Paint or place reflective tape on the edges of your stairs. © Copyright [employer] ([employer]) 2018 Information is for End User's use only and may not be sold, redistributed or otherwise used for commercial purposes.  All illustrations and images included in CareNotes® are the copyrighted property of [employer] or [address] [place] [address]

## 2023-09-05 ENCOUNTER — APPOINTMENT (OUTPATIENT)
Dept: PULMONOLOGY | Facility: CLINIC | Age: 78
End: 2023-09-05
Payer: MEDICARE

## 2023-09-06 ENCOUNTER — OFFICE VISIT (OUTPATIENT)
Dept: PALLIATIVE MEDICINE | Facility: CLINIC | Age: 78
End: 2023-09-06
Payer: MEDICARE

## 2023-09-06 ENCOUNTER — CLINICAL SUPPORT (OUTPATIENT)
Dept: PULMONOLOGY | Facility: CLINIC | Age: 78
End: 2023-09-06
Payer: MEDICARE

## 2023-09-06 VITALS
OXYGEN SATURATION: 91 % | HEART RATE: 64 BPM | WEIGHT: 160 LBS | SYSTOLIC BLOOD PRESSURE: 130 MMHG | TEMPERATURE: 97.3 F | DIASTOLIC BLOOD PRESSURE: 70 MMHG | BODY MASS INDEX: 18.97 KG/M2

## 2023-09-06 DIAGNOSIS — R06.09 DYSPNEA ON MINIMAL EXERTION: ICD-10-CM

## 2023-09-06 DIAGNOSIS — Z71.89 COUNSELING REGARDING ADVANCED CARE PLANNING AND GOALS OF CARE: ICD-10-CM

## 2023-09-06 DIAGNOSIS — I27.21 PAH (PULMONARY ARTERY HYPERTENSION) (HCC): Primary | ICD-10-CM

## 2023-09-06 DIAGNOSIS — J96.11 CHRONIC HYPOXEMIC RESPIRATORY FAILURE (HCC): ICD-10-CM

## 2023-09-06 DIAGNOSIS — J84.9 ILD (INTERSTITIAL LUNG DISEASE) (HCC): Primary | Chronic | ICD-10-CM

## 2023-09-06 PROCEDURE — 99214 OFFICE O/P EST MOD 30 MIN: CPT | Performed by: INTERNAL MEDICINE

## 2023-09-06 PROCEDURE — G0239 OTH RESP PROC, GROUP: HCPCS

## 2023-09-06 NOTE — PROGRESS NOTES
Palliative and Supportive Care   Martha Blair 69 y.o. male [id number]    Assessment/Plan:  1. ILD (interstitial lung disease) ([address])    2. Dyspnea on minimal exertion    3. Counseling regarding advanced care planning and goals of care    4. Chronic hypoxemic respiratory failure (HCC)       Symptoms - continues to have dyspnea with minimal exertion, but stable. Ambulatory dysfunction largely due to MCGILL and deconditioning    · No need for opioid therapy yet at this point. Safety is still a consideration given that he continues to live by himself. · Already has HHA at home  · For now, continue management per [facility] and/or cardio  · He feels that pulmonary rehab did not bring the improve his physical condition. · He is encouraged to continue to exercise at home as tolerated. ACP/Goals  · Patient renewed his living will/POA. He names his daughter as his POA. We requested a copy to scan in his chart. · Patient continues to display good understanding, insight, and judgement into his medical condition. His daughter does as well and is very supportive of her father. He understands and volunteers his understanding that he will only worsen over time, this is only a matter of when. · Since their last visit, they have already looked into nursing home and is strongly considering [facility] in [facility] as a last option. But he really wants to stay at home is much as possible and would hire extra help at home first instead of moving to a nursing home. But they are not closed off to a nursing home if absolutely needed    Follow up  · RTO in 6 months, call sooner if needed    Requested Prescriptions      No prescriptions requested or ordered in this encounter     There are no discontinued medications. Representatives have queried the patient's controlled substance dispensing history in the Prescription Drug Monitoring Program in compliance with regulations before I have prescribed any controlled substances. The prescription history is consistent with prescribed therapy and our practice policies. 30 minutes were spent face to face with Ramon Aguilar and his daughter with greater than 50% of the time spent in counseling or coordination of care including discussions of etiology of diagnosis, pathogenesis of diagnosis, prognosis of diagnosis, diagnostic results, impression, and recommendations, risks and benefits of treatment, instructions for disease self management, treatment instructions, follow up requirements, risk factors and risk reduction of disease, patient and family counseling/involvement in care and compliance with treatment regimen, advanced care planning. All of the patient's questions were answered during this discussion. No follow-ups on file. Subjective:   Chief Complaint  Follow up visit for:  goal of care assessment and decisional support, disease process education and discussion of prognosis, advance care planning, emotional support in the setting of serious illness  HPI     Ramon Aguilar is a 69 y.o. male with ILD, pulmonary HTN, and chronic hypoxic respiratory failure who is on baseline 3L O2 at home. Currently on Ofev and Tyvaso. He was referred to palliative care by pulmonology given what appears to be worsening symptoms. He was last seen in 3/2023 where we again discussed support, disease-focused cares versus comfort-cares on hospice with his daughter who was present in the visit. Since his last visit, he was started pulmonary rehab 2 days a week. He returns today for follow up. He returns today with his daughter. Overall, he reports stable physical condition with some decline but relatively mild. He still gets very short of breath with minimal activities. He wishes it could be different, but is accepting that this is his reality and will only worsening from here. Overall, despite the continued decline, he is stable and he is rather comfortable.   He finds time to hang out with his friends weekly. His daughter checks in on him frequently. We spent time talking about continuing to exercise as tolerated. Pulmonary rehab was finished this week. He does not think that pulmonary rehab improved his physical condition. Symptom/Support/Living will/Living situation:    In terms of support, they have a relatively good system in place. His daughter lives 25 minutes away but checks in on him frequently throughout the day, everyday. He calls her too if he needs anything. His sister is also checking in on him. He also receives HHA once a week who helps with light chores at home. They are planning on asking this company to check his home for safety and to suggest any house modifications, if needed to ensure his safety. I encouraged him to ask us or his PCP for any DMEs he may need at home. Right now, he has no other needs. We explained the role of palliative in symptom management of his dyspnea. Advised that we rely on opioids and BZDs to manage this but that they come with unavoidable side effects. If it gets to the point where interventions from pulmo or cardio are no longer sufficient to provide him comfort, then opioids become necessary, outweighing risk and benefits. I do not recommend opioids at this time. And they both agreed. We again spent time discussing his living situation when he no longer can continue living independently. We discussed possible transfer to a nursing facility if appropriate. His daughter's place appears to still be an option as a last resort, but he expressed interest in a facility. I advised them to start looking for a place because there could be months-long wait. They appreciate this suggestion    We again discussed hospice as an option if the goal switches to pure comfort at home.  Discussed that hospice can be provided at home as long as they have people in place to provide 24/7 cares at home in addition to hospice team who will not be present 24/7 at home.     His wife  last year after a short dawn with multiple myeloma. She was on hospice for a few days before passing away. He does have some understanding of hospice. The following portions of the medical history were reviewed: past medical history, problem list, medication list, and social history. Current Outpatient Medications:   •  ciclopirox (LOPROX) 0.77 % cream, PLEASE SEE ATTACHED FOR DETAILED DIRECTIONS, Disp: , Rfl:   •  finasteride (PROSCAR) 5 mg tablet, TAKE 1 TABLET BY MOUTH EVERY DAY, Disp: 90 tablet, Rfl: 3  •  FLUoxetine (PROzac) 20 mg capsule, TAKE 1 CAPSULE BY MOUTH EVERY DAY, Disp: 90 capsule, Rfl: 2  •  fluticasone (FLONASE) 50 mcg/act nasal spray, 2 sprays into each nostril daily, Disp: 48 mL, Rfl: 3  •  megestrol (MEGACE ES) 625 MG/5ML suspension, Take 5 mL (625 mg total) by mouth daily, Disp: 450 mL, Rfl: 3  •  Multiple Vitamin (MULTI-DAY VITAMINS) TABS, Take 1 tablet by mouth daily, Disp: , Rfl:   •  nintedanib esylate (OFEV) 150 MG CAPS capsule, Take 1 capsule (150 mg total) by mouth 2 (two) times a day, Disp: 60 capsule, Rfl: 11  •  tamsulosin (FLOMAX) 0.4 mg, TAKE 2 CAPSULES BY MOUTH DAILY WITH DINNER. ., Disp: 180 capsule, Rfl: 3  •  temazepam (RESTORIL) 30 mg capsule, Take 1 capsule (30 mg total) by mouth daily at bedtime, Disp: 30 capsule, Rfl: 0  •  Treprostinil 64 MCG POWD, Inhale 64 mcg 4 (four) times a day, Disp: 112 each, Rfl: 11  •  fexofenadine (ALLEGRA) 180 MG tablet, Take 1 tablet (180 mg total) by mouth daily, Disp: 30 tablet, Rfl: 3  Review of Systems   Constitutional: Positive for activity change and fatigue. Negative for appetite change and unexpected weight change. HENT: Negative for trouble swallowing. Respiratory: Positive for shortness of breath. Dyspnea on minimal exertion   Cardiovascular: Negative for chest pain. Gastrointestinal: Negative for abdominal pain, constipation, diarrhea, nausea and vomiting.    Musculoskeletal: Negative for back pain. Neurological: Positive for weakness. Psychiatric/Behavioral: Negative for sleep disturbance. The patient is not nervous/anxious. All other systems reviewed and are negative. All other systems negative    Objective:  Vital Signs  /70 (BP Location: Right arm, Patient Position: Sitting, Cuff Size: Standard)   Pulse 64   Temp (!) 97.3 °F (36.3 °C) (Temporal)   Wt 72.6 kg (160 lb)   SpO2 91% Comment: 3Liters  BMI 18.97 kg/m²    Physical Exam    Constitutional: Appears well-developed and well-nourished. Appears thin, chronically ill looking but not toxic appearing. Well kempt, pleasant, jovial. Appears stable. In no acute physical or emotional distress. Head: Normocephalic and atraumatic. Temporal mm wasting  Eyes: EOM are normal. No ocular discharge. No scleral icterus. Neck: No visible adenopathy or masses  Respiratory: Effort normal. No stridor. No respiratory distress. On 3L NC. Can complete long sentences without shortness of breath  Gastrointestinal: No abdominal distension. Musculoskeletal: No edema. Neurological: Alert, oriented and appropriately conversant. Skin: No diaphoresis, no rashes seen on exposed areas of skin. Pale  Psychiatric: Displays a normal mood and affect.  Behavior, judgement and thought content appear normal.     Manish Simpson MD  Palliative Medicine & Supportive Care  Internal Medicine  Available via [facility] Text  Office: [phone]  Fax: [phone]

## 2023-09-07 ENCOUNTER — CLINICAL SUPPORT (OUTPATIENT)
Dept: PULMONOLOGY | Facility: CLINIC | Age: 78
End: 2023-09-07
Payer: MEDICARE

## 2023-09-07 DIAGNOSIS — I27.21 PAH (PULMONARY ARTERY HYPERTENSION) (HCC): Primary | ICD-10-CM

## 2023-09-07 PROCEDURE — G0239 OTH RESP PROC, GROUP: HCPCS

## 2023-09-07 NOTE — PROGRESS NOTES
Pulmonary Rehabilitation Plan of Care   Discharge      Today's date: 2023   # of Exercise Sessions Completed: 24  Patient name: Kamran Palomino      : 1945  Age: 69 y.o. MRN: [id number]  Referring Physician: Sneha Staton DO  Pulmonologist: Shanta Muñoz MD; Cardiologist: Sneha Staton DO  Provider: [facility]  Clinician: Clara Reed MS, CEP    Dx:   Encounter Diagnosis   Name Primary? • PAH (pulmonary artery hypertension) ([address]) Yes     Date of onset: 5/15/2023      SUMMARY OF PROGRESS: Discharge note for Anisha Porter. He had some improvement in functional capacity increasing 6 MW distance by  2.8% walking 925ft on 6LO2. Anisha Porter completed his initial 6MWT only on 3LO2 but has needed to increase this over time due to continued drop in SpO2. Anisha Porter completes exercise mostly using 4LO2 but increases to 6LO2 when room walking or when his SpO2 drops below 88%. Resting SpO2 93 - 95% with decreased O2 saturation during exercise 86 - 96%. Anisha Porter reports watching his SpO2 at home while doing things around the house, in which it drops significantly, sometimes into the 60s, per patient. Anisha Porter was encouraged to continue following with his Pulmonologist and send him a message when this happen. He reports that he does recover to >88% fairly quickly once resting. RPE 4-5/10 and dyspnea  0-3/10. He denies dyspnea at rest and mostly experiences some SOB when room walking. His max METs in pulmonary rehab increased from 2.0 to 2.6. His [facility] QOL improved by 12.9%. PHQ-9 score decreased from 4 to 2. DON-7 did not change with a score of 0. CAT score decreased from 25 to 18/40. SOBQ score increased  from 59 to 65. His weight decreased by 1 pounds. Anisha Porter has a stationary bike at home but admits to not using much. He was encouraged to use daily, titrating his supplemental O2 to maintain SpO2 >88%. Shan tolerates 40-45 mins at 2.0 - 2.6 METs. RHR 60 - 68, ExHR 70 - 100. Normal resting /68 - 118/72.   All group education classes on pulmonary risk factor modification were attended by the patient. The patient had the following personal goals He hoped to achieve by discharge: get back into exercise routine (GOAL MET). Discharge plans include contiuing exercise daily, including walking and riding his stationary bike. Frequency: 4-6 days/wk, Intensity: RPE 4-5, Time: 40-50 mins daily, 150-200 mins/wk. Pt was encouraged to remain compliant with medications and f/u with pulmonologist with any pulmonary symptoms and medication management.          Medication compliance: Yes   Comments: Pt reports to be compliant with medications  Fall Risk: Low   Comments: experiences lightheadedness that has caused fall in the past few years ago; no falls since    Smoking: Former user  Was smoking socially for 3-4 years in early 25s    RPD at Rest: 0/10  RPD with Exercise:  1-4/10    Assessment of progression of lung disease and functional status:  CAT: 25/40 initial; 18/40 discharge  Shortness of breath questionnaire: 59/120 initial; 65/120 discharge      EXERCISE ASSESSMENT and PLAN    Current Exercise Program in Rehab:       Frequency: 2 days/week      Minutes: 40-45        METS: 2.0-2.6              SpO2: 90-96% (86-92% walking)              RPD: 2-4                      HR:    RPE: 4-5         Modalities: UBE, NuStep, Recumbent bike and Room walking      Exercise Progression after Discharge:    Frequency: 3-5 days of gym or home exercise   Minutes: 30-50  >150 mins/wk of moderate intensity exercise   METS: 2.0 - 3.0   HR: 70 - 100    RPE: 4-6   Modalities: Recumbent bike and walking    Oxygen Needs: supplemental O2 via nasal cannula @ 3L/min at rest, supplemental O2 via nasal cannula @4-6L/min with exercise and O2 while sleeping   Oxygen Goal: Maintain SpO2>90% during exercise    Home Exercise: none  Education: pursed lipped breathing, home exercise guidelines, benefits of exercise for pulmonary disease, RPD scale, O2 saturation monitoring, appropriate O2 response to exercise and education class: Exercise For The Pulmonary Patient    Goals: reduced score on  USCD Shortness of Breath Questionnaire, Improved 6MW distance by 10%, reduced dyspnea during exercise (0-3/10), improved exercise tolerance (max METs tolerated in pulmonary rehab), SpO2 >90% during exercise, reduced score on CAT, attend pulmonary rehab regularly, decrease sitting time at home and start a walking program  Progressing:  Goals not met: SpO2 continually drops below 88% while walking, has increase use of supplemental O2.  , Goals met: completed pulmonary rehab; slight increase in 6MWT distance; increased max METs tolerated in rehab., Patient will be encouraged to focus on lifestyle modifications following discharge.     Plan: Titrate supplemental oxygen as needed to maintain SpO2>90% with exercise, learn to conserve energy with ADLs , practice breathing techniques 3x/day and reduce time sitting at home    Readiness to change: Maintenance: (Maintaining the behavior change)      NUTRITION ASSESSMENT AND PLAN    Weight control:    Starting weight: 161.6 lb   Current weight: 160 lb    Diabetes: N/A    Goals:Improved Rate Your Plate score  >53, eliminate processed meats, increase intake of fish, shellfish, use low fat dairy, choose low sodium processed foods, eliminate butter and daily saturated fat intake <7%/13g  Education: hydration  Progressing:Goals met: maitained weight of 160-161 lbs, reports making healthy choices, slight increase in RYP score from initial.  Plan: avoid processed foods and learn how to read food labels  Readiness to change: Maintenance: (Maintaining the behavior change)      PSYCHOSOCIAL ASSESSMENT AND PLAN    Emotional:  Depression assessment:  PHQ-9 = 2 1-4 = Minimal Depression            Anxiety measure:  DON-7 = 0 0-4  = Not anxious  Self-reported stress level: 5   Social support: Patient reports excellent emotional/social support from daughters, granddaughter and sister and began going out more with friends 1x/week    Goals:  Reduce perceived stress to 1-3/10, improved [facility] QOL < 27, PHQ-9 - reduced severity by one level, Physical Fitness in [facility] Score < 3, Daily Activity in [facility] Score < 3, Social Activities in [facility] Score < 3, Overall Health in [facility] Score < 3, Quality of Life in [facility] Score < 3 , Change in Health in [facility] Score < 3 , Increased interest in doing things and increased energy  Education: benefits of a positive support system    Progressing:Goals met: improvement in PHQ9 score, and pt reports feeling better with depression symptoms. , Patient will be encouraged to focus on lifestyle modifications following discharge. Plan: Practice relaxation techniques, Exercise, Enjoy a hobby, Keep a positive mindset and Enjoy family  Readiness to change: Maintenance: (Maintaining the behavior change)      OTHER CORE COMPONENTS     Tobacco:   Social History     Tobacco Use   Smoking Status Former   • Packs/day: 0.50   • Years: 5.00   • Total pack years: 2.50   • Types: Cigarettes   • Start date:    • Quit date: 26   • Years since quittin.7   Smokeless Tobacco Never       Tobacco Use Intervention: Referral to tobacco expert:   N/A: Pt has a remote history of smoking    Blood pressure:    Restin//72    Goals: consistent BP < 130/80, moderate intensity exercise >150 mins/wk and medication compliance  Education:  pathophysiology of pulmonary disease and environmental triggers  Progressing:Goals met: normal resting BP, medication compliant.   Plan: Avoid Processed foods, engage in regular exercise, eliminate salt shaker at the table, use salt substitutes and check labels for sodium content  Readiness to change: Maintenance: (Maintaining the behavior change)

## 2023-09-08 ENCOUNTER — TELEPHONE (OUTPATIENT)
Dept: PULMONOLOGY | Facility: CLINIC | Age: 78
End: 2023-09-08

## 2023-09-11 ENCOUNTER — OFFICE VISIT (OUTPATIENT)
Dept: PULMONOLOGY | Facility: CLINIC | Age: 78
End: 2023-09-11
Payer: MEDICARE

## 2023-09-11 VITALS
BODY MASS INDEX: 19.09 KG/M2 | SYSTOLIC BLOOD PRESSURE: 110 MMHG | DIASTOLIC BLOOD PRESSURE: 68 MMHG | RESPIRATION RATE: 18 BRPM | HEART RATE: 60 BPM | OXYGEN SATURATION: 96 % | HEIGHT: 77 IN | WEIGHT: 161.7 LBS

## 2023-09-11 DIAGNOSIS — J84.9 ILD (INTERSTITIAL LUNG DISEASE) (HCC): Primary | ICD-10-CM

## 2023-09-11 DIAGNOSIS — I27.20 PULMONARY HYPERTENSION (HCC): ICD-10-CM

## 2023-09-11 DIAGNOSIS — R06.09 DOE (DYSPNEA ON EXERTION): ICD-10-CM

## 2023-09-11 DIAGNOSIS — J96.11 CHRONIC HYPOXEMIC RESPIRATORY FAILURE (HCC): ICD-10-CM

## 2023-09-11 PROCEDURE — 99214 OFFICE O/P EST MOD 30 MIN: CPT | Performed by: INTERNAL MEDICINE

## 2023-09-11 NOTE — PROGRESS NOTES
Office Progress Note - Pulmonary    Dedra Gip 69 y.o. male MRN: [id number]    Encounter: [id number]      Assessment:  • Interstitial lung disease. • Pulmonary hypertension. • Chronic hypoxemic respiratory failure. • Dyspnea on exertion. • Depression. Plan:   • Ofev 150 mg p.o. twice daily. • Tyvaso. • Oxygen supplement 24 hours a day. • Continue exercise. • Follow-up in 6 months       Discussion:   The patient's pulmonary status has been stable since his last visit which was last December. I have maintained him on Ofev 150 mg p.o. twice daily. He is tolerating it well. He will also continue with Tyvaso for the pulmonary hypertension. I have advised him to exercise daily. He will look into the possibility of rejoining the pulmonary rehab. He will use the oxygen supplement 24 hours a day. I will see him in 6 months. Subjective: The patient is here for follow-up visit. He just finished with pulmonary rehab. He has dyspnea on exertion however is not worse. He has occasional cough. No nocturnal symptoms. He is taking Ofev 150 mg twice daily. Other occasions he will have diarrhea. He is also on Tyvaso. Review of systems:  A 12 point system review is done and aside from what is stated above the rest of the review of systems is negative. Family history and social history are reviewed. Medications list is reviewed. Vitals: Blood pressure 110/68, pulse 60, resp. rate 18, height 6' 5" (1.956 m), weight 73.3 kg (161 lb 11.2 oz), SpO2 96 %. ,     Physical Exam  Gen: Awake, alert, oriented x 3, no acute distress  HEENT: Mucous membranes moist, no oral lesions, no thrush  NECK: No accessory muscle use, JVP not elevated  Cardiac: Regular, single S1, single S2, no murmurs, no rubs, no gallops  Lungs: Posterior crackles. No wheezing.   Abdomen: normoactive bowel sounds, soft nontender, nondistended, no rebound or rigidity, no guarding  Extremities: no cyanosis, no clubbing, no edema  Neuro:  Grossly nonfocal.  Skin:  No rash.     Lab Results   Component Value Date    WBC 6.84 02/26/2023    HGB 11.7 (L) 02/26/2023    HCT 36.7 02/26/2023     (H) 02/26/2023     02/26/2023       Lab Results   Component Value Date    SODIUM 137 02/26/2023    K 4.3 02/26/2023     02/26/2023    CO2 31 02/26/2023    BUN 15 02/26/2023    CREATININE 0.72 02/26/2023    GLUC 104 10/18/2022    CALCIUM 9.1 02/26/2023

## 2023-09-12 ENCOUNTER — APPOINTMENT (OUTPATIENT)
Dept: PULMONOLOGY | Facility: CLINIC | Age: 78
End: 2023-09-12
Payer: MEDICARE

## 2023-09-14 ENCOUNTER — APPOINTMENT (OUTPATIENT)
Dept: PULMONOLOGY | Facility: CLINIC | Age: 78
End: 2023-09-14
Payer: MEDICARE

## 2023-09-14 DIAGNOSIS — F32.A DEPRESSION, UNSPECIFIED DEPRESSION TYPE: ICD-10-CM

## 2023-09-15 RX ORDER — FLUOXETINE HYDROCHLORIDE 20 MG/1
CAPSULE ORAL
Qty: 90 CAPSULE | Refills: 2 | Status: SHIPPED | OUTPATIENT
Start: 2023-09-15

## 2023-09-19 ENCOUNTER — APPOINTMENT (OUTPATIENT)
Dept: PULMONOLOGY | Facility: CLINIC | Age: 78
End: 2023-09-19
Payer: MEDICARE

## 2023-09-21 ENCOUNTER — APPOINTMENT (OUTPATIENT)
Dept: PULMONOLOGY | Facility: CLINIC | Age: 78
End: 2023-09-21
Payer: MEDICARE

## 2023-09-22 NOTE — PROGRESS NOTES
Outpatient Cardiology Note - Shannon Voss 69 y.o. male MRN: [id number]    @ Encounter: [id number]        Patient Active Problem List    Diagnosis Date Noted   • Pulmonary hypertension ([address]) 10/18/2022   • Chronic hypoxemic respiratory failure ([address]) 09/01/2023   • Mild protein-calorie malnutrition ([address]) 09/01/2023   • Dyspnea on minimal exertion 03/08/2023   • Ambulatory dysfunction 03/08/2023   • Other specified anemias 02/27/2023   • Counseling regarding advanced care planning and goals of care 12/08/2022   • Primary insomnia 10/31/2022   • Abnormal weight loss 10/31/2022   • ILD (interstitial lung disease) ([address]) 10/07/2022   • ROSALINA (obstructive sleep apnea)    • COVID-19 07/07/2022   • Dermatitis 02/18/2022   • Dry nose 12/27/2021   • Shoulder pain 06/03/2020   • Left anterior hemiblock 03/08/2020   • Medicare annual wellness visit, initial 10/02/2019   • Cough 07/03/2019   • 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   • Other hemorrhoids 03/05/2019   • BPH with obstruction/lower urinary tract symptoms 06/13/2018   • Combined arterial insufficiency and corporo-venous occlusive erectile dysfunction 06/13/2018       Assessment:  # Pulmonary arterial HTN  Impression: ILD- PH with predominance of PAH over ILD; precapillary PAH  PAH Rx: Tyvaso DPI 64 mcg  Oxygen: 2 liters  Diuretic: not needed  Weight: 169 lbs  BNP 2/26/23: 129  NT proBNP: 6/30/22: 187    Functional assessment:  6 min walk 8/18/22: 135 meters, pulse ox dropped to 87%  6 min walk 7/13/22: 308 meters, pulse ox dropped to 90%  6 min walk 2/24/22: 476 meters    Spirometry/ PFTs:  Spirometry 9/22/22: moderately to severely restrictive  PFTs 3/16/22: mild restrictive, DLCO 29%    Studies- personally reviewed by me  Echo 4/5/23:  LVEF: 50%  Rv: mildly dilated, normal function  PASP: normal  RVOT: no notching    RHC 10/18/22:  SaO2: 93%  HR: 60  RA: 1 mmHg  PA: 53/18/30 mmHg  PCWP: 3  PA sat 65%  Shaheen Co/CI: 4.7/2.4 L/min  PVR: 5.7 HOANG    Echo 9/17/22  LVEF: 50%  RV: mildly dilated- RVID 4 cm  PASP: 72 mmHg  RVOT: no notching  Aorta 4.2 cm  Dilated IVC    HRCT 3/11/22: biapical pleural parenchymal thickening. 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 He also has untreated ROSALINA and has been experiencing worsening MCGILL. His echo has shown indirect PA pressures at 72 mmHg with dilated IVC, CT chest showing pulmonary fibrosis with enlargement of main PA of 3.8 cm. Had myhomemoveough in May, hit him hard. Lost his wife this year to multiple myeloma. He had a tough year. No hx of spinal stenosis or carpal tunnel    Interim:  Referred to and going to cardiac rehab- completed  Trying to keep doing exercises  Does not feel any better  Past Medical History:   Diagnosis Date   • Bifascicular block 3/8/2020    Asx: discovered on Ekg done 3/20 in eval of chest pain. • BPH with obstruction/lower urinary tract symptoms    • BPH with urinary obstruction    • 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    • Erectile dysfunction        Review of Systems   Constitutional: Negative for activity change, appetite change, fatigue and unexpected weight change. HENT: Negative for congestion and nosebleeds. Eyes: Negative. Respiratory: Positive for shortness of breath. Negative for cough and chest tightness. Cardiovascular: Negative for chest pain, palpitations and leg swelling. Gastrointestinal: Negative for abdominal distention. Endocrine: Negative. Genitourinary: Negative. Musculoskeletal: Negative. Skin: Negative. Neurological: Negative for dizziness, syncope and weakness. Hematological: Negative. Psychiatric/Behavioral: Negative.         No Known Allergies  . Current Outpatient Medications:   •  ciclopirox (LOPROX) 0.77 % cream, PLEASE SEE ATTACHED FOR DETAILED DIRECTIONS, Disp: , Rfl:   •  fexofenadine (ALLEGRA) 180 MG tablet, Take 1 tablet (180 mg total) by mouth daily, Disp: 30 tablet, Rfl: 3  •  finasteride (PROSCAR) 5 mg tablet, TAKE 1 TABLET BY MOUTH EVERY DAY, Disp: 90 tablet, Rfl: 3  •  FLUoxetine (PROzac) 20 mg capsule, TAKE 1 CAPSULE BY MOUTH EVERY DAY, Disp: 90 capsule, Rfl: 2  •  fluticasone (FLONASE) 50 mcg/act nasal spray, 2 sprays into each nostril daily (Patient not taking: Reported on 2023), Disp: 48 mL, Rfl: 3  •  megestrol (MEGACE ES) 625 MG/5ML suspension, Take 5 mL (625 mg total) by mouth daily, Disp: 450 mL, Rfl: 3  •  Multiple Vitamin (MULTI-DAY VITAMINS) TABS, Take 1 tablet by mouth daily, Disp: , Rfl:   •  nintedanib esylate (OFEV) 150 MG CAPS capsule, Take 1 capsule (150 mg total) by mouth 2 (two) times a day, Disp: 60 capsule, Rfl: 11  •  tamsulosin (FLOMAX) 0.4 mg, TAKE 2 CAPSULES BY MOUTH DAILY WITH DINNER. ., Disp: 180 capsule, Rfl: 3  •  temazepam (RESTORIL) 30 mg capsule, Take 1 capsule (30 mg total) by mouth daily at bedtime, Disp: 30 capsule, Rfl: 0  •  Treprostinil 64 MCG POWD, Inhale 64 mcg 4 (four) times a day, Disp: 112 each, Rfl: 11    Social History     Socioeconomic History   • Marital status: /Civil Union     Spouse name: Not on file   • Number of children: Not on file   • Years of education: Not on file   • Highest education level: Not on file   Occupational History   • Not on file   Tobacco Use   • Smoking status: Former     Packs/day: 0.50     Years: 5.00     Total pack years: 2.50     Types: Cigarettes     Start date: 1     Quit date: 26     Years since quittin.7   • Smokeless tobacco: Never   Vaping Use   • Vaping Use: Never used   Substance and Sexual Activity   • Alcohol use: Not Currently     Alcohol/week: 12.0 standard drinks of alcohol     Types: 12 Cans of beer per week     Comment: socially   • Drug use: No   • Sexual activity: Not Currently   Other Topics Concern   • Not on file   Social History Narrative   • Not on file     Social Determinants of Health     Financial Resource Strain: Low Risk  (9/1/2023)    Overall Financial Resource Strain (CARDIA)    • Difficulty of Paying Living Expenses: Not very hard   Food Insecurity: Not on file   Transportation Needs: No Transportation Needs (9/1/2023)    PRAPARE - Transportation    • Lack of Transportation (Medical): No    • Lack of Transportation (Non-Medical): No   Physical Activity: Not on file   Stress: Not on file   Social Connections: Not on file   Intimate Partner Violence: Not on file   Housing Stability: Not on file       Family History   Family history unknown: Yes       Physical Exam:    Vitals: There were no vitals taken for this visit. , There is no height or weight on file to calculate BMI.,   Wt Readings from Last 3 Encounters:   09/11/23 73.3 kg (161 lb 11.2 oz)   09/06/23 72.6 kg (160 lb)   09/01/23 72.7 kg (160 lb 3.2 oz)       Physical Exam:  There were no vitals filed for this visit. Physical Exam  Constitutional:       Appearance: He is well-developed. HENT:      Head: Normocephalic and atraumatic. Eyes:      Pupils: Pupils are equal, round, and reactive to light. Neck:      Vascular: No JVD. Cardiovascular:      Rate and Rhythm: Normal rate and regular rhythm. Heart sounds: No murmur heard. Pulmonary:      Effort: Pulmonary effort is normal. No respiratory distress. Breath sounds: Normal breath sounds. Abdominal:      General: There is no distension. Palpations: Abdomen is soft. Tenderness: There is no abdominal tenderness. Musculoskeletal:         General: Normal range of motion. Cervical back: Normal range of motion. Skin:     General: Skin is warm and dry. Findings: No rash. Neurological:      Mental Status: He is alert and oriented to person, place, and time.        Labs & Results:    Lab Results   Component Value Date    WBC 6.84 02/26/2023    HGB 11.7 (L) 02/26/2023    HCT 36.7 02/26/2023     (H) 02/26/2023     02/26/2023     Lab Results   Component Value Date    SODIUM 137 02/26/2023    K 4.3 02/26/2023     02/26/2023    CO2 31 02/26/2023    BUN 15 02/26/2023    CREATININE 0.72 02/26/2023    GLUC 104 10/18/2022    CALCIUM 9.1 02/26/2023     Lab Results   Component Value Date     (H) 02/26/2023      Lab Results   Component Value Date    CHOLESTEROL 136 12/03/2021     Lab Results   Component Value Date    HDL 68 12/03/2021     Lab Results   Component Value Date    TRIG 43 12/03/2021     Lab Results   Component Value Date    NONHDLC 68 12/03/2021     EKG personally reviewed by [place]. Counseling / Coordination of Care  Time spent today 25 minutes. Greater than 50% of total time was spent with the patient and / or family counseling and / or coordination of care. We discussed diagnoses, most recent studies and any changes in treatment  Thank you for the opportunity to participate in the care of this patient.     Danny Lynch PULMONARY HYPERTENSION  MEDICAL DIRECTOR OF [address] [place] [address]

## 2023-09-25 ENCOUNTER — OFFICE VISIT (OUTPATIENT)
Dept: CARDIOLOGY CLINIC | Facility: CLINIC | Age: 78
End: 2023-09-25
Payer: MEDICARE

## 2023-09-25 ENCOUNTER — TREATMENT (OUTPATIENT)
Dept: FAMILY MEDICINE CLINIC | Facility: CLINIC | Age: 78
End: 2023-09-25

## 2023-09-25 VITALS
OXYGEN SATURATION: 90 % | BODY MASS INDEX: 19.27 KG/M2 | DIASTOLIC BLOOD PRESSURE: 50 MMHG | WEIGHT: 162.5 LBS | SYSTOLIC BLOOD PRESSURE: 115 MMHG | HEART RATE: 82 BPM

## 2023-09-25 DIAGNOSIS — E44.1 MILD PROTEIN-CALORIE MALNUTRITION (HCC): Primary | ICD-10-CM

## 2023-09-25 DIAGNOSIS — J84.9 ILD (INTERSTITIAL LUNG DISEASE) (HCC): Chronic | ICD-10-CM

## 2023-09-25 DIAGNOSIS — R06.09 DYSPNEA ON MINIMAL EXERTION: ICD-10-CM

## 2023-09-25 DIAGNOSIS — I27.21 PAH (PULMONARY ARTERY HYPERTENSION) (HCC): Primary | ICD-10-CM

## 2023-09-25 DIAGNOSIS — G47.33 OSA (OBSTRUCTIVE SLEEP APNEA): ICD-10-CM

## 2023-09-25 PROCEDURE — 99214 OFFICE O/P EST MOD 30 MIN: CPT | Performed by: INTERNAL MEDICINE

## 2023-09-25 RX ORDER — SILDENAFIL CITRATE 20 MG/1
10 TABLET ORAL 3 TIMES DAILY
Qty: 90 TABLET | Refills: 5 | Status: SHIPPED | OUTPATIENT
Start: 2023-09-25 | End: 2023-10-03 | Stop reason: SDUPTHER

## 2023-09-25 NOTE — PROGRESS NOTES
F/U : weight issues: down to QOD on Megace and weight stable for several mos :  Try eliminating it .

## 2023-09-26 ENCOUNTER — APPOINTMENT (OUTPATIENT)
Dept: PULMONOLOGY | Facility: CLINIC | Age: 78
End: 2023-09-26
Payer: MEDICARE

## 2023-09-28 ENCOUNTER — APPOINTMENT (OUTPATIENT)
Dept: PULMONOLOGY | Facility: CLINIC | Age: 78
End: 2023-09-28
Payer: MEDICARE

## 2023-10-02 DIAGNOSIS — J84.9 ILD (INTERSTITIAL LUNG DISEASE) (HCC): ICD-10-CM

## 2023-10-03 ENCOUNTER — TELEPHONE (OUTPATIENT)
Dept: CARDIOLOGY CLINIC | Facility: CLINIC | Age: 78
End: 2023-10-03

## 2023-10-03 DIAGNOSIS — I27.21 PAH (PULMONARY ARTERY HYPERTENSION) (HCC): ICD-10-CM

## 2023-10-03 RX ORDER — SILDENAFIL CITRATE 20 MG/1
10 TABLET ORAL 3 TIMES DAILY
Qty: 90 TABLET | Refills: 5 | Status: SHIPPED | OUTPATIENT
Start: 2023-10-03

## 2023-10-04 RX ORDER — NINTEDANIB 150 MG/1
150 CAPSULE ORAL 2 TIMES DAILY WITH MEALS
Qty: 60 CAPSULE | Refills: 11 | Status: SHIPPED | OUTPATIENT
Start: 2023-10-04

## 2023-10-13 ENCOUNTER — TELEPHONE (OUTPATIENT)
Dept: CARDIOLOGY CLINIC | Facility: CLINIC | Age: 78
End: 2023-10-13

## 2023-10-17 ENCOUNTER — APPOINTMENT (OUTPATIENT)
Dept: LAB | Facility: MEDICAL CENTER | Age: 78
End: 2023-10-17
Payer: MEDICARE

## 2023-10-17 DIAGNOSIS — Z12.5 PROSTATE CANCER SCREENING: ICD-10-CM

## 2023-10-17 DIAGNOSIS — I27.21 PAH (PULMONARY ARTERY HYPERTENSION) (HCC): ICD-10-CM

## 2023-10-17 DIAGNOSIS — R06.09 DYSPNEA ON MINIMAL EXERTION: ICD-10-CM

## 2023-10-17 DIAGNOSIS — J84.9 ILD (INTERSTITIAL LUNG DISEASE) (HCC): Chronic | ICD-10-CM

## 2023-10-17 LAB
ANION GAP SERPL CALCULATED.3IONS-SCNC: 4 MMOL/L
BASOPHILS # BLD AUTO: 0.04 THOUSANDS/ÂΜL (ref 0–0.1)
BASOPHILS NFR BLD AUTO: 1 % (ref 0–1)
BNP SERPL-MCNC: 82 PG/ML (ref 0–100)
BUN SERPL-MCNC: 20 MG/DL (ref 5–25)
CALCIUM SERPL-MCNC: 9 MG/DL (ref 8.4–10.2)
CHLORIDE SERPL-SCNC: 103 MMOL/L (ref 96–108)
CO2 SERPL-SCNC: 30 MMOL/L (ref 21–32)
CREAT SERPL-MCNC: 0.62 MG/DL (ref 0.6–1.3)
EOSINOPHIL # BLD AUTO: 0.15 THOUSAND/ÂΜL (ref 0–0.61)
EOSINOPHIL NFR BLD AUTO: 3 % (ref 0–6)
ERYTHROCYTE [DISTWIDTH] IN BLOOD BY AUTOMATED COUNT: 13.5 % (ref 11.6–15.1)
GFR SERPL CREATININE-BSD FRML MDRD: 95 ML/MIN/1.73SQ M
GLUCOSE P FAST SERPL-MCNC: 96 MG/DL (ref 65–99)
HCT VFR BLD AUTO: 38.5 % (ref 36.5–49.3)
HGB BLD-MCNC: 12.6 G/DL (ref 12–17)
IMM GRANULOCYTES # BLD AUTO: 0.02 THOUSAND/UL (ref 0–0.2)
IMM GRANULOCYTES NFR BLD AUTO: 0 % (ref 0–2)
LYMPHOCYTES # BLD AUTO: 1.02 THOUSANDS/ÂΜL (ref 0.6–4.47)
LYMPHOCYTES NFR BLD AUTO: 20 % (ref 14–44)
MCH RBC QN AUTO: 33 PG (ref 26.8–34.3)
MCHC RBC AUTO-ENTMCNC: 32.7 G/DL (ref 31.4–37.4)
MCV RBC AUTO: 101 FL (ref 82–98)
MONOCYTES # BLD AUTO: 0.37 THOUSAND/ÂΜL (ref 0.17–1.22)
MONOCYTES NFR BLD AUTO: 7 % (ref 4–12)
NEUTROPHILS # BLD AUTO: 3.64 THOUSANDS/ÂΜL (ref 1.85–7.62)
NEUTS SEG NFR BLD AUTO: 69 % (ref 43–75)
NRBC BLD AUTO-RTO: 0 /100 WBCS
PLATELET # BLD AUTO: 271 THOUSANDS/UL (ref 149–390)
PMV BLD AUTO: 8.4 FL (ref 8.9–12.7)
POTASSIUM SERPL-SCNC: 3.9 MMOL/L (ref 3.5–5.3)
RBC # BLD AUTO: 3.82 MILLION/UL (ref 3.88–5.62)
SODIUM SERPL-SCNC: 137 MMOL/L (ref 135–147)
WBC # BLD AUTO: 5.24 THOUSAND/UL (ref 4.31–10.16)

## 2023-10-17 PROCEDURE — 83880 ASSAY OF NATRIURETIC PEPTIDE: CPT

## 2023-11-15 DIAGNOSIS — J84.9 ILD (INTERSTITIAL LUNG DISEASE) (HCC): ICD-10-CM

## 2023-11-15 DIAGNOSIS — I27.20 PULMONARY HYPERTENSION (HCC): Primary | ICD-10-CM

## 2023-12-01 ENCOUNTER — TREATMENT (OUTPATIENT)
Dept: FAMILY MEDICINE CLINIC | Facility: CLINIC | Age: 78
End: 2023-12-01

## 2023-12-01 DIAGNOSIS — R63.4 WEIGHT LOSS: ICD-10-CM

## 2023-12-01 RX ORDER — MEGESTROL ACETATE 125 MG/ML
625 SUSPENSION ORAL DAILY
Qty: 450 ML | Refills: 3 | Status: SHIPPED | OUTPATIENT
Start: 2023-12-01

## 2024-01-22 ENCOUNTER — TELEPHONE (OUTPATIENT)
Dept: CARDIOLOGY CLINIC | Facility: CLINIC | Age: 79
End: 2024-01-22

## 2024-01-22 NOTE — TELEPHONE ENCOUNTER
[facility],called pt failed to call in New plan   [phone]    ID [id number]  New plan   Sildenafil  20MG TABLETS 10 MG TID in review

## 2024-01-31 NOTE — PROGRESS NOTES
Outpatient Cardiology Note - Pilo Manriquez 78 y.o. male MRN: [id number]    @ Encounter: [id number]        Patient Active Problem List    Diagnosis Date Noted    Pulmonary hypertension (HCC) 10/18/2022    Chronic hypoxemic respiratory failure ([facility]) 09/01/2023    Mild protein-calorie malnutrition ([facility]) 09/01/2023    Dyspnea on minimal exertion 03/08/2023    Ambulatory dysfunction 03/08/2023    Other specified anemias 02/27/2023    Counseling regarding advanced care planning and goals of care 12/08/2022    Primary insomnia 10/31/2022    Abnormal weight loss 10/31/2022    ILD (interstitial lung disease) (HCC) 10/07/2022    ROSALINA (obstructive sleep apnea)     COVID-19 07/07/2022    Dermatitis 02/18/2022    Dry nose 12/27/2021    Shoulder pain 06/03/2020    Left anterior hemiblock 03/08/2020    Medicare annual wellness visit, initial 10/02/2019    Cough 07/03/2019    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    Other hemorrhoids 03/05/2019    BPH with obstruction/lower urinary tract symptoms 06/13/2018    Combined arterial insufficiency and corporo-venous occlusive erectile dysfunction 06/13/2018       Assessment:  # Pulmonary arterial HTN  Impression: ILD- PH with predominance of PAH over ILD; precapillary PAH  PAH Rx: Tyvaso DPI 64 mcg, sildanefil 10 mg TID  Oxygen: 2 liters  Diuretic: not needed  Weight: 171 lbs  BNP 10/17/23: 82  2/26/23: 129  NT proBNP: 6/30/22: 187    Functional assessment:  6 min walk 8/18/22: 135 meters, pulse ox dropped to 87%  6 min walk 7/13/22: 308 meters, pulse ox dropped to 90%  6 min walk 2/24/22: 476 meters    Spirometry/ PFTs:  Spirometry 9/22/22: moderately to severely restrictive  PFTs 3/16/22: mild restrictive, DLCO 29%    Studies- personally reviewed by me  Echo 4/5/23:  LVEF: 50%  RV: mildly dilated, normal function  PASP: normal  RVOT: no notching    RHC 10/18/22:  SaO2: 93%  HR: 60  RA: 1 mmHg  PA: 53/18/30 mmHg  PCWP: 3  PA sat 65%  Shaheen Co/CI: 4.7/2.4 L/min  PVR: 5.7 HOANG    Echo  9/17/22  LVEF: 50%  RV: mildly dilated- RVID 4 cm  PASP: 72 mmHg  RVOT: no notching  Aorta 4.2 cm  Dilated IVC    HRCT 3/11/22: biapical pleural parenchymal thickening. 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 He also has untreated ROSALINA and has been experiencing worsening MCGILL. His echo has shown indirect PA pressures at 72 mmHg with dilated IVC, CT chest showing pulmonary fibrosis with enlargement of main PA of 3.8 cm. Had COVID in May, hit him hard. Lost his wife this year to multiple myeloma. He had a tough year.      No hx of spinal stenosis or carpal tunnel    Interim:  Referred to and going to cardiac rehab- completed- did not really help  Trying to keep doing exercises  Does not feel any better  Weight is stable, overall down 40 lbs    Making shakes- ice cream, peanut butter  Past Medical History:   Diagnosis Date    Bifascicular block 3/8/2020    Asx: discovered on Ekg done 3/20 in eval of chest pain.     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     Erectile dysfunction        Review of Systems   Constitutional:  Negative for activity change, appetite change, fatigue and unexpected weight change.   HENT:  Negative for congestion and nosebleeds.    Eyes: Negative.    Respiratory:  Positive for shortness of breath. Negative for cough and chest tightness.    Cardiovascular:  Negative for chest pain, palpitations and leg swelling.   Gastrointestinal:  Negative for abdominal distention.   Endocrine: Negative.    Genitourinary: Negative.    Musculoskeletal: Negative.    Skin: Negative.     Neurological:  Negative for dizziness, syncope and weakness.   Hematological: Negative.    Psychiatric/Behavioral: Negative.         No Known Allergies  .    Current Outpatient Medications:     megestrol (MEGACE ES) 625 MG/5ML suspension, Take 5 mL (625 mg total) by mouth daily, Disp: 450 mL, Rfl: 3    ciclopirox (LOPROX) 0.77 % cream, PLEASE SEE ATTACHED FOR DETAILED DIRECTIONS, Disp: , Rfl:     fexofenadine (ALLEGRA) 180 MG tablet, Take 1 tablet (180 mg total) by mouth daily, Disp: 30 tablet, Rfl: 3    finasteride (PROSCAR) 5 mg tablet, TAKE 1 TABLET BY MOUTH EVERY DAY, Disp: 90 tablet, Rfl: 3    FLUoxetine (PROzac) 20 mg capsule, TAKE 1 CAPSULE BY MOUTH EVERY DAY, Disp: 90 capsule, Rfl: 2    fluticasone (FLONASE) 50 mcg/act nasal spray, 2 sprays into each nostril daily (Patient not taking: Reported on 9/11/2023), Disp: 48 mL, Rfl: 3    Multiple Vitamin (MULTI-DAY VITAMINS) TABS, Take 1 tablet by mouth daily, Disp: , Rfl:     Ofev 150 MG CAPS capsule, TAKE 1 CAPSULE BY MOUTH TWICE DAILY WITH FOOD., Disp: 60 capsule, Rfl: 11    sildenafil (REVATIO) 20 mg tablet, Take 0.5 tablets (10 mg total) by mouth 3 (three) times a day, Disp: 90 tablet, Rfl: 5    tamsulosin (FLOMAX) 0.4 mg, TAKE 2 CAPSULES BY MOUTH DAILY WITH DINNER.., Disp: 180 capsule, Rfl: 3    temazepam (RESTORIL) 30 mg capsule, TAKE 1 CAPSULE (30 MG TOTAL) BY MOUTH DAILY AT BEDTIME, Disp: 30 capsule, Rfl: 5    Treprostinil 64 MCG POWD, Inhale 64 mcg 4 (four) times a day, Disp: 112 each, Rfl: 11    Social History     Socioeconomic History    Marital status: /Civil Union     Spouse name: Not on file    Number of children: Not on file    Years of education: Not on file    Highest education level: Not on file   Occupational History    Not on file   Tobacco Use    Smoking status: Former     Current packs/day: 0.00     Average packs/day: 0.5 packs/day for 5.0 years (2.5 ttl pk-yrs)     Types: Cigarettes     Start date: 1966     Quit date: 1971     Years  since quittin.1    Smokeless tobacco: Never   Vaping Use    Vaping status: Never Used   Substance and Sexual Activity    Alcohol use: Not Currently     Alcohol/week: 12.0 standard drinks of alcohol     Types: 12 Cans of beer per week     Comment: socially    Drug use: No    Sexual activity: Not Currently   Other Topics Concern    Not on file   Social History Narrative    Not on file     Social Determinants of Health     Financial Resource Strain: Low Risk  (2023)    Overall Financial Resource Strain (CARDIA)     Difficulty of Paying Living Expenses: Not very hard   Food Insecurity: Not on file   Transportation Needs: No Transportation Needs (2023)    PRAPARE - Transportation     Lack of Transportation (Medical): No     Lack of Transportation (Non-Medical): No   Physical Activity: Not on file   Stress: Not on file   Social Connections: Not on file   Intimate Partner Violence: Not on file   Housing Stability: Not on file       Family History   Family history unknown: Yes       Physical Exam:    Vitals: There were no vitals taken for this visit., There is no height or weight on file to calculate BMI.,   Wt Readings from Last 3 Encounters:   23 73.7 kg (162 lb 8 oz)   23 73.3 kg (161 lb 11.2 oz)   23 72.6 kg (160 lb)       Physical Exam:  There were no vitals filed for this visit.    Physical Exam  Constitutional:       Appearance: He is well-developed.   HENT:      Head: Normocephalic and atraumatic.   Eyes:      Pupils: Pupils are equal, round, and reactive to light.   Neck:      Vascular: No JVD.   Cardiovascular:      Rate and Rhythm: Normal rate and regular rhythm.      Heart sounds: No murmur heard.  Pulmonary:      Effort: Pulmonary effort is normal. No respiratory distress.      Breath sounds: Normal breath sounds.   Abdominal:      General: There is no distension.      Palpations: Abdomen is soft.      Tenderness: There is no abdominal tenderness.   Musculoskeletal:          General: Normal range of motion.      Cervical back: Normal range of motion.   Skin:     General: Skin is warm and dry.      Findings: No rash.   Neurological:      Mental Status: He is alert and oriented to person, place, and time.       Labs & Results:    Lab Results   Component Value Date    WBC 5.24 10/17/2023    HGB 12.6 10/17/2023    HCT 38.5 10/17/2023     (H) 10/17/2023     10/17/2023     Lab Results   Component Value Date    SODIUM 137 10/17/2023    K 3.9 10/17/2023     10/17/2023    CO2 30 10/17/2023    BUN 20 10/17/2023    CREATININE 0.62 10/17/2023    GLUC 104 10/18/2022    CALCIUM 9.0 10/17/2023     Lab Results   Component Value Date    BNP 82 10/17/2023      Lab Results   Component Value Date    CHOLESTEROL 136 12/03/2021     Lab Results   Component Value Date    HDL 68 12/03/2021     Lab Results   Component Value Date    TRIG 43 12/03/2021     Lab Results   Component Value Date    NONHDLC 68 12/03/2021     EKG personally reviewed by Laureano Canseco.     Counseling / Coordination of Care  Time spent today 25 minutes.  Greater than 50% of total time was spent with the patient and / or family counseling and / or coordination of care. We discussed diagnoses, most recent studies and any changes in treatment  Thank you for the opportunity to participate in the care of this patient.    LAUREANO CANSECO D.O.  DIRECTOR OF HEART FAILURE/ PULMONARY HYPERTENSION  MEDICAL DIRECTOR OF LVAD PROGRAM  [facility]

## 2024-02-07 ENCOUNTER — OFFICE VISIT (OUTPATIENT)
Dept: CARDIOLOGY CLINIC | Facility: CLINIC | Age: 79
End: 2024-02-07
Payer: MEDICARE

## 2024-02-07 VITALS
WEIGHT: 167 LBS | OXYGEN SATURATION: 90 % | BODY MASS INDEX: 19.72 KG/M2 | HEIGHT: 77 IN | SYSTOLIC BLOOD PRESSURE: 120 MMHG | DIASTOLIC BLOOD PRESSURE: 70 MMHG | HEART RATE: 73 BPM

## 2024-02-07 DIAGNOSIS — J84.9 ILD (INTERSTITIAL LUNG DISEASE) (HCC): Primary | Chronic | ICD-10-CM

## 2024-02-07 DIAGNOSIS — N52.03 COMBINED ARTERIAL INSUFFICIENCY AND CORPORO-VENOUS OCCLUSIVE ERECTILE DYSFUNCTION: ICD-10-CM

## 2024-02-07 DIAGNOSIS — I27.20 PULMONARY HYPERTENSION (HCC): ICD-10-CM

## 2024-02-07 DIAGNOSIS — G47.33 OSA (OBSTRUCTIVE SLEEP APNEA): ICD-10-CM

## 2024-02-07 PROCEDURE — 99214 OFFICE O/P EST MOD 30 MIN: CPT | Performed by: INTERNAL MEDICINE

## 2024-02-21 DIAGNOSIS — N13.8 BPH WITH OBSTRUCTION/LOWER URINARY TRACT SYMPTOMS: ICD-10-CM

## 2024-02-21 DIAGNOSIS — N40.1 BPH WITH OBSTRUCTION/LOWER URINARY TRACT SYMPTOMS: ICD-10-CM

## 2024-02-21 PROBLEM — R05.9 COUGH: Status: RESOLVED | Noted: 2019-07-03 | Resolved: 2024-02-21

## 2024-02-21 PROBLEM — Z00.00 MEDICARE ANNUAL WELLNESS VISIT, INITIAL: Status: RESOLVED | Noted: 2019-10-02 | Resolved: 2024-02-21

## 2024-02-21 RX ORDER — FINASTERIDE 5 MG/1
TABLET, FILM COATED ORAL
Qty: 90 TABLET | Refills: 1 | Status: SHIPPED | OUTPATIENT
Start: 2024-02-21

## 2024-02-21 RX ORDER — TAMSULOSIN HYDROCHLORIDE 0.4 MG/1
CAPSULE ORAL
Qty: 180 CAPSULE | Refills: 1 | Status: SHIPPED | OUTPATIENT
Start: 2024-02-21

## 2024-03-04 ENCOUNTER — OFFICE VISIT (OUTPATIENT)
Dept: PALLIATIVE MEDICINE | Facility: CLINIC | Age: 79
End: 2024-03-04
Payer: MEDICARE

## 2024-03-04 VITALS
HEART RATE: 71 BPM | BODY MASS INDEX: 20.87 KG/M2 | TEMPERATURE: 97.5 F | SYSTOLIC BLOOD PRESSURE: 124 MMHG | WEIGHT: 176 LBS | DIASTOLIC BLOOD PRESSURE: 70 MMHG | OXYGEN SATURATION: 90 %

## 2024-03-04 DIAGNOSIS — R06.09 DYSPNEA ON MINIMAL EXERTION: ICD-10-CM

## 2024-03-04 DIAGNOSIS — J84.9 ILD (INTERSTITIAL LUNG DISEASE) (HCC): Primary | Chronic | ICD-10-CM

## 2024-03-04 DIAGNOSIS — Z71.89 COUNSELING REGARDING ADVANCED CARE PLANNING AND GOALS OF CARE: ICD-10-CM

## 2024-03-04 PROCEDURE — G2211 COMPLEX E/M VISIT ADD ON: HCPCS | Performed by: INTERNAL MEDICINE

## 2024-03-04 PROCEDURE — 99214 OFFICE O/P EST MOD 30 MIN: CPT | Performed by: INTERNAL MEDICINE

## 2024-03-04 NOTE — ASSESSMENT & PLAN NOTE
Gets very winded on minimal exertion. But takes rest in between activities. Takes about 1-2 minutes for his breathlessness with lightheadedness to ease up  Encouraged to use the oxygen with activities  He has completed cardiac rehab but did not help  Discussed use of opioid for MCGILL as well as their side effects. I do not believe he is appropriate for this yet and they agree. They do not want to start this yet. He does not feel like he is at this point yet. Additionally. His safety is still a concern at this time since he continues to live by himself

## 2024-03-04 NOTE — PROGRESS NOTES
Palliative and Supportive Care   Pilo Manriquez 78 y.o. male [id number]    Assessment/Plan:  1. ILD (interstitial lung disease) ([facility])    2. Dyspnea on minimal exertion    3. Counseling regarding advanced care planning and goals of care      Symptoms - continues to have dyspnea with minimal exertion, more often now but still manageable with rest in between activities. Ambulatory dysfunction largely due to MCGILL and deconditioning    1. ILD (interstitial lung disease) ([facility])  Assessment & Plan:  Reports steady, slow decline in the last 6 months especially in terms of breathlessness with activities, but he is now more open to finding and using ways to adapt to his slowly changing baseline (more open to resting in between activities, more willing to use a motorized scooter at the grocery, etc)  Despite slow decline, patient is relatively stable and is content with his quality of life  Continue close follow up with pulmo.       2. Dyspnea on minimal exertion  Assessment & Plan:  Gets very winded on minimal exertion. But takes rest in between activities. Takes about 1-2 minutes for his breathlessness with lightheadedness to ease up  Encouraged to use the oxygen with activities  He has completed cardiac rehab but did not help  Discussed use of opioid for MCGILL as well as their side effects. I do not believe he is appropriate for this yet and they agree. They do not want to start this yet. He does not feel like he is at this point yet. Additionally. His safety is still a concern at this time since he continues to live by himself      3. Counseling regarding advanced care planning and goals of care  Assessment & Plan:  Continues to do well despite decline as mentioned above  We will continue to follow peripherally  ACP completed on last visit -     Patient renewed his living will/POA. He names his daughter as his POA. We requested a copy to scan in his chart.   Patient continues to display good understanding, insight, and judgement  into his medical condition. His daughter does as well and is very supportive of her father. He understands and volunteers his understanding that he will only worsen over time, this is only a matter of when.   Since their last visit, they have already looked into nursing home and is strongly considering [facility] as a last option.  But he really wants to stay at home is much as possible and would hire extra help at home first instead of moving to a nursing home.  But they are not closed off to a nursing home if absolutely needed      Follow up  RTO in 6 months, call sooner if needed    Requested Prescriptions      No prescriptions requested or ordered in this encounter     There are no discontinued medications.    Representatives have queried the patient's controlled substance dispensing history in the Prescription Drug Monitoring Program in compliance with regulations before I have prescribed any controlled substances.  The prescription history is consistent with prescribed therapy and our practice policies.      30 minutes were spent face to face with Pilo Manriquez and his daughter with greater than 50% of the time spent in counseling or coordination of care including discussions of etiology of diagnosis, pathogenesis of diagnosis, prognosis of diagnosis, diagnostic results, impression, and recommendations, risks and benefits of treatment, instructions for disease self management, treatment instructions, follow up requirements, risk factors and risk reduction of disease, patient and family counseling/involvement in care and compliance with treatment regimen, advanced care planning.  All of the patient's questions were answered during this discussion.    No follow-ups on file.    Subjective:   Chief Complaint  Follow up visit for:  goal of care assessment and decisional support, disease process education and discussion of prognosis, advance care planning, emotional support in the setting of  serious illness  HPI     Pilo Manriquez is a 78 y.o. male with ILD, pulmonary HTN, and chronic hypoxic respiratory failure who is on baseline 3L O2 at home. Currently on Ofev and Tyvaso. He was referred to palliative care by pulmonology given what appears to be worsening symptoms.     He returns today with his daughter. He appears stable. He is wearing his oxygen in the office at 5L via NC. He reports steady but slow decline in overall health, especially noticed continued worsening of breathlessness/MCGILL. But overall, still doing relatively well given situation. I discussed use of low dose oxycodone to help with severe breathlessness which he denies currently. They are also understandable worried about side effects, and I do share the same concern especially since he lives by himself. At this tme, there is still no indication to begin opioid therapy at this time and the risks >> benefits currently. However, should he develop severe MCGILL, we can begin low dose oxycodone prn.    He now seems more open to using assistive devices to make his life easier. Previously, he was not even considering using his cane while grocery shopping. Now he said he tried the electronic scooter inside the store which he found very helpful.     He gets very winded with chores in the house - examples provided include taking out the trash from the garage to the front of his house for  (O2 drops to the high 60s because he doesn't wear his O2 while doing this) or while washing the dishes. He pauses in between activities and finds relief after 1-2 mins of rest. I advise him to wear his O2 at all times.    Symptom/Support/Living will/Living situation as per previous discussion:    In terms of support, they have a relatively good system in place. His daughter lives 25 minutes away but checks in on him frequently throughout the day, everyday. He calls her too if he needs anything. His sister is also checking in on him. He also receives [employer] once  a week who helps with light chores at home. They are planning on asking this company to check his home for safety and to suggest any house modifications, if needed to ensure his safety. I encouraged him to ask us or his PCP for any DMEs he may need at home. Right now, he has no other needs.     We explained the role of palliative in symptom management of his dyspnea. Advised that we rely on opioids and BZDs to manage this but that they come with unavoidable side effects. If it gets to the point where interventions from pulmo or cardio are no longer sufficient to provide him comfort, then opioids become necessary, outweighing risk and benefits. I do not recommend opioids at this time. And they both agreed.     We again spent time discussing his living situation when he no longer can continue living independently. We discussed possible transfer to a nursing facility if appropriate. His daughter's place appears to still be an option as a last resort, but he expressed interest in a facility. I advised them to start looking for a place because there could be months-long wait. They appreciate this suggestion    We again discussed hospice as an option if the goal switches to pure comfort at home. Discussed that hospice can be provided at home as long as they have people in place to provide 24/7 cares at home in addition to hospice team who will not be present 24/7 at home.     His wife  last year after a short dawn with multiple myeloma. She was on hospice for a few days before passing away. He does have some understanding of hospice.    The following portions of the medical history were reviewed: past medical history, problem list, medication list, and social history.    Current Outpatient Medications:     ciclopirox (LOPROX) 0.77 % cream, PLEASE SEE ATTACHED FOR DETAILED DIRECTIONS, Disp: , Rfl:     finasteride (PROSCAR) 5 mg tablet, TAKE 1 TABLET BY MOUTH EVERY DAY, Disp: 90 tablet, Rfl: 1    FLUoxetine (PROzac)  20 mg capsule, TAKE 1 CAPSULE BY MOUTH EVERY DAY, Disp: 90 capsule, Rfl: 2    fluticasone (FLONASE) 50 mcg/act nasal spray, 2 sprays into each nostril daily, Disp: 48 mL, Rfl: 3    megestrol (MEGACE ES) 625 MG/5ML suspension, Take 5 mL (625 mg total) by mouth daily, Disp: 450 mL, Rfl: 3    Multiple Vitamin (MULTI-DAY VITAMINS) TABS, Take 1 tablet by mouth daily, Disp: , Rfl:     Ofev 150 MG CAPS capsule, TAKE 1 CAPSULE BY MOUTH TWICE DAILY WITH FOOD., Disp: 60 capsule, Rfl: 11    sildenafil (REVATIO) 20 mg tablet, Take 0.5 tablets (10 mg total) by mouth 3 (three) times a day, Disp: 90 tablet, Rfl: 5    tamsulosin (FLOMAX) 0.4 mg, TAKE 2 CAPSULES BY MOUTH DAILY WITH DINNER.., Disp: 180 capsule, Rfl: 1    temazepam (RESTORIL) 30 mg capsule, TAKE 1 CAPSULE (30 MG TOTAL) BY MOUTH DAILY AT BEDTIME, Disp: 30 capsule, Rfl: 5    Treprostinil 64 MCG POWD, Inhale 64 mcg 4 (four) times a day, Disp: 112 each, Rfl: 11    fexofenadine (ALLEGRA) 180 MG tablet, Take 1 tablet (180 mg total) by mouth daily, Disp: 30 tablet, Rfl: 3  Review of Systems   Constitutional:  Positive for activity change and fatigue. Negative for appetite change and unexpected weight change.   HENT:  Negative for trouble swallowing.    Respiratory:  Positive for shortness of breath.         Dyspnea on minimal exertion   Cardiovascular:  Negative for chest pain.   Gastrointestinal:  Negative for abdominal pain, constipation, diarrhea, nausea and vomiting.   Musculoskeletal:  Negative for back pain.   Neurological:  Positive for weakness.   Psychiatric/Behavioral:  Negative for sleep disturbance. The patient is not nervous/anxious.    All other systems reviewed and are negative.      All other systems negative    Objective:  Vital Signs  /70 (BP Location: Left arm, Patient Position: Sitting, Cuff Size: Standard)   Pulse 71   Temp 97.5 °F (36.4 °C) (Temporal)   Wt 79.8 kg (176 lb)   SpO2 90%   BMI 20.87 kg/m²    Physical Exam    Constitutional:  Appears well-developed and well-nourished. Appears thin, chronically ill looking but not toxic appearing. Well kempt, pleasant, jovial. Appears overall stable. In no acute physical or emotional distress.   Head: Normocephalic and atraumatic. Temporal mm wasting  Eyes: EOM are normal. No ocular discharge. No scleral icterus.   Neck: No visible adenopathy or masses  Respiratory: Effort normal. No stridor. No respiratory distress. On 5L NC. Can complete long sentences without shortness of breath  Gastrointestinal: No abdominal distension.   Musculoskeletal: No edema.   Neurological: Alert, oriented and appropriately conversant.   Skin: No diaphoresis, no rashes seen on exposed areas of skin. Pale  Psychiatric: Displays a normal mood and affect. Behavior, judgement and thought content appear normal.     Alondra Khan MD  Palliative Medicine & Supportive Care  Internal Medicine  Available via [employer]  Office: [phone]  Fax: [phone]

## 2024-03-04 NOTE — ASSESSMENT & PLAN NOTE
Reports steady, slow decline in the last 6 months especially in terms of breathlessness with activities, but he is now more open to finding and using ways to adapt to his slowly changing baseline (more open to resting in between activities, more willing to use a motorized scooter at the grocery, etc)  Despite slow decline, patient is relatively stable and is content with his quality of life  Continue close follow up with pulmo.

## 2024-03-04 NOTE — ASSESSMENT & PLAN NOTE
Continues to do well despite decline as mentioned above  We will continue to follow peripherally  ACP completed on last visit -     Patient renewed his living will/POA. He names his daughter as his POA. We requested a copy to scan in his chart.   Patient continues to display good understanding, insight, and judgement into his medical condition. His daughter does as well and is very supportive of her father. He understands and volunteers his understanding that he will only worsen over time, this is only a matter of when.   Since their last visit, they have already looked into nursing home and is strongly considering [facility] as a last option.  But he really wants to stay at home is much as possible and would hire extra help at home first instead of moving to a nursing home.  But they are not closed off to a nursing home if absolutely needed

## 2024-03-25 ENCOUNTER — TELEPHONE (OUTPATIENT)
Dept: PULMONOLOGY | Facility: CLINIC | Age: 79
End: 2024-03-25

## 2024-03-27 ENCOUNTER — TREATMENT (OUTPATIENT)
Dept: FAMILY MEDICINE CLINIC | Facility: CLINIC | Age: 79
End: 2024-03-27

## 2024-03-27 DIAGNOSIS — G47.00 INSOMNIA, UNSPECIFIED TYPE: ICD-10-CM

## 2024-03-27 RX ORDER — TEMAZEPAM 30 MG/1
30 CAPSULE ORAL
Qty: 90 CAPSULE | Refills: 3 | Status: SHIPPED | OUTPATIENT
Start: 2024-03-27 | End: 2024-03-29 | Stop reason: SDUPTHER

## 2024-03-27 NOTE — TELEPHONE ENCOUNTER
OFEV approved  1/25/2024-3/24/2025  AUTH#: [id number]  [id number]-[phone]    Spoke with [facility], set up pt's profile and gave verbal prescription information. Spoke with pt, informed him of approval and of [facility] contacting him to set up delivery.

## 2024-03-29 DIAGNOSIS — G47.00 INSOMNIA, UNSPECIFIED TYPE: ICD-10-CM

## 2024-03-29 RX ORDER — TEMAZEPAM 30 MG/1
30 CAPSULE ORAL
Qty: 30 CAPSULE | Refills: 3 | Status: SHIPPED | OUTPATIENT
Start: 2024-03-29

## 2024-04-02 ENCOUNTER — OFFICE VISIT (OUTPATIENT)
Dept: PULMONOLOGY | Facility: CLINIC | Age: 79
End: 2024-04-02
Payer: MEDICARE

## 2024-04-02 VITALS
SYSTOLIC BLOOD PRESSURE: 112 MMHG | OXYGEN SATURATION: 97 % | HEIGHT: 77 IN | WEIGHT: 176 LBS | DIASTOLIC BLOOD PRESSURE: 72 MMHG | HEART RATE: 96 BPM | BODY MASS INDEX: 20.78 KG/M2

## 2024-04-02 DIAGNOSIS — F32.A DEPRESSION, UNSPECIFIED DEPRESSION TYPE: ICD-10-CM

## 2024-04-02 DIAGNOSIS — I27.20 PULMONARY HYPERTENSION (HCC): ICD-10-CM

## 2024-04-02 DIAGNOSIS — R06.09 DOE (DYSPNEA ON EXERTION): ICD-10-CM

## 2024-04-02 DIAGNOSIS — J96.11 CHRONIC HYPOXEMIC RESPIRATORY FAILURE (HCC): ICD-10-CM

## 2024-04-02 DIAGNOSIS — J84.9 ILD (INTERSTITIAL LUNG DISEASE) (HCC): Primary | ICD-10-CM

## 2024-04-02 DIAGNOSIS — J30.89 NON-SEASONAL ALLERGIC RHINITIS, UNSPECIFIED TRIGGER: ICD-10-CM

## 2024-04-02 PROCEDURE — 99214 OFFICE O/P EST MOD 30 MIN: CPT | Performed by: INTERNAL MEDICINE

## 2024-04-02 NOTE — PROGRESS NOTES
"Office Progress Note - Pulmonary    Pilo Manriquez 78 y.o. male MRN: [id number]    Encounter: [id number]      Assessment:  Idiopathic pulmonary fibrosis.  Pulmonary hypertension.  Chronic hypoxemic respiratory failure.  Dyspnea on exertion.  Depression.  Allergic rhinitis.    Plan:   Ofev 150 mg twice a day.  Tyvaso.  Oxygen supplement.  Regular exercise.  Follow-up in 3 months.    Discussion:   The patient's overall pulmonary status has been stable.  His worsening dyspnea on exertion is mainly due to deconditioning as he stopped exercising.  We had a long discussion about how exercise can improve his stamina and breathing.  He will try to restart slow and build up the stamina.  We may consider sending again to pulmonary rehab.  I have maintained him on the Ofev 150 mg p.o. twice daily.  He will continue taking the Tyvaso.  He can increase the oxygen flow to 5 L with activities.  I will see him in 3 months.      Subjective:   The patient is here for a follow-up visit.  He feels more out of breath with activities.  He stopped exercising.  He stopped exercising because he did not feel motivated.  No significant cough or sputum production.  Denies pain.  He is on the oxygen 24 hours a day.  He has gained weight.  He is more active socially.  He is on the Ofev 150 mg p.o. twice daily.  No significant side effects.    Review of systems:  A 12 point system review is done and aside from what is stated above the rest of the review of systems is negative.      Family history and social history are reviewed.    Medications list is reviewed.      Vitals: Blood pressure 112/72, pulse 96, height 6' 5\" (1.956 m), weight 79.8 kg (176 lb), SpO2 97%.,     Physical Exam  Gen: Awake, alert, oriented x 3, no acute distress  HEENT: Mucous membranes moist, no oral lesions, no thrush  NECK: No accessory muscle use, JVP not elevated  Cardiac: Regular, single S1, single S2, no murmurs, no rubs, no gallops  Lungs: Bilateral posterior crackles. "  No wheezing.  Abdomen: normoactive bowel sounds, soft nontender, nondistended, no rebound or rigidity, no guarding  Extremities: no cyanosis, no clubbing, no edema  Neuro:  Grossly nonfocal.  Skin:  No rash.    Lab Results   Component Value Date    WBC 5.24 10/17/2023    HGB 12.6 10/17/2023    HCT 38.5 10/17/2023     (H) 10/17/2023     10/17/2023     Lab Results   Component Value Date    SODIUM 137 10/17/2023    K 3.9 10/17/2023     10/17/2023    CO2 30 10/17/2023    BUN 20 10/17/2023    CREATININE 0.62 10/17/2023    GLUC 104 10/18/2022    CALCIUM 9.0 10/17/2023

## 2024-04-02 NOTE — TELEPHONE ENCOUNTER
Received notification that pt's OFEV is being filled through [facility] specialty pharmacy. Pt is already set up with [facility] and received a delivery of his OFEV on 3/26/2024

## 2024-04-03 ENCOUNTER — HOSPITAL ENCOUNTER (OUTPATIENT)
Dept: NON INVASIVE DIAGNOSTICS | Facility: HOSPITAL | Age: 79
Discharge: HOME | End: 2024-04-03
Attending: INTERNAL MEDICINE
Payer: MEDICARE

## 2024-04-03 VITALS
HEIGHT: 77 IN | HEART RATE: 65 BPM | SYSTOLIC BLOOD PRESSURE: 109 MMHG | DIASTOLIC BLOOD PRESSURE: 55 MMHG | WEIGHT: 176 LBS | BODY MASS INDEX: 20.78 KG/M2

## 2024-04-03 DIAGNOSIS — J84.9 ILD (INTERSTITIAL LUNG DISEASE) (HCC): Chronic | ICD-10-CM

## 2024-04-03 DIAGNOSIS — I27.20 PULMONARY HYPERTENSION (HCC): ICD-10-CM

## 2024-04-03 PROCEDURE — 93306 TTE W/DOPPLER COMPLETE: CPT

## 2024-04-03 PROCEDURE — 93306 TTE W/DOPPLER COMPLETE: CPT | Performed by: INTERNAL MEDICINE

## 2024-04-04 LAB
AORTIC ROOT: 4.2 CM
AORTIC VALVE MEAN VELOCITY: 7.4 M/S
APICAL FOUR CHAMBER EJECTION FRACTION: 46 %
ASCENDING AORTA: 3.9 CM
AV AREA BY CONTINUOUS VTI: 3 CM2
AV AREA PEAK VELOCITY: 2.8 CM2
AV LVOT MEAN GRADIENT: 1 MMHG
AV LVOT PEAK GRADIENT: 2 MMHG
AV MEAN GRADIENT: 2 MMHG
AV PEAK GRADIENT: 4 MMHG
AV VALVE AREA: 2.99 CM2
AV VELOCITY RATIO: 0.74
BSA FOR ECHO PROCEDURE: 2.12 M2
DOP CALC AO PEAK VEL: 1.01 M/S
DOP CALC AO VTI: 24.02 CM
DOP CALC LVOT AREA: 3.8 CM2
DOP CALC LVOT DIAMETER: 2.2 CM
DOP CALC LVOT PEAK VEL VTI: 18.9 CM
DOP CALC LVOT PEAK VEL: 0.75 M/S
DOP CALC LVOT STROKE INDEX: 35.4 ML/M2
DOP CALC LVOT STROKE VOLUME: 71.81
E WAVE DECELERATION TIME: 386 MS
E/A RATIO: 0.56
FRACTIONAL SHORTENING: 35 (ref 28–44)
INTERVENTRICULAR SEPTUM IN DIASTOLE (PARASTERNAL SHORT AXIS VIEW): 0.9 CM
INTERVENTRICULAR SEPTUM: 0.9 CM (ref 0.6–1.1)
LAAS-AP2: 16.9 CM2
LAAS-AP4: 12.5 CM2
LEFT ATRIUM SIZE: 3.5 CM
LEFT ATRIUM VOLUME (MOD BIPLANE): 33 ML
LEFT ATRIUM VOLUME INDEX (MOD BIPLANE): 15.6 ML/M2
LEFT INTERNAL DIMENSION IN SYSTOLE: 3.2 CM (ref 2.1–4)
LEFT VENTRICULAR INTERNAL DIMENSION IN DIASTOLE: 4.9 CM (ref 3.5–6)
LEFT VENTRICULAR POSTERIOR WALL IN END DIASTOLE: 0.9 CM
LEFT VENTRICULAR STROKE VOLUME: 72 ML
LVSV (TEICH): 72 ML
MV E'TISSUE VEL-LAT: 9 CM/S
MV E'TISSUE VEL-SEP: 7 CM/S
MV PEAK A VEL: 0.72 M/S
MV PEAK E VEL: 40 CM/S
RA PRESSURE ESTIMATED: 3 MMHG
RIGHT ATRIAL 2D VOLUME: 61 ML
RIGHT ATRIUM AREA SYSTOLE A4C: 20.4 CM2
RIGHT VENTRICLE ID DIMENSION: 4.6 CM
RV PSP: 25 MMHG
SL CV LEFT ATRIUM LENGTH A2C: 5 CM
SL CV LV EF: 55
SL CV PED ECHO LEFT VENTRICLE DIASTOLIC VOLUME (MOD BIPLANE) 2D: 114 ML
SL CV PED ECHO LEFT VENTRICLE SYSTOLIC VOLUME (MOD BIPLANE) 2D: 41 ML
TR MAX PG: 22 MMHG
TR PEAK VELOCITY: 2.4 M/S
TRICUSPID ANNULAR PLANE SYSTOLIC EXCURSION: 2.2 CM
TRICUSPID VALVE PEAK REGURGITATION VELOCITY: 2.36 M/S

## 2024-05-02 ENCOUNTER — TELEPHONE (OUTPATIENT)
Age: 79
End: 2024-05-02

## 2024-05-02 DIAGNOSIS — R63.4 WEIGHT LOSS: ICD-10-CM

## 2024-05-02 NOTE — TELEPHONE ENCOUNTER
Pilo Xavier    Patient needed the Specialty Pharmacy # to call to get a new refill.    [facility] - [place], [place] - [address]  [address], [facility] [address]  Phone: [phone]  Fax: [phone]  CLEMENTE #: --

## 2024-05-02 NOTE — TELEPHONE ENCOUNTER
Reason for call:   [x] Refill   [] Prior Auth  [] Other:     Office:   [x] PCP/Provider -   [] Specialty/Provider -     Medication: megestrol (MEGACE ES) 625 MG/5ML suspension     Dose/Frequency: Take 5 mL (625 mg total) by mouth daily     Quantity: 450 ML     Pharmacy: [facility] [place] - [facility] [phone]     Does the patient have enough for 3 days?   [x] Yes   [] No - Send as HP to POD

## 2024-05-05 RX ORDER — MEGESTROL ACETATE 125 MG/ML
625 SUSPENSION ORAL DAILY
Qty: 450 ML | Refills: 3 | Status: SHIPPED | OUTPATIENT
Start: 2024-05-05

## 2024-05-20 DIAGNOSIS — G47.00 INSOMNIA, UNSPECIFIED TYPE: ICD-10-CM

## 2024-05-20 RX ORDER — TEMAZEPAM 30 MG/1
30 CAPSULE ORAL
Qty: 30 CAPSULE | Refills: 5 | Status: SHIPPED | OUTPATIENT
Start: 2024-05-20

## 2024-06-03 DIAGNOSIS — R63.4 WEIGHT LOSS: ICD-10-CM

## 2024-06-03 RX ORDER — MEGESTROL ACETATE 125 MG/ML
625 SUSPENSION ORAL DAILY
Qty: 450 ML | Refills: 3 | Status: SHIPPED | OUTPATIENT
Start: 2024-06-03

## 2024-06-12 ENCOUNTER — TELEPHONE (OUTPATIENT)
Age: 79
End: 2024-06-12

## 2024-06-12 NOTE — TELEPHONE ENCOUNTER
Needed updated pharmacy benefit information.         Please provide your updated pharmacy insurance information so a Prior Authorization can be completed for your medication Megestrol.    Please provide the BIN number, PCN number, RX GROUP number, ID NUMBER, the NAME  of the insurance and possibly a Provider Phone number.      Thank you and have a most wonderful day.      [facility] Auth Team

## 2024-06-14 ENCOUNTER — RA CDI HCC (OUTPATIENT)
Dept: OTHER | Facility: HOSPITAL | Age: 79
End: 2024-06-14

## 2024-06-14 DIAGNOSIS — Z86.16 PERSONAL HISTORY OF COVID-19: Primary | Chronic | ICD-10-CM

## 2024-06-14 PROBLEM — Z71.89 COUNSELING REGARDING ADVANCED CARE PLANNING AND GOALS OF CARE: Status: RESOLVED | Noted: 2022-12-08 | Resolved: 2024-06-14

## 2024-06-20 ENCOUNTER — OFFICE VISIT (OUTPATIENT)
Dept: FAMILY MEDICINE CLINIC | Facility: CLINIC | Age: 79
End: 2024-06-20
Payer: MEDICARE

## 2024-06-20 VITALS
TEMPERATURE: 97.2 F | HEART RATE: 75 BPM | HEIGHT: 77 IN | BODY MASS INDEX: 20.8 KG/M2 | DIASTOLIC BLOOD PRESSURE: 60 MMHG | SYSTOLIC BLOOD PRESSURE: 100 MMHG | WEIGHT: 176.2 LBS | OXYGEN SATURATION: 75 %

## 2024-06-20 DIAGNOSIS — N40.1 BPH WITH OBSTRUCTION/LOWER URINARY TRACT SYMPTOMS: ICD-10-CM

## 2024-06-20 DIAGNOSIS — J96.11 CHRONIC HYPOXEMIC RESPIRATORY FAILURE (HCC): ICD-10-CM

## 2024-06-20 DIAGNOSIS — N13.8 BPH WITH OBSTRUCTION/LOWER URINARY TRACT SYMPTOMS: ICD-10-CM

## 2024-06-20 DIAGNOSIS — R63.4 ABNORMAL WEIGHT LOSS: Chronic | ICD-10-CM

## 2024-06-20 DIAGNOSIS — F51.01 PRIMARY INSOMNIA: Chronic | ICD-10-CM

## 2024-06-20 DIAGNOSIS — J84.9 ILD (INTERSTITIAL LUNG DISEASE) (HCC): Chronic | ICD-10-CM

## 2024-06-20 DIAGNOSIS — I27.20 PULMONARY HYPERTENSION (HCC): Primary | ICD-10-CM

## 2024-06-20 DIAGNOSIS — E44.1 MILD PROTEIN-CALORIE MALNUTRITION (HCC): ICD-10-CM

## 2024-06-20 DIAGNOSIS — D64.89 ANEMIA DUE TO OTHER CAUSE, NOT CLASSIFIED: Chronic | ICD-10-CM

## 2024-06-20 PROBLEM — J34.89 DRY NOSE: Status: RESOLVED | Noted: 2021-12-27 | Resolved: 2024-06-20

## 2024-06-20 PROBLEM — R06.09 DYSPNEA ON MINIMAL EXERTION: Status: RESOLVED | Noted: 2023-03-08 | Resolved: 2024-06-20

## 2024-06-20 PROCEDURE — 99214 OFFICE O/P EST MOD 30 MIN: CPT | Performed by: FAMILY MEDICINE

## 2024-06-20 PROCEDURE — G2211 COMPLEX E/M VISIT ADD ON: HCPCS | Performed by: FAMILY MEDICINE

## 2024-06-20 NOTE — PROGRESS NOTES
"Assessment/Plan:    No problem-specific Assessment & Plan notes found for this encounter.             Subjective:      Patient ID: Pilo Manriquez is a 79 y.o. male.      PATIENT RETURNS FOR FOLLOW-UP OF CHRONIC MEDICAL CONDITIONS.  ANY HOSPITAL VISITS, EMERGENCY VISITS AND OTHER PROVIDER VISITS SINCE LAST TIME WERE REVIEWED.  MEDS WERE REVIEWED AND NO SIDE EFFECTS.  NO NEW ISSUES  UNLESS NOTED BELOW. NO NEW MEDICAL PROVIDER REPORTED. THE CHRONIC DISEASES LISTED ABOVE ARE STABLE AND UNCHANGED/ THE PLAN OF CARE FOR THOSE WILL REMAIN UNCHANGED UNLESS NOTED BELOW.      Chronic cough 2/2 ILD same ;     Weight has improved 12 # since September : now in normal BMI albeit barely ; having issues getting Megace covered .     Energy reduced as expected .         The following portions of the patient's history were reviewed and updated as appropriate: allergies, current medications, past family history, past medical history, past social history, past surgical history and problem list.    Review of Systems   Constitutional:  Positive for fatigue. Negative for activity change and appetite change.   HENT:  Negative for trouble swallowing.    Eyes:  Negative for visual disturbance.   Respiratory:  Positive for cough and shortness of breath.    Cardiovascular:  Negative for chest pain, palpitations and leg swelling.   Gastrointestinal:  Negative for abdominal pain and blood in stool.   Endocrine: Negative for polyuria.   Genitourinary:  Negative for difficulty urinating and hematuria.   Skin:  Negative for rash.   Neurological:  Negative for dizziness.   Psychiatric/Behavioral:  Negative for behavioral problems.          Objective:  Vitals:    06/20/24 1058   BP: 100/60   BP Location: Left arm   Patient Position: Sitting   Cuff Size: Large   Pulse: 75   Temp: (!) 97.2 °F (36.2 °C)   SpO2: (!) 75%   Weight: 79.9 kg (176 lb 3.2 oz)   Height: 6' 5\" (1.956 m)      Physical Exam  Constitutional:       Appearance: Normal appearance. "   HENT:      Head: Normocephalic and atraumatic.      Right Ear: Tympanic membrane and ear canal normal.      Left Ear: Tympanic membrane and ear canal normal.      Mouth/Throat:      Mouth: Mucous membranes are moist.      Pharynx: Oropharynx is clear.   Eyes:      Conjunctiva/sclera: Conjunctivae normal.   Cardiovascular:      Rate and Rhythm: Normal rate and regular rhythm.      Pulses: Normal pulses.      Heart sounds: Normal heart sounds. No murmur heard.  Pulmonary:      Effort: Pulmonary effort is normal. No respiratory distress.      Breath sounds: Normal breath sounds.   Musculoskeletal:      Cervical back: Neck supple.   Lymphadenopathy:      Cervical: No cervical adenopathy.   Neurological:      Mental Status: He is alert. Mental status is at baseline.   Psychiatric:         Mood and Affect: Mood normal.         Thought Content: Thought content normal.           Patient's chronic problems that were reviewed today are stable. Recent hospital stays reviewed. Recent labs and imaging reviewed. Recent visits to other providers reviewed. Meds reviewed and no changes made. Appropriate labs and imaging were ordered. Preventive measures appropriate for age and sex were reviewed with patient. Immunizations were updated as appropriate.

## 2024-06-20 NOTE — PATIENT INSTRUCTIONS
I do recommend updating your COVID booster sometime this summer.    I would consider stopping Megace at this time.  Weigh yourself every week.  Same garments for the same time of day.  If you find your weight has dropped more than 5 pounds call us and we will reinstitute the Megace.    Also read up on the flu shot in October or November.

## 2024-06-28 ENCOUNTER — APPOINTMENT (OUTPATIENT)
Dept: LAB | Facility: MEDICAL CENTER | Age: 79
End: 2024-06-28
Payer: MEDICARE

## 2024-06-28 DIAGNOSIS — J84.9 ILD (INTERSTITIAL LUNG DISEASE) (HCC): Chronic | ICD-10-CM

## 2024-06-28 DIAGNOSIS — D64.89 ANEMIA DUE TO OTHER CAUSE, NOT CLASSIFIED: Chronic | ICD-10-CM

## 2024-06-28 DIAGNOSIS — I27.20 PULMONARY HYPERTENSION (HCC): ICD-10-CM

## 2024-06-28 LAB
ALBUMIN SERPL BCG-MCNC: 3.1 G/DL (ref 3.5–5)
ALP SERPL-CCNC: 55 U/L (ref 34–104)
ALT SERPL W P-5'-P-CCNC: 9 U/L (ref 7–52)
ANION GAP SERPL CALCULATED.3IONS-SCNC: 8 MMOL/L (ref 4–13)
AST SERPL W P-5'-P-CCNC: 17 U/L (ref 13–39)
BASOPHILS # BLD AUTO: 0.02 THOUSANDS/ÂΜL (ref 0–0.1)
BASOPHILS NFR BLD AUTO: 0 % (ref 0–1)
BILIRUB SERPL-MCNC: 0.34 MG/DL (ref 0.2–1)
BUN SERPL-MCNC: 17 MG/DL (ref 5–25)
CALCIUM ALBUM COR SERPL-MCNC: 9.8 MG/DL (ref 8.3–10.1)
CALCIUM SERPL-MCNC: 9.1 MG/DL (ref 8.4–10.2)
CHLORIDE SERPL-SCNC: 101 MMOL/L (ref 96–108)
CO2 SERPL-SCNC: 31 MMOL/L (ref 21–32)
CREAT SERPL-MCNC: 0.62 MG/DL (ref 0.6–1.3)
EOSINOPHIL # BLD AUTO: 0.22 THOUSAND/ÂΜL (ref 0–0.61)
EOSINOPHIL NFR BLD AUTO: 4 % (ref 0–6)
ERYTHROCYTE [DISTWIDTH] IN BLOOD BY AUTOMATED COUNT: 12.8 % (ref 11.6–15.1)
GFR SERPL CREATININE-BSD FRML MDRD: 95 ML/MIN/1.73SQ M
GLUCOSE P FAST SERPL-MCNC: 76 MG/DL (ref 65–99)
HCT VFR BLD AUTO: 38.7 % (ref 36.5–49.3)
HGB BLD-MCNC: 12 G/DL (ref 12–17)
IMM GRANULOCYTES # BLD AUTO: 0.01 THOUSAND/UL (ref 0–0.2)
IMM GRANULOCYTES NFR BLD AUTO: 0 % (ref 0–2)
LYMPHOCYTES # BLD AUTO: 1.3 THOUSANDS/ÂΜL (ref 0.6–4.47)
LYMPHOCYTES NFR BLD AUTO: 26 % (ref 14–44)
MCH RBC QN AUTO: 31.5 PG (ref 26.8–34.3)
MCHC RBC AUTO-ENTMCNC: 31 G/DL (ref 31.4–37.4)
MCV RBC AUTO: 102 FL (ref 82–98)
MONOCYTES # BLD AUTO: 0.45 THOUSAND/ÂΜL (ref 0.17–1.22)
MONOCYTES NFR BLD AUTO: 9 % (ref 4–12)
NEUTROPHILS # BLD AUTO: 2.99 THOUSANDS/ÂΜL (ref 1.85–7.62)
NEUTS SEG NFR BLD AUTO: 61 % (ref 43–75)
NRBC BLD AUTO-RTO: 0 /100 WBCS
PLATELET # BLD AUTO: 238 THOUSANDS/UL (ref 149–390)
PMV BLD AUTO: 10 FL (ref 8.9–12.7)
POTASSIUM SERPL-SCNC: 3.7 MMOL/L (ref 3.5–5.3)
PROT SERPL-MCNC: 7.7 G/DL (ref 6.4–8.4)
PSA SERPL-MCNC: 0.63 NG/ML (ref 0–4)
RBC # BLD AUTO: 3.81 MILLION/UL (ref 3.88–5.62)
SODIUM SERPL-SCNC: 140 MMOL/L (ref 135–147)
WBC # BLD AUTO: 4.99 THOUSAND/UL (ref 4.31–10.16)

## 2024-06-28 PROCEDURE — 85025 COMPLETE CBC W/AUTO DIFF WBC: CPT

## 2024-06-28 PROCEDURE — 80053 COMPREHEN METABOLIC PANEL: CPT

## 2024-06-29 DIAGNOSIS — F32.A DEPRESSION, UNSPECIFIED DEPRESSION TYPE: ICD-10-CM

## 2024-06-29 RX ORDER — FLUOXETINE HYDROCHLORIDE 20 MG/1
CAPSULE ORAL
Qty: 90 CAPSULE | Refills: 1 | Status: SHIPPED | OUTPATIENT
Start: 2024-06-29

## 2024-07-10 ENCOUNTER — OFFICE VISIT (OUTPATIENT)
Dept: PULMONOLOGY | Facility: CLINIC | Age: 79
End: 2024-07-10
Payer: MEDICARE

## 2024-07-10 VITALS
TEMPERATURE: 97.4 F | SYSTOLIC BLOOD PRESSURE: 114 MMHG | WEIGHT: 174 LBS | BODY MASS INDEX: 20.55 KG/M2 | HEIGHT: 77 IN | HEART RATE: 66 BPM | OXYGEN SATURATION: 95 % | DIASTOLIC BLOOD PRESSURE: 62 MMHG

## 2024-07-10 DIAGNOSIS — I27.20 PULMONARY HYPERTENSION (HCC): ICD-10-CM

## 2024-07-10 DIAGNOSIS — J84.9 ILD (INTERSTITIAL LUNG DISEASE) (HCC): Primary | ICD-10-CM

## 2024-07-10 DIAGNOSIS — F32.A DEPRESSION, UNSPECIFIED DEPRESSION TYPE: ICD-10-CM

## 2024-07-10 DIAGNOSIS — R06.09 DOE (DYSPNEA ON EXERTION): ICD-10-CM

## 2024-07-10 DIAGNOSIS — J96.11 CHRONIC HYPOXEMIC RESPIRATORY FAILURE (HCC): ICD-10-CM

## 2024-07-10 DIAGNOSIS — G47.33 OSA (OBSTRUCTIVE SLEEP APNEA): ICD-10-CM

## 2024-07-10 PROCEDURE — G2211 COMPLEX E/M VISIT ADD ON: HCPCS | Performed by: INTERNAL MEDICINE

## 2024-07-10 PROCEDURE — 99214 OFFICE O/P EST MOD 30 MIN: CPT | Performed by: INTERNAL MEDICINE

## 2024-07-10 NOTE — PROGRESS NOTES
"Office Progress Note - Pulmonary    Pilo Manriquez 79 y.o. male MRN: [id number]    Encounter: [id number]      Assessment:  Idiopathic pulmonary fibrosis.  Dyspnea on exertion.  Chronic hypoxemic respiratory failure.  Pulmonary hypertension.  Obstructive sleep apnea.  Depression.    Plan:   Oxygen supplement 24 hours a day.  Regular walks daily.  Ofev 150 mg twice a day.  Tyvaso daily.  Fluoxetine 20 mg once a day.  Follow-up in 3 months.    Discussion:   The patient is overall pulmonary status is stable.  We discussed the nature of the disease and the objectives of the treatment.  I strongly feel that the Ofev has slow down the progression of the pulmonary fibrosis.  He is still able to live independent and enjoy living.  We talked about the importance of going out and try to live as normal as possible.  He will use the oxygen 24 hours a day.  He will continue with the Ofev 150 mg twice a day.  He is tolerating it well.  Also he will continue with the 2 vaso-.  I have maintained him on the fluoxetine 20 mg once a day.  I will see him in 3 months.      Subjective:   The patient is here for a follow-up visit.  He is accompanied by his daughter today.  He tells me he feels more out of breath when he is exerting himself.  Also noticed that he is not recovering as quick when he desaturates.  He has chronic cough which has not changed.  Denies pain.  He sleeps fairly well.  His appetite is better in the morning but he is able to eat and maintain his weight.  Megace was stopped.    Review of systems:  A 12 point system review is done and aside from what is stated above the rest of the review of systems is negative.      Family history and social history are reviewed.    Medications list is reviewed.      Vitals: Blood pressure 114/62, pulse 66, temperature (!) 97.4 °F (36.3 °C), temperature source Tympanic, height 6' 5\" (1.956 m), weight 78.9 kg (174 lb), SpO2 95%.,     Physical Exam  Gen: Awake, alert, oriented x 3, no " acute distress  HEENT: Mucous membranes moist, no oral lesions, no thrush  NECK: No accessory muscle use, JVP not elevated  Cardiac: Regular, single S1, single S2, no murmurs, no rubs, no gallops  Lungs: Bilateral posterior crackles.  No wheezing.  Abdomen: normoactive bowel sounds, soft nontender, nondistended, no rebound or rigidity, no guarding  Extremities: no cyanosis, no clubbing, no edema  Neuro:  Grossly nonfocal.  Skin:  No rash.    Lab Results   Component Value Date    WBC 4.99 06/28/2024    HGB 12.0 06/28/2024    HCT 38.7 06/28/2024     (H) 06/28/2024     06/28/2024     Lab Results   Component Value Date    SODIUM 140 06/28/2024    K 3.7 06/28/2024     06/28/2024    CO2 31 06/28/2024    BUN 17 06/28/2024    CREATININE 0.62 06/28/2024    GLUC 104 10/18/2022    CALCIUM 9.1 06/28/2024

## 2024-08-01 DIAGNOSIS — N40.1 BPH WITH OBSTRUCTION/LOWER URINARY TRACT SYMPTOMS: ICD-10-CM

## 2024-08-01 DIAGNOSIS — N13.8 BPH WITH OBSTRUCTION/LOWER URINARY TRACT SYMPTOMS: ICD-10-CM

## 2024-08-01 RX ORDER — TAMSULOSIN HYDROCHLORIDE 0.4 MG/1
CAPSULE ORAL
Qty: 180 CAPSULE | Refills: 1 | Status: SHIPPED | OUTPATIENT
Start: 2024-08-01

## 2024-08-23 DIAGNOSIS — N13.8 BPH WITH OBSTRUCTION/LOWER URINARY TRACT SYMPTOMS: ICD-10-CM

## 2024-08-23 DIAGNOSIS — N40.1 BPH WITH OBSTRUCTION/LOWER URINARY TRACT SYMPTOMS: ICD-10-CM

## 2024-08-25 RX ORDER — FINASTERIDE 5 MG/1
TABLET, FILM COATED ORAL
Qty: 90 TABLET | Refills: 1 | Status: SHIPPED | OUTPATIENT
Start: 2024-08-25

## 2024-09-05 ENCOUNTER — OFFICE VISIT (OUTPATIENT)
Dept: PALLIATIVE MEDICINE | Facility: CLINIC | Age: 79
End: 2024-09-05
Payer: MEDICARE

## 2024-09-05 VITALS
TEMPERATURE: 97.5 F | DIASTOLIC BLOOD PRESSURE: 60 MMHG | BODY MASS INDEX: 20.63 KG/M2 | OXYGEN SATURATION: 92 % | WEIGHT: 174 LBS | SYSTOLIC BLOOD PRESSURE: 120 MMHG | HEART RATE: 77 BPM

## 2024-09-05 DIAGNOSIS — J84.9 ILD (INTERSTITIAL LUNG DISEASE) (HCC): Primary | Chronic | ICD-10-CM

## 2024-09-05 DIAGNOSIS — Z71.89 COUNSELING REGARDING ADVANCED CARE PLANNING AND GOALS OF CARE: ICD-10-CM

## 2024-09-05 DIAGNOSIS — R06.09 DYSPNEA ON MINIMAL EXERTION: ICD-10-CM

## 2024-09-05 PROCEDURE — 99214 OFFICE O/P EST MOD 30 MIN: CPT | Performed by: INTERNAL MEDICINE

## 2024-09-05 PROCEDURE — G2211 COMPLEX E/M VISIT ADD ON: HCPCS | Performed by: INTERNAL MEDICINE

## 2024-09-05 NOTE — ASSESSMENT & PLAN NOTE
Continues to get very winded on minimal exertion.  Reports that it takes longer for him to recover from his breathlessness with lightheadedness   Of note, he has completed cardiac rehab but did not help  Discussed use of opioid and BZD again for MCGILL as well as their side effects. At this time, he and his daughter both do not believe that he needs to be on these medications yet.

## 2024-09-05 NOTE — ASSESSMENT & PLAN NOTE
Reports continued steady, slow decline in the last 6 months especially in terms of breathlessness with activities - he finds that he gets dyspneic and lightheaded quicker now and recovers a bit longer compared to 6 months prior. But he is coping by checking his O2 sats, resting in between and slowing down/taking his time with activities.   Despite slow decline, patient is relatively stable and is content with his quality of life  Continue close follow up with pulmo.

## 2024-09-05 NOTE — PROGRESS NOTES
Palliative and Supportive Care   Pilo Manriquez 79 y.o. male [id number]    Assessment/Plan:  1. ILD (interstitial lung disease) ([facility])    2. Counseling regarding advanced care planning and goals of care    3. Dyspnea on minimal exertion      Symptoms - continues to have worsening dyspnea with minimal exertion, more often now and takes longer to recover. But still manageable with rest in between activities. Not ready for opioid +/- BZD    1. ILD (interstitial lung disease) ([facility])  Assessment & Plan:  Reports continued steady, slow decline in the last 6 months especially in terms of breathlessness with activities - he finds that he gets dyspneic and lightheaded quicker now and recovers a bit longer compared to 6 months prior. But he is coping by checking his O2 sats, resting in between and slowing down/taking his time with activities.   Despite slow decline, patient is relatively stable and is content with his quality of life  Continue close follow up with pulmo.   2. Counseling regarding advanced care planning and goals of care  Assessment & Plan:  Continues to do well despite decline as mentioned above  We will continue to follow peripherally  ACP completed on last visit -     Patient renewed his living will/POA. He names his daughter as his POA. We requested a copy to scan in his chart.   Patient continues to display good understanding, insight, and judgement into his medical condition. His daughter does as well and is very supportive of her father. He understands and volunteers his understanding that he will only worsen over time, this is only a matter of when.   Since their last visit, they have already looked into nursing home and is strongly considering [facility] as a last option.  But he really wants to stay at home is much as possible and would hire extra help at home first instead of moving to a nursing home.  But they are not closed off to a nursing home if absolutely needed  3. Dyspnea  on minimal exertion  Assessment & Plan:  Continues to get very winded on minimal exertion.  Reports that it takes longer for him to recover from his breathlessness with lightheadedness   Of note, he has completed cardiac rehab but did not help  Discussed use of opioid and BZD again for MCGILL as well as their side effects. At this time, he and his daughter both do not believe that he needs to be on these medications yet.     Follow up  RTO in 6 months, call sooner if needed    Requested Prescriptions      No prescriptions requested or ordered in this encounter     There are no discontinued medications.    Representatives have queried the patient's controlled substance dispensing history in the Prescription Drug Monitoring Program in compliance with regulations before I have prescribed any controlled substances.  The prescription history is consistent with prescribed therapy and our practice policies.      30 minutes were spent face to face with Pilo Manriquez and his daughter with greater than 50% of the time spent in counseling or coordination of care including discussions of etiology of diagnosis, pathogenesis of diagnosis, prognosis of diagnosis, diagnostic results, impression, and recommendations, risks and benefits of treatment, instructions for disease self management, treatment instructions, follow up requirements, risk factors and risk reduction of disease, patient and family counseling/involvement in care and compliance with treatment regimen, advanced care planning.  All of the patient's questions were answered during this discussion.    No follow-ups on file.    Subjective:   Chief Complaint  Follow up visit for:  goal of care assessment and decisional support, disease process education and discussion of prognosis, advance care planning, emotional support in the setting of serious illness  HPI     Pilo Manriquez is a 79 y.o. male with ILD, pulmonary HTN, and chronic hypoxic respiratory failure who is on  baseline 4L O2 at home. Currently on Ofev and Tyvaso. He was referred to palliative care by pulmonology given what appears to be worsening symptoms.     He returns today with his daughter. He appears stable. He is wearing his oxygen in the office at 5L via NC. He reports steady and continued slow decline in overall health, especially noticed continued worsening of breathlessness/MCGILL and now takin him a bit longer to recover during rests in between activities. He learned and is being mindful to take his time and use his oxygen at all times, increasing the flow if he is expected to do more activities/talking. But overall despite above,  he reports still doing relatively well. I discussed use of low dose oxycodone and/or ativan to help with severe breathlessness which he denies currently. He does not think he is at that point yet.     Symptom/Support/Living will/Living situation as per previous discussion:    In terms of support, they have a relatively good system in place. His daughter lives 25 minutes away but checks in on him frequently throughout the day, everyday. He calls her too if he needs anything. His sister is also checking in on him. He also receives [employer] once a week who helps with light chores at home. They are planning on asking this company to check his home for safety and to suggest any house modifications, if needed to ensure his safety. I encouraged him to ask us or his PCP for any DMEs he may need at home. Right now, he has no other needs.     We explained the role of palliative in symptom management of his dyspnea. Advised that we rely on opioids and BZDs to manage this but that they come with unavoidable side effects. If it gets to the point where interventions from pulmo or cardio are no longer sufficient to provide him comfort, then opioids become necessary, outweighing risk and benefits. I do not recommend opioids at this time. And they both agreed.     We again spent time discussing his living  situation when he no longer can continue living independently. We discussed possible transfer to a nursing facility if appropriate. His daughter's place appears to still be an option as a last resort, but he expressed interest in a facility. I advised them to start looking for a place because there could be months-long wait. They appreciate this suggestion    We again discussed hospice as an option if the goal switches to pure comfort at home. Discussed that hospice can be provided at home as long as they have people in place to provide 24/7 cares at home in addition to hospice team who will not be present 24/7 at home.     His wife  last year after a short dawn with multiple myeloma. She was on hospice for a few days before passing away. He does have some understanding of hospice.    The following portions of the medical history were reviewed: past medical history, problem list, medication list, and social history.    Current Outpatient Medications:     ciclopirox (LOPROX) 0.77 % cream, PLEASE SEE ATTACHED FOR DETAILED DIRECTIONS, Disp: , Rfl:     finasteride (PROSCAR) 5 mg tablet, TAKE 1 TABLET BY MOUTH EVERY DAY, Disp: 90 tablet, Rfl: 1    FLUoxetine (PROzac) 20 mg capsule, TAKE 1 CAPSULE BY MOUTH EVERY DAY, Disp: 90 capsule, Rfl: 1    Multiple Vitamin (MULTI-DAY VITAMINS) TABS, Take 1 tablet by mouth daily, Disp: , Rfl:     Ofev 150 MG CAPS capsule, TAKE 1 CAPSULE BY MOUTH TWICE DAILY WITH FOOD., Disp: 60 capsule, Rfl: 11    sildenafil (REVATIO) 20 mg tablet, Take 0.5 tablets (10 mg total) by mouth 3 (three) times a day, Disp: 90 tablet, Rfl: 5    tamsulosin (FLOMAX) 0.4 mg, TAKE 2 CAPSULES BY MOUTH DAILY WITH DINNER.., Disp: 180 capsule, Rfl: 1    temazepam (RESTORIL) 30 mg capsule, Take 1 capsule (30 mg total) by mouth daily at bedtime, Disp: 30 capsule, Rfl: 5    Treprostinil 64 MCG POWD, Inhale 64 mcg 4 (four) times a day, Disp: 112 each, Rfl: 11    fluticasone (FLONASE) 50 mcg/act nasal spray, 2  sprays into each nostril daily (Patient not taking: Reported on 4/2/2024), Disp: 48 mL, Rfl: 3  Review of Systems   Constitutional:  Positive for activity change and fatigue. Negative for appetite change and unexpected weight change.   HENT:  Negative for trouble swallowing.    Respiratory:  Positive for shortness of breath.         Dyspnea on minimal exertion   Cardiovascular:  Negative for chest pain.   Gastrointestinal:  Negative for abdominal pain, constipation, diarrhea, nausea and vomiting.   Musculoskeletal:  Negative for back pain.   Neurological:  Positive for weakness.   Psychiatric/Behavioral:  Negative for sleep disturbance. The patient is not nervous/anxious.    All other systems reviewed and are negative.      All other systems negative    Objective:  Vital Signs  /60 (BP Location: Left arm, Patient Position: Sitting, Cuff Size: Standard)   Pulse 77   Temp 97.5 °F (36.4 °C) (Temporal)   Wt 78.9 kg (174 lb)   SpO2 92%   BMI 20.63 kg/m²    Physical Exam    Constitutional: Appears well-developed and well-nourished. Appears thin, chronically ill looking but not toxic appearing. Well kempt, pleasant, jovial. Appears overall stable. In no acute physical or emotional distress.   Head: Normocephalic and atraumatic. Temporal mm wasting  Eyes: EOM are normal. No ocular discharge. No scleral icterus.   Neck: No visible adenopathy or masses  Respiratory: Effort normal. No stridor. No respiratory distress. On 5L NC. Can complete long sentences without shortness of breath  Gastrointestinal: No abdominal distension.   Musculoskeletal: No edema.   Neurological: Alert, oriented and appropriately conversant.   Skin: No diaphoresis, no rashes seen on exposed areas of skin. Pale  Psychiatric: Displays a normal mood and affect. Behavior, judgement and thought content appear normal.     Alondra Khan MD  Palliative Medicine & Supportive Care  Internal Medicine  Available via [employer]  Office:  [phone]  Fax: [phone]

## 2024-09-05 NOTE — ASSESSMENT & PLAN NOTE
Continues to do well despite decline as mentioned above  We will continue to follow peripherally  ACP completed on last visit -     Patient renewed his living will/POA. He names his daughter as his POA. We requested a copy to scan in his chart.   Patient continues to display good understanding, insight, and judgement into his medical condition. His daughter does as well and is very supportive of her father. He understands and volunteers his understanding that he will only worsen over time, this is only a matter of when.   Since their last visit, they have already looked into nursing home and is strongly considering [facility] as a last option.  But he really wants to stay at home is much as possible and would hire extra help at home first instead of moving to a nursing home.  But they are not closed off to a nursing home if absolutely needed

## 2024-09-23 NOTE — PROGRESS NOTES
Outpatient Cardiology Note - Pilo Manriquez 79 y.o. male MRN: [id number]    @ Encounter: [id number]        Patient Active Problem List    Diagnosis Date Noted    Pulmonary hypertension (HCC) 10/18/2022    Chronic hypoxemic respiratory failure (HCC) 09/01/2023    Mild protein-calorie malnutrition (HCC) 09/01/2023    Dyspnea on minimal exertion 03/08/2023    Ambulatory dysfunction 03/08/2023    Other specified anemias 02/27/2023    Counseling regarding advanced care planning and goals of care 12/08/2022    Primary insomnia 10/31/2022    Abnormal weight loss 10/31/2022    ILD (interstitial lung disease) (HCC) 10/07/2022    ROSALINA (obstructive sleep apnea)     Personal history of COVID-19 07/07/2022    Dermatitis 02/18/2022    Shoulder pain 06/03/2020    Left anterior hemiblock 03/08/2020    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    Other hemorrhoids 03/05/2019    BPH with obstruction/lower urinary tract symptoms 06/13/2018    Combined arterial insufficiency and corporo-venous occlusive erectile dysfunction 06/13/2018       Assessment:  # Pulmonary arterial HTN  Impression: ILD- PH with predominance of PAH over ILD; precapillary PAH  PAH Rx: Tyvaso DPI 64 mcg, sildanefil 10 mg TID  Oxygen: 2 liters  Diuretic: not needed  Weight: 171 lbs  BNP 10/17/23: 82  2/26/23: 129  NT proBNP: 6/30/22: 187    Functional assessment:  6 min walk 8/18/22: 135 meters, pulse ox dropped to 87%  6 min walk 7/13/22: 308 meters, pulse ox dropped to 90%  6 min walk 2/24/22: 476 meters    Spirometry/ PFTs:  Spirometry 9/22/22: moderately to severely restrictive  PFTs 3/16/22: mild restrictive, DLCO 29%    Studies- personally reviewed by me  Echo 4/3/24:  LVEF: 55%  RV: mildly dilated, mildly reduced  Mild AI  PASP: 25 mmHg  Aorta 4.2 cm    Echo 4/5/23:  LVEF: 50%  RV: mildly dilated, normal function  PASP: normal  RVOT: no notching    RHC 10/18/22:  SaO2: 93%  HR: 60  RA: 1 mmHg  PA: 53/18/30 mmHg  PCWP: 3  PA sat 65%  [place]/CI: 4.7/2.4  L/min  PVR: 5.7 HOANG    Echo 9/17/22  LVEF: 50%  RV: mildly dilated- RVID 4 cm  PASP: 72 mmHg  RVOT: no notching  Aorta 4.2 cm  Dilated IVC    HRCT 3/11/22: biapical pleural parenchymal thickening. 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 He also has untreated ROSALINA and has been experiencing worsening MCGILL. His echo has shown indirect PA pressures at 72 mmHg with dilated IVC, CT chest showing pulmonary fibrosis with enlargement of main PA of 3.8 cm. Had COVID in May, hit him hard. Lost his wife this year to multiple myeloma. He had a tough year.      No hx of spinal stenosis or carpal tunnel    Interim:  Echo 4/3/24:  LVEF: 55%  RV: mildly dilated, mildly reduced  Mild AI  PASP: 25 mmHg  Aorta 4.2 cm    Past Medical History:   Diagnosis Date    Bifascicular block 03/08/2020    Asx: discovered on Ekg done 3/20 in eval of chest pain.     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     Counseling regarding advanced care planning and goals of care 12/08/2022    Erectile dysfunction        Review of Systems   Constitutional:  Negative for activity change, appetite change, fatigue and unexpected weight change.   HENT:  Negative for congestion and nosebleeds.    Eyes: Negative.    Respiratory:  Positive for shortness of breath. Negative for cough and chest tightness.    Cardiovascular:  Negative for chest pain, palpitations and leg swelling.   Gastrointestinal:  Negative for abdominal distention.   Endocrine: Negative.    Genitourinary: Negative.    Musculoskeletal:  Negative.    Skin: Negative.    Neurological:  Negative for dizziness, syncope and weakness.   Hematological: Negative.    Psychiatric/Behavioral: Negative.         No Known Allergies  .    Current Outpatient Medications:     ciclopirox (LOPROX) 0.77 % cream, PLEASE SEE ATTACHED FOR DETAILED DIRECTIONS, Disp: , Rfl:     finasteride (PROSCAR) 5 mg tablet, TAKE 1 TABLET BY MOUTH EVERY DAY, Disp: 90 tablet, Rfl: 1    FLUoxetine (PROzac) 20 mg capsule, TAKE 1 CAPSULE BY MOUTH EVERY DAY, Disp: 90 capsule, Rfl: 1    fluticasone (FLONASE) 50 mcg/act nasal spray, 2 sprays into each nostril daily (Patient not taking: Reported on 2024), Disp: 48 mL, Rfl: 3    Multiple Vitamin (MULTI-DAY VITAMINS) TABS, Take 1 tablet by mouth daily, Disp: , Rfl:     Ofev 150 MG CAPS capsule, TAKE 1 CAPSULE BY MOUTH TWICE DAILY WITH FOOD., Disp: 60 capsule, Rfl: 11    sildenafil (REVATIO) 20 mg tablet, Take 0.5 tablets (10 mg total) by mouth 3 (three) times a day, Disp: 90 tablet, Rfl: 5    tamsulosin (FLOMAX) 0.4 mg, TAKE 2 CAPSULES BY MOUTH DAILY WITH DINNER.., Disp: 180 capsule, Rfl: 1    temazepam (RESTORIL) 30 mg capsule, Take 1 capsule (30 mg total) by mouth daily at bedtime, Disp: 30 capsule, Rfl: 5    Treprostinil 64 MCG POWD, Inhale 64 mcg 4 (four) times a day, Disp: 112 each, Rfl: 11    Social History     Socioeconomic History    Marital status: /Civil Union     Spouse name: Not on file    Number of children: Not on file    Years of education: Not on file    Highest education level: Not on file   Occupational History    Not on file   Tobacco Use    Smoking status: Former     Current packs/day: 0.00     Average packs/day: 0.5 packs/day for 5.0 years (2.5 ttl pk-yrs)     Types: Cigarettes     Start date:      Quit date:      Years since quittin.7    Smokeless tobacco: Never   Vaping Use    Vaping status: Never Used   Substance and Sexual Activity    Alcohol use: Not Currently     Alcohol/week: 12.0 standard  drinks of alcohol     Types: 12 Cans of beer per week     Comment: socially    Drug use: No    Sexual activity: Not Currently   Other Topics Concern    Not on file   Social History Narrative    Not on file     Social Determinants of Health     Financial Resource Strain: Low Risk  (9/1/2023)    Overall Financial Resource Strain (CARDIA)     Difficulty of Paying Living Expenses: Not very hard   Food Insecurity: Not on file   Transportation Needs: No Transportation Needs (9/1/2023)    PRAPARE - Transportation     Lack of Transportation (Medical): No     Lack of Transportation (Non-Medical): No   Physical Activity: Not on file   Stress: Not on file   Social Connections: Not on file   Intimate Partner Violence: Not on file   Housing Stability: Not on file       Family History   Family history unknown: Yes       Physical Exam:    Vitals: There were no vitals taken for this visit., There is no height or weight on file to calculate BMI.,   Wt Readings from Last 3 Encounters:   09/05/24 78.9 kg (174 lb)   07/10/24 78.9 kg (174 lb)   06/20/24 79.9 kg (176 lb 3.2 oz)       Physical Exam:  There were no vitals filed for this visit.    Physical Exam  Constitutional:       Appearance: He is well-developed.   HENT:      Head: Normocephalic and atraumatic.   Eyes:      Pupils: Pupils are equal, round, and reactive to light.   Neck:      Vascular: No JVD.   Cardiovascular:      Rate and Rhythm: Normal rate and regular rhythm.      Heart sounds: No murmur heard.  Pulmonary:      Effort: Pulmonary effort is normal. No respiratory distress.      Breath sounds: Normal breath sounds.   Abdominal:      General: There is no distension.      Palpations: Abdomen is soft.      Tenderness: There is no abdominal tenderness.   Musculoskeletal:         General: Normal range of motion.      Cervical back: Normal range of motion.   Skin:     General: Skin is warm and dry.      Findings: No rash.   Neurological:      Mental Status: He is alert and  oriented to person, place, and time.       Labs & Results:    Lab Results   Component Value Date    WBC 4.99 06/28/2024    HGB 12.0 06/28/2024    HCT 38.7 06/28/2024     (H) 06/28/2024     06/28/2024     Lab Results   Component Value Date    SODIUM 140 06/28/2024    K 3.7 06/28/2024     06/28/2024    CO2 31 06/28/2024    BUN 17 06/28/2024    CREATININE 0.62 06/28/2024    GLUC 104 10/18/2022    CALCIUM 9.1 06/28/2024     Lab Results   Component Value Date    BNP 82 10/17/2023      Lab Results   Component Value Date    CHOLESTEROL 136 12/03/2021     Lab Results   Component Value Date    HDL 68 12/03/2021     Lab Results   Component Value Date    TRIG 43 12/03/2021     Lab Results   Component Value Date    NONHDLC 68 12/03/2021     EKG personally reviewed by Laureano Canseco.     Counseling / Coordination of Care  Time spent today 25 minutes.  Greater than 50% of total time was spent with the patient and / or family counseling and / or coordination of care. We discussed diagnoses, most recent studies and any changes in treatment  Thank you for the opportunity to participate in the care of this patient.    LAUREANO CANSECO D.O.  DIRECTOR OF HEART FAILURE/ PULMONARY HYPERTENSION  MEDICAL DIRECTOR OF LVAD PROGRAM  [facility]

## 2024-09-24 ENCOUNTER — OFFICE VISIT (OUTPATIENT)
Dept: CARDIOLOGY CLINIC | Facility: CLINIC | Age: 79
End: 2024-09-24
Payer: MEDICARE

## 2024-09-24 VITALS — OXYGEN SATURATION: 97 % | HEART RATE: 88 BPM | WEIGHT: 171 LBS | HEIGHT: 77 IN | BODY MASS INDEX: 20.19 KG/M2

## 2024-09-24 DIAGNOSIS — J96.11 CHRONIC HYPOXEMIC RESPIRATORY FAILURE (HCC): ICD-10-CM

## 2024-09-24 DIAGNOSIS — I27.20 PULMONARY HYPERTENSION (HCC): ICD-10-CM

## 2024-09-24 DIAGNOSIS — J84.9 ILD (INTERSTITIAL LUNG DISEASE) (HCC): Primary | Chronic | ICD-10-CM

## 2024-09-24 DIAGNOSIS — G47.33 OSA (OBSTRUCTIVE SLEEP APNEA): ICD-10-CM

## 2024-09-24 PROCEDURE — 99214 OFFICE O/P EST MOD 30 MIN: CPT | Performed by: INTERNAL MEDICINE

## 2024-10-09 ENCOUNTER — OFFICE VISIT (OUTPATIENT)
Dept: PULMONOLOGY | Facility: CLINIC | Age: 79
End: 2024-10-09
Payer: MEDICARE

## 2024-10-09 VITALS
WEIGHT: 167.2 LBS | HEIGHT: 77 IN | BODY MASS INDEX: 19.74 KG/M2 | HEART RATE: 63 BPM | SYSTOLIC BLOOD PRESSURE: 108 MMHG | DIASTOLIC BLOOD PRESSURE: 60 MMHG | OXYGEN SATURATION: 95 % | TEMPERATURE: 97.5 F

## 2024-10-09 DIAGNOSIS — J84.9 ILD (INTERSTITIAL LUNG DISEASE) (HCC): Primary | Chronic | ICD-10-CM

## 2024-10-09 DIAGNOSIS — J96.11 CHRONIC HYPOXEMIC RESPIRATORY FAILURE (HCC): ICD-10-CM

## 2024-10-09 DIAGNOSIS — I27.20 PULMONARY HYPERTENSION (HCC): ICD-10-CM

## 2024-10-09 DIAGNOSIS — E44.1 MILD PROTEIN-CALORIE MALNUTRITION (HCC): ICD-10-CM

## 2024-10-09 LAB

## 2024-10-09 PROCEDURE — 94618 PULMONARY STRESS TESTING: CPT

## 2024-10-09 PROCEDURE — 99214 OFFICE O/P EST MOD 30 MIN: CPT | Performed by: NURSE PRACTITIONER

## 2024-10-09 NOTE — ASSESSMENT & PLAN NOTE
Due to ILD. I have reviewed Cardiology note.  Continue O2 as directed  Continue Revatio Tyvaso as directed

## 2024-10-09 NOTE — ASSESSMENT & PLAN NOTE
Malnutrition Findings:          Continue supplement as doing     BMI Findings:        Wt Readings from Last 3 Encounters:   10/09/24 75.8 kg (167 lb 3.2 oz)   09/24/24 77.6 kg (171 lb)   09/05/24 78.9 kg (174 lb)     Body mass index is 19.83 kg/m².

## 2024-10-09 NOTE — ASSESSMENT & PLAN NOTE
"He notes that he has worsening shortness of breath in a slow steady decline over the past 6+ months. It is taking him longer to recover from exertion. He doesn't find he has any \"oompf\" anymore.    Repeat imaging: deferred. Patient does feel his condition is progressing however he doesn't see a clinical value in pursuing imaging that will not alter the treatment plan. I agree.    Continue Ofev twice per day; this medication is well tolerated.  "

## 2024-10-09 NOTE — ASSESSMENT & PLAN NOTE
Continue O2 5L/m 24 hours per day  Titrate to maintain >85%    We did do  6 minute walk test today and he had better control of hypoxemia with 6L/m flow. Current concentrator and portable only go to 5L/m. Will order higher level concentrator for at home.    He wishes to avoid heavy tanks /cumbersome; will continue with POC for now and he will take time to rest.

## 2024-10-09 NOTE — PROGRESS NOTES
"Pulmonary Follow-Up Note   Pilo Manriquez 79 y.o. male MRN: [id number]  10/9/2024      Assessment/Plan:    Problem List Items Addressed This Visit       ILD (interstitial lung disease) (HCC) - Primary (Chronic)     He notes that he has worsening shortness of breath in a slow steady decline over the past 6+ months. It is taking him longer to recover from exertion. He doesn't find he has any \"oompf\" anymore.    Repeat imaging: deferred. Patient does feel his condition is progressing however he doesn't see a clinical value in pursuing imaging that will not alter the treatment plan. I agree.    Continue Ofev twice per day; this medication is well tolerated.         Pulmonary hypertension (HCC)     Due to ILD. I have reviewed Cardiology note.  Continue O2 as directed  Continue Revatio, Tyvaso as directed         Chronic hypoxemic respiratory failure (HCC)     Continue O2 5L/m 24 hours per day  Titrate to maintain >85%    We did do  6 minute walk test today and he had better control of hypoxemia with 6L/m flow. Current concentrator and portable only go to 5L/m. Will order higher level concentrator for at home.    He wishes to avoid heavy tanks /cumbersome; will continue with POC for now and he will take time to rest.         Relevant Orders    POCT Oxygen Titration    Mild protein-calorie malnutrition (HCC)     Malnutrition Findings:          Continue supplement as doing     BMI Findings:        Wt Readings from Last 3 Encounters:   10/09/24 75.8 kg (167 lb 3.2 oz)   09/24/24 77.6 kg (171 lb)   09/05/24 78.9 kg (174 lb)     Body mass index is 19.83 kg/m².             Vaccines: recommend flu shot - early November per patient    Return in about 3 months (around 1/9/2025).    All of Pilo' questions were answered prior to leaving the office today.  He is aware to call our office with any further questions or concerns.    History of Present Illness   Reason for Visit: Follow up  Chief Complaint: ILD  HPI: Pilo Manriquez is a 79 " y.o. male who presents to the office today reporting progressive shortness of breath and hypoxia. He notes a 50ft walk from his bedroom to the front door his O2 saturation decreases to the 70's and rebounds in a few minutes. He does not have the energy level he used to have.     Feels his appetite diminishing however he is supplementing with a protein-based milk shake each day in addition to 3 meals per day.    Review of Systems   Constitutional:  Positive for appetite change.   All other systems reviewed and are negative.    Answers submitted by the patient for this visit:  Pulmonology Questionnaire (Submitted on 10/8/2024)  Chief Complaint: Primary symptoms  Do you have difficulty breathing?: Yes  When did you first notice your symptoms?: more than 1 year ago  How often do your symptoms occur?: constantly  Since you first noticed this problem, how has it changed?: gradually worsening  Do you have shortness of breath that occurs with effort or exertion?: Yes  Do you have fatigue?: Yes  Which of the following makes your symptoms worse?: any activity  Which of the following makes your symptoms better?: rest      Historical Information   Past Medical History:   Diagnosis Date    Bifascicular block 03/08/2020    Asx: discovered on Ekg done 3/20 in eval of chest pain.     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     Counseling regarding advanced care planning and goals of care 12/08/2022    Erectile dysfunction      Past Surgical History:   Procedure Laterality Date    CARDIAC CATHETERIZATION N/A 10/18/2022    Procedure: Cardiac RHC;  Surgeon: Laureano Holt DO;  [facility];  Service: Cardiology    COLONOSCOPY      SKIN BIOPSY       Family History   Family history unknown: Yes     Social History   Social History     Substance and Sexual Activity   Alcohol Use Not Currently    Alcohol/week: 12.0  "standard drinks of alcohol    Types: 12 Cans of beer per week    Comment: socially     Social History     Substance and Sexual Activity   Drug Use No     Social History     Tobacco Use   Smoking Status Former    Current packs/day: 0.00    Average packs/day: 0.5 packs/day for 5.0 years (2.5 ttl pk-yrs)    Types: Cigarettes    Start date:     Quit date:     Years since quittin.8   Smokeless Tobacco Never     E-Cigarette/Vaping    E-Cigarette Use Never User      E-Cigarette/Vaping Substances    Nicotine No     THC No     CBD No     Flavoring No     Other No     Unknown No        Meds/Allergies     Current Outpatient Medications:     ciclopirox (LOPROX) 0.77 % cream, PLEASE SEE ATTACHED FOR DETAILED DIRECTIONS, Disp: , Rfl:     finasteride (PROSCAR) 5 mg tablet, TAKE 1 TABLET BY MOUTH EVERY DAY, Disp: 90 tablet, Rfl: 1    FLUoxetine (PROzac) 20 mg capsule, TAKE 1 CAPSULE BY MOUTH EVERY DAY, Disp: 90 capsule, Rfl: 1    Multiple Vitamin (MULTI-DAY VITAMINS) TABS, Take 1 tablet by mouth daily, Disp: , Rfl:     Ofev 150 MG CAPS capsule, TAKE 1 CAPSULE BY MOUTH TWICE DAILY WITH FOOD., Disp: 60 capsule, Rfl: 11    sildenafil (REVATIO) 20 mg tablet, Take 0.5 tablets (10 mg total) by mouth 3 (three) times a day, Disp: 90 tablet, Rfl: 5    tamsulosin (FLOMAX) 0.4 mg, TAKE 2 CAPSULES BY MOUTH DAILY WITH DINNER.., Disp: 180 capsule, Rfl: 1    temazepam (RESTORIL) 30 mg capsule, Take 1 capsule (30 mg total) by mouth daily at bedtime, Disp: 30 capsule, Rfl: 5    Treprostinil 64 MCG POWD, Inhale 64 mcg 4 (four) times a day, Disp: 112 each, Rfl: 11    fluticasone (FLONASE) 50 mcg/act nasal spray, 2 sprays into each nostril daily (Patient not taking: Reported on 2024), Disp: 48 mL, Rfl: 3  No Known Allergies    Vitals: Blood pressure 108/60, pulse 63, temperature 97.5 °F (36.4 °C), temperature source Tympanic, height 6' 5\" (1.956 m), weight 75.8 kg (167 lb 3.2 oz), SpO2 95%. Body mass index is 19.83 kg/m². Oxygen " "Therapy  SpO2: 95 %  Oxygen Therapy: Supplemental oxygen  O2 Delivery Method: Nasal cannula  O2 Flow Rate (L/min): 4 L/min      Physical Exam  Vitals reviewed.   Constitutional:       Appearance: Normal appearance.   HENT:      Head: Normocephalic.      Nose: Nose normal.      Mouth/Throat:      Mouth: Mucous membranes are moist.      Pharynx: Oropharynx is clear.   Cardiovascular:      Rate and Rhythm: Normal rate and regular rhythm.      Pulses: Normal pulses.      Heart sounds: Normal heart sounds.   Pulmonary:      Effort: Pulmonary effort is normal.      Breath sounds: Normal breath sounds.   Musculoskeletal:         General: Normal range of motion.   Skin:     General: Skin is warm.      Capillary Refill: Capillary refill takes less than 2 seconds.   Neurological:      General: No focal deficit present.      Mental Status: He is alert and oriented to person, place, and time.   Psychiatric:         Mood and Affect: Mood normal.         Behavior: Behavior normal.         Labs:   Lab Results   Component Value Date    WBC 4.99 06/28/2024    HGB 12.0 06/28/2024    HCT 38.7 06/28/2024     (H) 06/28/2024     06/28/2024    EOSPCT 4 06/28/2024    EOSABS 0.22 06/28/2024    MONOPCT 9 06/28/2024     Lab Results   Component Value Date    CALCIUM 9.1 06/28/2024    K 3.7 06/28/2024    CO2 31 06/28/2024     06/28/2024    BUN 17 06/28/2024    CREATININE 0.62 06/28/2024     No results found for: \"IGE\", \"RAST\"  Lab Results   Component Value Date    ALT 9 06/28/2024    AST 17 06/28/2024    ALKPHOS 55 06/28/2024       6 MINUTE WALK  Resting, 4L/m supplemental O2: 98%    With ambulation he did decrease to 84% on 4L/m and 5L/m.  With 6L/m he was able to maintain above 85% and felt he had extra energy.  He walked a total of 162m.    ONIEL Kamara  [facility]        Portions of the record may have been created with voice recognition software.  Occasional wrong word or \"sound " "a like\" substitutions may have occurred due to the inherent limitations of voice recognition software.  Read the chart carefully and recognize, using context, where substitutions have occurred or contact the dictating provider.  "

## 2024-10-16 ENCOUNTER — HOSPITAL ENCOUNTER (OUTPATIENT)
Dept: NON INVASIVE DIAGNOSTICS | Facility: HOSPITAL | Age: 79
Discharge: HOME | End: 2024-10-16
Attending: INTERNAL MEDICINE
Payer: MEDICARE

## 2024-10-16 VITALS
SYSTOLIC BLOOD PRESSURE: 108 MMHG | HEART RATE: 63 BPM | BODY MASS INDEX: 19.72 KG/M2 | HEIGHT: 77 IN | DIASTOLIC BLOOD PRESSURE: 60 MMHG | WEIGHT: 167 LBS

## 2024-10-16 DIAGNOSIS — J84.9 ILD (INTERSTITIAL LUNG DISEASE) (HCC): Chronic | ICD-10-CM

## 2024-10-16 DIAGNOSIS — I27.20 PULMONARY HYPERTENSION (HCC): ICD-10-CM

## 2024-10-16 PROCEDURE — 93306 TTE W/DOPPLER COMPLETE: CPT | Performed by: INTERNAL MEDICINE

## 2024-10-16 PROCEDURE — 93306 TTE W/DOPPLER COMPLETE: CPT

## 2024-10-17 LAB
AORTIC ROOT: 3.8 CM
APICAL FOUR CHAMBER EJECTION FRACTION: 56 %
ASCENDING AORTA: 3.6 CM
BSA FOR ECHO PROCEDURE: 2.07 M2
DOP CALC LVOT AREA: 3.46 CM2
DOP CALC LVOT DIAMETER: 2.1 CM
E WAVE DECELERATION TIME: 326 MS
E/A RATIO: 0.32
FRACTIONAL SHORTENING: 30 (ref 28–44)
INTERVENTRICULAR SEPTUM IN DIASTOLE (PARASTERNAL SHORT AXIS VIEW): 0.9 CM
INTERVENTRICULAR SEPTUM: 0.9 CM (ref 0.6–1.1)
LAAS-AP2: 19.5 CM2
LAAS-AP4: 16.5 CM2
LEFT ATRIUM SIZE: 3.7 CM
LEFT ATRIUM VOLUME (MOD BIPLANE): 46 ML
LEFT ATRIUM VOLUME INDEX (MOD BIPLANE): 22.2 ML/M2
LEFT INTERNAL DIMENSION IN SYSTOLE: 3.5 CM (ref 2.1–4)
LEFT VENTRICULAR INTERNAL DIMENSION IN DIASTOLE: 5 CM (ref 3.5–6)
LEFT VENTRICULAR POSTERIOR WALL IN END DIASTOLE: 0.9 CM
LEFT VENTRICULAR STROKE VOLUME: 69 ML
LVSV (TEICH): 69 ML
MV E'TISSUE VEL-LAT: 8 CM/S
MV E'TISSUE VEL-SEP: 4 CM/S
MV PEAK A VEL: 0.85 M/S
MV PEAK E VEL: 27 CM/S
MV STENOSIS PRESSURE HALF TIME: 95 MS
MV VALVE AREA P 1/2 METHOD: 2.32
RIGHT ATRIUM AREA SYSTOLE A4C: 24.7 CM2
RIGHT VENTRICLE ID DIMENSION: 4.5 CM
SINOTUBULAR JUNCTION: 3.2 CM
SL CV LEFT ATRIUM LENGTH A2C: 5.6 CM
SL CV PED ECHO LEFT VENTRICLE DIASTOLIC VOLUME (MOD BIPLANE) 2D: 120 ML
SL CV PED ECHO LEFT VENTRICLE SYSTOLIC VOLUME (MOD BIPLANE) 2D: 51 ML
SL CV SINUS OF VALSALVA 2D: 3.9 CM
STJ: 3.2 CM
TR MAX PG: 18 MMHG
TR PEAK VELOCITY: 2.2 M/S
TRICUSPID ANNULAR PLANE SYSTOLIC EXCURSION: 1.9 CM
TRICUSPID VALVE PEAK REGURGITATION VELOCITY: 2.15 M/S

## 2024-10-18 ENCOUNTER — TELEPHONE (OUTPATIENT)
Dept: FAMILY MEDICINE CLINIC | Facility: CLINIC | Age: 79
End: 2024-10-18

## 2024-10-18 ENCOUNTER — TELEPHONE (OUTPATIENT)
Age: 79
End: 2024-10-18

## 2024-10-18 NOTE — TELEPHONE ENCOUNTER
"Pt calls today stating he no longer qualifies for the medication assistance program he was in for Tyvaso.  He states he can not otherwise afford the medication.  He was advised by his insurance that he needs a \"formulary exemption\" form filled out in order to get assistance again.  Please review and advise, thanks you.  "

## 2024-10-18 NOTE — TELEPHONE ENCOUNTER
Patient called the RX Refill Line. Message is being forwarded to the office.     Patient is requesting a refill of megestrol (MEGACE ES) 625 MG/5ML suspension. Would like a script for that so he can come pick it up.  Not on active med list to que up.     Please contact patient at [phone]

## 2024-10-21 DIAGNOSIS — R63.4 WEIGHT LOSS: Primary | ICD-10-CM

## 2024-10-21 RX ORDER — MEGESTROL ACETATE 125 MG/ML
625 SUSPENSION ORAL DAILY
Qty: 150 ML | Refills: 3 | Status: SHIPPED | OUTPATIENT
Start: 2024-10-21

## 2024-10-22 ENCOUNTER — TELEPHONE (OUTPATIENT)
Age: 79
End: 2024-10-22

## 2024-10-22 NOTE — TELEPHONE ENCOUNTER
PA for megestrol 625 MG/5M SUBMITTED     via    [x]CMM-KEY: [id number]  []Surescripts-Case ID #   []Availity-Auth ID # NDC #   []Faxed to plan   []Other website   []Phone call Case ID #     Office notes sent, clinical questions answered. Awaiting determination    Turnaround time for your insurance to make a decision on your Prior Authorization can take 7-21 business days.

## 2024-10-23 NOTE — TELEPHONE ENCOUNTER
PA for megestrol 625 MG/5M DENIED    Reason:(Screenshot if applicable)        Message sent to office clinical pool Yes    Denial letter scanned into Media Yes    Appeal started No (Provider will need to decide if appeal is warranted and send clinical documentation to Prior Authorization Team for initiation.)    **Please follow up with your patient regarding denial and next steps**

## 2024-10-24 ENCOUNTER — TELEPHONE (OUTPATIENT)
Dept: CARDIOLOGY CLINIC | Facility: CLINIC | Age: 79
End: 2024-10-24

## 2024-10-25 NOTE — TELEPHONE ENCOUNTER
We can start mirtazepine for weight loss. We would have to discontinue the prozac in order to start the Mirtazepine. Let me know.

## 2024-10-25 NOTE — TELEPHONE ENCOUNTER
Patient tried obtaining this medication and was told that it wasn't covered by insurance. Pt would like to know if an appeal is possible or if another medication can be prescribed. Please advise

## 2024-10-28 ENCOUNTER — TELEPHONE (OUTPATIENT)
Dept: CARDIOLOGY CLINIC | Facility: CLINIC | Age: 79
End: 2024-10-28

## 2024-10-28 ENCOUNTER — TELEPHONE (OUTPATIENT)
Age: 79
End: 2024-10-28

## 2024-10-28 DIAGNOSIS — J84.9 ILD (INTERSTITIAL LUNG DISEASE) (HCC): Chronic | ICD-10-CM

## 2024-10-28 NOTE — TELEPHONE ENCOUNTER
PA for treprostinil  CANCELLED due to     [x]Approval on file-dates approved   []Medication already on Formulary  []Brand Name Preferred  []Patient no longer covered by insurance    Patient advised by     [x]My Chart Message  [x]Phone call  Not able to reach patient         Scanned into Media  Justin RAJPUT (Ch: [id number])  PA Case ID #: [id number]  Need Help? Call us at [phone]  Outcome  N/A today by [employer]  Dismissed-Approval already granted  Drug  Tyvaso DPI Maintenance Kit 64MCG powder

## 2024-10-28 NOTE — TELEPHONE ENCOUNTER
Kailey/[employer], states an appeal for megestrol (MEGACE ES) 625 MG/5ML suspension [[id number]  . Questions the ICB 10 code, and the underlining cause for the Patients  CACHXIA weight Loss. Upon chart review/medications, conveyed the [employer] code# R63.4, unable to Locate the CACHXIA information. Please advise Kailey at [phone], if any further questions.

## 2024-10-28 NOTE — TELEPHONE ENCOUNTER
Pt called in stating the following med refill   megestrol (MEGACE ES) 625 MG/5ML suspension costs him $220 which is way too expensive for him. So therefore he is requesting Dorcas to prescribe him with an alternate medication. Please do advise him on the same by an return call. Thanks

## 2024-10-28 NOTE — TELEPHONE ENCOUNTER
Morning,I would need a updated script with the current dose-& send to the spec pharmacy . For a PA to be done     Thank you

## 2024-10-28 NOTE — TELEPHONE ENCOUNTER
PA for treprostinil 64SUBMITTED     via    [x][employer]-KEYKey: [id number]  []Surescripts-Case ID #   []Availity-Auth ID # NDC #   []Faxed to plan   []Other website   []Phone call Case ID #     Office notes sent, clinical questions answered. Awaiting determination    Turnaround time for your insurance to make a decision on your Prior Authorization can take 7-21 business days.

## 2024-10-29 ENCOUNTER — TELEPHONE (OUTPATIENT)
Dept: FAMILY MEDICINE CLINIC | Facility: CLINIC | Age: 79
End: 2024-10-29

## 2024-10-29 DIAGNOSIS — R63.4 ABNORMAL WEIGHT LOSS: Primary | Chronic | ICD-10-CM

## 2024-10-29 RX ORDER — MIRTAZAPINE 7.5 MG/1
7.5 TABLET, FILM COATED ORAL
Qty: 30 TABLET | Refills: 5 | Status: SHIPPED | OUTPATIENT
Start: 2024-10-29

## 2024-10-30 DIAGNOSIS — I27.21 PAH (PULMONARY ARTERY HYPERTENSION) (HCC): ICD-10-CM

## 2024-10-30 RX ORDER — SILDENAFIL CITRATE 20 MG/1
10 TABLET ORAL 3 TIMES DAILY
Qty: 135 TABLET | Refills: 3 | Status: SHIPPED | OUTPATIENT
Start: 2024-10-30 | End: 2024-11-05

## 2024-10-30 NOTE — TELEPHONE ENCOUNTER
Patient calling for follow up on medication request. States he did not receive a call back concerning medication request. I advised a [employer] message was sent to him with new med and instructions. I reviewed PCP's directions and advised he call his pharmacy to ensure medication is ready for . He thank moises for our help!

## 2024-11-04 ENCOUNTER — TELEPHONE (OUTPATIENT)
Dept: CARDIOLOGY CLINIC | Facility: CLINIC | Age: 79
End: 2024-11-04

## 2024-11-04 DIAGNOSIS — I27.21 PAH (PULMONARY ARTERY HYPERTENSION) (HCC): ICD-10-CM

## 2024-11-04 NOTE — TELEPHONE ENCOUNTER
Spoke with pt. He stated he does not need PA until end of December.  He received a letter in the mail and it confused about exemption form.  We will do PA for him 2025.

## 2024-11-05 ENCOUNTER — RA CDI HCC (OUTPATIENT)
Dept: OTHER | Facility: HOSPITAL | Age: 79
End: 2024-11-05

## 2024-11-05 RX ORDER — SILDENAFIL CITRATE 20 MG/1
TABLET ORAL
Qty: 90 TABLET | Refills: 3 | Status: SHIPPED | OUTPATIENT
Start: 2024-11-05

## 2024-11-05 NOTE — PROGRESS NOTES
HCC coding opportunities       Chart reviewed, no opportunity found: CHART REVIEWED, NO OPPORTUNITY FOUND        Patients Insurance     Medicare Insurance: Medicare           No

## 2024-11-12 ENCOUNTER — CONSULT (OUTPATIENT)
Dept: FAMILY MEDICINE CLINIC | Facility: CLINIC | Age: 79
End: 2024-11-12
Payer: MEDICARE

## 2024-11-12 VITALS
TEMPERATURE: 97.1 F | OXYGEN SATURATION: 66 % | HEART RATE: 66 BPM | WEIGHT: 164.4 LBS | DIASTOLIC BLOOD PRESSURE: 60 MMHG | BODY MASS INDEX: 19.41 KG/M2 | SYSTOLIC BLOOD PRESSURE: 112 MMHG | HEIGHT: 77 IN

## 2024-11-12 DIAGNOSIS — Z01.818 PREOP EXAMINATION: Primary | ICD-10-CM

## 2024-11-12 DIAGNOSIS — H25.9 SENILE CATARACT OF RIGHT EYE, UNSPECIFIED AGE-RELATED CATARACT TYPE: ICD-10-CM

## 2024-11-12 DIAGNOSIS — R23.8 SKIN IRRITATION: ICD-10-CM

## 2024-11-12 PROBLEM — M25.519 SHOULDER PAIN: Status: RESOLVED | Noted: 2020-06-03 | Resolved: 2024-11-12

## 2024-11-12 PROBLEM — Z71.89 COUNSELING REGARDING ADVANCED CARE PLANNING AND GOALS OF CARE: Status: RESOLVED | Noted: 2022-12-08 | Resolved: 2024-11-12

## 2024-11-12 PROCEDURE — 99214 OFFICE O/P EST MOD 30 MIN: CPT

## 2024-11-12 RX ORDER — ZINC OXIDE
OINTMENT (GRAM) TOPICAL AS NEEDED
Qty: 56 G | Refills: 0 | Status: SHIPPED | OUTPATIENT
Start: 2024-11-12

## 2024-11-12 NOTE — PROGRESS NOTES
[facility] PRE-OPERATIVE EVALUATION  [facility] - [facility]    NAME: Pilo Manriquez  AGE: 79 y.o. SEX: male  : 1945     DATE: 2024    [facility] Pre-Operative Evaluation      Chief Complaint: Pre-operative Evaluation     Surgery: R eye cataract   Anticipated Date of Surgery: 24  Referring Provider: Oneil Diego MD     History of Present Illness:     Pilo Manriquez is a 79 y.o. male who presents to the office today for a preoperative consultation at the request of surgeon, [facility] [place] (Oneil Diego MD), who plans on performing R eye cataract removal  on 24. Planned anesthesia is  MAC/Topical . Patient has a bleeding risk of: no recent abnormal bleeding. Patient does not have objections to receiving blood products if needed. Current anti-platelet/anti-coagulation medications that the patient is prescribed includes:  none .      Assessment of Chronic Conditions:   - COPD: Chronic hypoxic respiratory failure, ILD, ROSALINA   - Hypertension: Pulmonary HTN      Assessment of Cardiac Risk:  Denies unstable or severe angina or MI in the last 6 weeks or history of stent placement in the last year   Denies decompensated heart failure (e.g. New onset heart failure, NYHA functional class IV heart failure, or worsening existing heart failure)  Denies significant arrhythmias such as high grade AV block, symptomatic ventricular arrhythmia, newly recognized ventricular tachycardia, supraventricular tachycardia with resting heart rate >100, or symptomatic bradycardia  Denies severe heart valve disease including aortic stenosis or symptomatic mitral stenosis     Exercise Capacity:  Able to walk 4 blocks without symptoms?: No, chronic hypoxic respiratory failure   Able to walk 2 flights without symptoms?: No, chronic hypoxic respiratory failure     Prior Anesthesia Reactions: No     Personal history of venous thromboembolic disease? No    History of  steroid use for >2 weeks within last year? No         Review of Systems:     Review of Systems   Constitutional:  Negative for activity change, chills, fatigue and fever.   HENT:  Negative for congestion, ear pain, rhinorrhea and sore throat.    Eyes:  Negative for pain and visual disturbance.   Respiratory:  Negative for cough, chest tightness and shortness of breath.    Cardiovascular:  Negative for chest pain, palpitations and leg swelling.   Gastrointestinal:  Negative for abdominal pain, constipation, diarrhea, nausea and vomiting.   Genitourinary:  Negative for decreased urine volume, dysuria, frequency, hematuria and urgency.   Musculoskeletal:  Negative for arthralgias and back pain.   Skin:  Negative for color change and rash.   Neurological:  Negative for seizures, syncope and headaches.   Psychiatric/Behavioral:  Negative for dysphoric mood. The patient is not nervous/anxious.    All other systems reviewed and are negative.      Current Problem List:     Patient Active Problem List   Diagnosis    BPH with obstruction/lower urinary tract symptoms    Combined arterial insufficiency and corporo-venous occlusive erectile dysfunction    Other hemorrhoids    Herpes simplex infection of penis    Left anterior hemiblock    Shoulder pain    Dermatitis    ROSALINA (obstructive sleep apnea)    ILD (interstitial lung disease) (HCC)    Pulmonary hypertension (HCC)    Primary insomnia    Abnormal weight loss    Counseling regarding advanced care planning and goals of care    Other specified anemias    Dyspnea on minimal exertion    Ambulatory dysfunction    Chronic hypoxemic respiratory failure (HCC)    Mild protein-calorie malnutrition (HCC)    Personal history of COVID-19       Allergies:     No Known Allergies    Current Medications:       Current Outpatient Medications:     ciclopirox (LOPROX) 0.77 % cream, PLEASE SEE ATTACHED FOR DETAILED DIRECTIONS, Disp: , Rfl:     finasteride (PROSCAR) 5 mg tablet, TAKE 1 TABLET BY  MOUTH EVERY DAY, Disp: 90 tablet, Rfl: 1    mirtazapine (REMERON) 7.5 MG tablet, Take 1 tablet (7.5 mg total) by mouth daily at bedtime, Disp: 30 tablet, Rfl: 5    Multiple Vitamin (MULTI-DAY VITAMINS) TABS, Take 1 tablet by mouth daily, Disp: , Rfl:     Ofev 150 MG CAPS capsule, TAKE 1 CAPSULE BY MOUTH TWICE DAILY WITH FOOD., Disp: 60 capsule, Rfl: 11    sildenafil (REVATIO) 20 mg tablet, TAKE ONE HALF TABLET BY MOUTH THREE TIMES DAILY. GENERIC FOR REVATIO., Disp: 90 tablet, Rfl: 3    tamsulosin (FLOMAX) 0.4 mg, TAKE 2 CAPSULES BY MOUTH DAILY WITH DINNER.., Disp: 180 capsule, Rfl: 1    temazepam (RESTORIL) 30 mg capsule, Take 1 capsule (30 mg total) by mouth daily at bedtime, Disp: 30 capsule, Rfl: 5    Treprostinil 64 MCG POWD, Inhale 64 mcg 4 (four) times a day, Disp: 112 each, Rfl: 11    fluticasone (FLONASE) 50 mcg/act nasal spray, 2 sprays into each nostril daily (Patient not taking: Reported on 4/2/2024), Disp: 48 mL, Rfl: 3    Past Medical History:       Past Medical History:   Diagnosis Date    Bifascicular block 03/08/2020    Asx: discovered on Ekg done 3/20 in eval of chest pain.     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     Counseling regarding advanced care planning and goals of care 12/08/2022    Erectile dysfunction         Past Surgical History:   Procedure Laterality Date    CARDIAC CATHETERIZATION N/A 10/18/2022    Procedure: Cardiac RHC;  Surgeon: Laureano Holt DO;  Location: [facility];  Service: Cardiology    COLONOSCOPY      SKIN BIOPSY          Family History   Family history unknown: Yes        Social History     Socioeconomic History    Marital status: /Civil Union     Spouse name: Not on file    Number of children: Not on file    Years of education: Not on file    Highest education level: Not on file   Occupational History    Not on file   Tobacco Use    Smoking  "status: Former     Current packs/day: 0.00     Average packs/day: 0.5 packs/day for 5.0 years (2.5 ttl pk-yrs)     Types: Cigarettes     Start date:      Quit date:      Years since quittin.9     Passive exposure: Past    Smokeless tobacco: Never   Vaping Use    Vaping status: Never Used   Substance and Sexual Activity    Alcohol use: Not Currently     Alcohol/week: 12.0 standard drinks of alcohol     Types: 12 Cans of beer per week     Comment: socially    Drug use: No    Sexual activity: Not Currently   Other Topics Concern    Not on file   Social History Narrative    Not on file     Social Determinants of Health     Financial Resource Strain: Low Risk  (2023)    Overall Financial Resource Strain (CARDIA)     Difficulty of Paying Living Expenses: Not very hard   Food Insecurity: Not on file   Transportation Needs: No Transportation Needs (2023)    PRAPARE - Transportation     Lack of Transportation (Medical): No     Lack of Transportation (Non-Medical): No   Physical Activity: Not on file   Stress: Not on file   Social Connections: Not on file   Intimate Partner Violence: Not on file   Housing Stability: Not on file        Physical Exam:     /60 (BP Location: Left arm, Patient Position: Sitting, Cuff Size: Large)   Pulse 66   Temp (!) 97.1 °F (36.2 °C)   Ht 6' 5\" (1.956 m)   Wt 74.6 kg (164 lb 6.4 oz)   SpO2 (!) 66%   BMI 19.49 kg/m²     Physical Exam  Vitals reviewed.   Constitutional:       Appearance: Normal appearance. He is normal weight.   HENT:      Head: Normocephalic.      Right Ear: Tympanic membrane, ear canal and external ear normal.      Left Ear: Tympanic membrane, ear canal and external ear normal.      Nose: Nose normal.      Mouth/Throat:      Mouth: Mucous membranes are moist.      Pharynx: Oropharynx is clear.   Eyes:      Extraocular Movements: Extraocular movements intact.      Conjunctiva/sclera: Conjunctivae normal.      Pupils: Pupils are equal, round, and " reactive to light.   Cardiovascular:      Rate and Rhythm: Normal rate and regular rhythm.      Pulses: Normal pulses.      Heart sounds: Normal heart sounds.   Pulmonary:      Effort: Pulmonary effort is normal.      Breath sounds: Normal breath sounds.   Abdominal:      General: Bowel sounds are normal.      Palpations: Abdomen is soft.   Musculoskeletal:         General: Normal range of motion.      Cervical back: Normal range of motion.   Skin:     General: Skin is warm.      Capillary Refill: Capillary refill takes less than 2 seconds.   Neurological:      General: No focal deficit present.      Mental Status: He is alert and oriented to person, place, and time. Mental status is at baseline.   Psychiatric:         Mood and Affect: Mood normal.         Behavior: Behavior normal.         Thought Content: Thought content normal.         Judgment: Judgment normal.          Data:     Pre-operative work-up    Laboratory Results: none ordered      EKG: Results Review Statement: No pertinent imaging studies reviewed.    Chest x-ray: Results Review Statement: No pertinent imaging studies reviewed.      Previous cardiopulmonary studies within the past year:  Echocardiogram: 10/2024 EF around 52%.   Cardiac Catheterization: n/a  Stress Test: n/a  Pulmonary Function Testing: n/a      Assessment & Recommendations:     No diagnosis found.    Pre-Op Evaluation Assessment  79 y.o. male with planned surgery: R eye cataract removal . .    Known risk factors for perioperative complications: Chronic pulmonary disease.        Current medications which may produce withdrawal symptoms if withheld perioperatively: none.    Pre-Op Evaluation Plan  1. Further preoperative workup as follows:   - None; no further preoperative work-up is required    2. Medication Management/Recommendations:   - None, continue medication regimen including morning of surgery, with sip of water  - Patient has been instructed to avoid herbs or non-directed  vitamins the week prior to surgery to ensure no drug interactions with perioperative surgical and anesthetic medications.  - Patient has been instructed to avoid aspirin containing medications or non-steroidal anti-inflammatory drugs for the week preceding surgery.    3. Prophylaxis for cardiac events with perioperative beta-blockers: not indicated.    4. Patient requires further consultation with: None    Clearance  Patient is CLEARED for surgery without any additional cardiac testing.     ONIEL Chavira  [address] [address]  Phone#  [phone]  Fax#  [phone]

## 2024-11-13 ENCOUNTER — TELEPHONE (OUTPATIENT)
Age: 79
End: 2024-11-13

## 2024-11-13 NOTE — TELEPHONE ENCOUNTER
Patient called asking if liver oil-zinc oxide (Boudreauxs Butt Paste) 40 % ointment was called into his pharmacy.  Advised that yes, this was sent to [facility]pharmacy #[id number] - [place] - [address] yesterday morning.    Patient will contact pharmacy.

## 2024-11-22 DIAGNOSIS — R63.4 ABNORMAL WEIGHT LOSS: Chronic | ICD-10-CM

## 2024-11-22 RX ORDER — MIRTAZAPINE 7.5 MG/1
7.5 TABLET, FILM COATED ORAL
Qty: 90 TABLET | Refills: 1 | Status: SHIPPED | OUTPATIENT
Start: 2024-11-22

## 2024-11-24 DIAGNOSIS — G47.00 INSOMNIA, UNSPECIFIED TYPE: ICD-10-CM

## 2024-11-27 ENCOUNTER — TELEPHONE (OUTPATIENT)
Age: 79
End: 2024-11-27

## 2024-11-27 DIAGNOSIS — G47.00 INSOMNIA, UNSPECIFIED TYPE: ICD-10-CM

## 2024-11-27 RX ORDER — TEMAZEPAM 30 MG/1
30 CAPSULE ORAL
Qty: 30 CAPSULE | OUTPATIENT
Start: 2024-11-27

## 2024-11-27 RX ORDER — TEMAZEPAM 30 MG/1
30 CAPSULE ORAL
Qty: 60 CAPSULE | Refills: 0 | Status: SHIPPED | OUTPATIENT
Start: 2024-11-27

## 2024-12-10 DIAGNOSIS — J84.9 ILD (INTERSTITIAL LUNG DISEASE) (HCC): ICD-10-CM

## 2024-12-10 NOTE — TELEPHONE ENCOUNTER
Patient called to request a refill for their Ofev advised a refill was requested on 12/10 and is pending approval. Patient verbalized understanding and is in agreement.

## 2024-12-11 ENCOUNTER — TELEPHONE (OUTPATIENT)
Dept: CARDIOLOGY CLINIC | Facility: CLINIC | Age: 79
End: 2024-12-11

## 2024-12-11 DIAGNOSIS — J84.9 ILD (INTERSTITIAL LUNG DISEASE) (HCC): Chronic | ICD-10-CM

## 2024-12-11 RX ORDER — NINTEDANIB 150 MG/1
150 CAPSULE ORAL 2 TIMES DAILY WITH MEALS
Qty: 60 CAPSULE | Refills: 11 | Status: SHIPPED | OUTPATIENT
Start: 2024-12-11

## 2024-12-11 RX ORDER — TREPROSTINIL 16-32-48
32 KIT INHALATION 4 TIMES DAILY
Qty: 252 EACH | Refills: 2 | Status: SHIPPED | OUTPATIENT
Start: 2024-12-11

## 2024-12-11 NOTE — TELEPHONE ENCOUNTER
I need PA for Tyvaso. He is auth until 1/25/25. The pt is stating we can send in now for next year.  I'm not sure if that is correct.    If it can be sent in now, please wait until Dr. Holt signs new order today.      Thank you

## 2024-12-11 NOTE — TELEPHONE ENCOUNTER
PA for Tyvaso DPI Titration Kit 16 & 32 & 48MCG powder  SUBMITTED to [employer]     via    [x]CMM-KEY: [id number]  []Surescripts-Case ID #   []Availity-Auth ID # NDC #   []Faxed to plan   []Other website   []Phone call Case ID #     []PA sent as URGENT    All office notes, labs and other pertaining documents and studies sent. Clinical questions answered. Awaiting determination from insurance company.     Turnaround time for your insurance to make a decision on your Prior Authorization can take 7-21 business days.

## 2024-12-11 NOTE — TELEPHONE ENCOUNTER
Pharmacy called and stated pt has not been taking the Tyvaso as prescribed.  Talked with pt and for the last mth he has only been doing the Tyvaso 64 mcg 2 and sometimes 3 times day.  Pt stated he was just getting lazy and was busy and not doing the 4x's daily.    The pharmacy wants to know if you want to back in down in dose and have him do 4x's daily and work his way back up.    Do you want him to do 32 mcg 4 x's a day  and titrate back up from there?      Please advise

## 2024-12-11 NOTE — TELEPHONE ENCOUNTER
Called [facility] mattson that the pharmacy is questioning the Tyvaso dose.  I need him to call back and let us know what he has been taking and for how long has he been doing that dose?

## 2024-12-11 NOTE — TELEPHONE ENCOUNTER
Caller: Pilo     Doctor: Dr. Holt     Reason for call: patient called and advised that he is taking Tyvaso 64mg about 2 times a day. He knows that he has been lazy or too tired to take the 4x's a day. He stated he will try and do better. Also, is requesting a call back to speak w/ Yajaira abot another issue he is having. Please advise.    Call back#: [phone]

## 2024-12-16 NOTE — TELEPHONE ENCOUNTER
PA for Tyvaso  DPI TITRATION KIT 16-32 & 48 MCG APPROVED     Date(s) approved 10/13/24-12/12/2025    Case #[id number]    Patient advised by          [x][employer] Message  [x]Phone call   []LMOM  []L/M to call office as no active Communication consent on file  []Unable to leave detailed message as VM not approved on Communication consent       Pharmacy advised by    [x]Fax  []Phone call    Approval letter scanned into Media Yes

## 2024-12-20 ENCOUNTER — OFFICE VISIT (OUTPATIENT)
Dept: FAMILY MEDICINE CLINIC | Facility: CLINIC | Age: 79
End: 2024-12-20
Payer: MEDICARE

## 2024-12-20 VITALS
TEMPERATURE: 97.6 F | SYSTOLIC BLOOD PRESSURE: 128 MMHG | DIASTOLIC BLOOD PRESSURE: 70 MMHG | BODY MASS INDEX: 18.51 KG/M2 | WEIGHT: 156.8 LBS | HEIGHT: 77 IN

## 2024-12-20 DIAGNOSIS — F51.01 PRIMARY INSOMNIA: Chronic | ICD-10-CM

## 2024-12-20 DIAGNOSIS — N13.8 BPH WITH OBSTRUCTION/LOWER URINARY TRACT SYMPTOMS: ICD-10-CM

## 2024-12-20 DIAGNOSIS — R63.4 ABNORMAL WEIGHT LOSS: Chronic | ICD-10-CM

## 2024-12-20 DIAGNOSIS — I27.20 PULMONARY HYPERTENSION (HCC): ICD-10-CM

## 2024-12-20 DIAGNOSIS — Z00.00 MEDICARE ANNUAL WELLNESS VISIT, SUBSEQUENT: Primary | ICD-10-CM

## 2024-12-20 DIAGNOSIS — R26.2 AMBULATORY DYSFUNCTION: ICD-10-CM

## 2024-12-20 DIAGNOSIS — N40.1 BPH WITH OBSTRUCTION/LOWER URINARY TRACT SYMPTOMS: ICD-10-CM

## 2024-12-20 DIAGNOSIS — J84.9 ILD (INTERSTITIAL LUNG DISEASE) (HCC): Chronic | ICD-10-CM

## 2024-12-20 PROBLEM — L30.9 DERMATITIS: Status: RESOLVED | Noted: 2022-02-18 | Resolved: 2024-12-20

## 2024-12-20 PROBLEM — D64.89 OTHER SPECIFIED ANEMIAS: Chronic | Status: RESOLVED | Noted: 2023-02-27 | Resolved: 2024-12-20

## 2024-12-20 PROBLEM — A60.01 HERPES SIMPLEX INFECTION OF PENIS: Status: RESOLVED | Noted: 2019-07-03 | Resolved: 2024-12-20

## 2024-12-20 PROBLEM — K64.8 OTHER HEMORRHOIDS: Status: RESOLVED | Noted: 2019-03-05 | Resolved: 2024-12-20

## 2024-12-20 PROBLEM — I44.4 LEFT ANTERIOR HEMIBLOCK: Status: RESOLVED | Noted: 2020-03-08 | Resolved: 2024-12-20

## 2024-12-20 PROBLEM — Z86.16 PERSONAL HISTORY OF COVID-19: Chronic | Status: RESOLVED | Noted: 2022-07-07 | Resolved: 2024-12-20

## 2024-12-20 PROCEDURE — G0444 DEPRESSION SCREEN ANNUAL: HCPCS

## 2024-12-20 PROCEDURE — G0439 PPPS, SUBSEQ VISIT: HCPCS

## 2024-12-20 RX ORDER — MIRTAZAPINE 15 MG/1
15 TABLET, FILM COATED ORAL
Qty: 30 TABLET | Refills: 0 | Status: SHIPPED | OUTPATIENT
Start: 2024-12-20

## 2024-12-20 NOTE — PROGRESS NOTES
Name: Pilo Manriquez      : 1945      MRN: [id number]  Encounter Provider: ONIEL Chavira  Encounter Date: 2024   Encounter department: [facility] [place]    Assessment & Plan  Medicare annual wellness visit, subsequent         Abnormal weight loss  Current medications: Mirtazapine 7.5 mg  Patient has not noticed a difference on this dosage we will increase to 15 mg  New medication: Mirtazapine 15mg  Orders:    mirtazapine (REMERON) 15 mg tablet; Take 1 tablet (15 mg total) by mouth daily at bedtime    Pulmonary hypertension (HCC)  Follows with pulmonology    Home 02   In pulmonary rehab   Current medication: Treprostinil 32mcg QID          ILD (interstitial lung disease) (HCC)  Under the management of pulmonology   Current medication: Ofev 150 mg twice daily with meals         BPH with obstruction/lower urinary tract symptoms  Current medications: Tamsulosin 0.4mg and finasteride 5mg          Ambulatory dysfunction  Ambulates with cane         Primary insomnia  Well controlled on temazepam 30mg            Depression Screening and Follow-up Plan: Patient was screened for depression during today's encounter. They screened negative with a PHQ-2 score of 0.      Preventive health issues were discussed with patient, and age appropriate screening tests were ordered as noted in patient's After Visit Summary. Personalized health advice and appropriate referrals for health education or preventive services given if needed, as noted in patient's After Visit Summary.    History of Present Illness     AWV       Patient Care Team:  ONIEL Chavira as PCP - General (Nurse Practitioner)  Alondra Khan MD (Palliative Care)    Review of Systems   Constitutional:  Negative for activity change, chills, fatigue and fever.   HENT:  Negative for congestion, ear pain, rhinorrhea and sore throat.    Eyes:  Negative for pain and visual disturbance.   Respiratory:  Negative for cough, chest  tightness and shortness of breath.    Cardiovascular:  Negative for chest pain, palpitations and leg swelling.   Gastrointestinal:  Negative for abdominal pain, constipation, diarrhea, nausea and vomiting.   Genitourinary:  Negative for decreased urine volume, dysuria, frequency, hematuria and urgency.   Musculoskeletal:  Negative for arthralgias and back pain.   Skin:  Negative for color change and rash.   Neurological:  Negative for seizures, syncope and headaches.   Psychiatric/Behavioral:  Negative for dysphoric mood. The patient is not nervous/anxious.    All other systems reviewed and are negative.    Medical History Reviewed by provider this encounter:  Tobacco  Allergies  Meds  Problems  Med Hx  Surg Hx  Fam Hx       Annual Wellness Visit Questionnaire   Pilo is here for his Subsequent Wellness visit. Last Medicare Wellness visit information reviewed, patient interviewed and updates made to the record today.      Health Risk Assessment:   Patient rates overall health as poor. Patient feels that their physical health rating is slightly worse. Patient is satisfied with their life. Eyesight was rated as same. Hearing was rated as same. Patient feels that their emotional and mental health rating is same. Patients states they are never, rarely angry. Patient states they are sometimes unusually tired/fatigued. Pain experienced in the last 7 days has been some. Patient's pain rating has been 3/10. Patient states that he has experienced no weight loss or gain in last 6 months.     Depression Screening:   PHQ-2 Score: 0      Fall Risk Screening:   In the past year, patient has experienced: no history of falling in past year      Home Safety:  Patient does not have trouble with stairs inside or outside of their home. Patient has working smoke alarms and has working carbon monoxide detector. Home safety hazards include: none.     Nutrition:   Current diet is Regular.     Medications:   Patient is not currently  taking any over-the-counter supplements. Patient is able to manage medications.     Activities of Daily Living (ADLs)/Instrumental Activities of Daily Living (IADLs):   Walk and transfer into and out of bed and chair?: Yes  Dress and groom yourself?: Yes    Bathe or shower yourself?: Yes    Feed yourself? Yes  Do your laundry/housekeeping?: Yes  Manage your money, pay your bills and track your expenses?: Yes  Make your own meals?: Yes    Do your own shopping?: Yes    Previous Hospitalizations:   Any hospitalizations or ED visits within the last 12 months?: No      Advance Care Planning:   Living will: Yes    Durable POA for healthcare: Yes    Advanced directive: Yes      PREVENTIVE SCREENINGS      Cardiovascular Screening:    General: Screening Current      Diabetes Screening:     General: Screening Current      Colorectal Cancer Screening:     General: Screening Not Indicated      Prostate Cancer Screening:    General: Screening Not Indicated      Osteoporosis Screening:    General: Screening Not Indicated      Abdominal Aortic Aneurysm (AAA) Screening:    Risk factors include: tobacco use        Lung Cancer Screening:     General: Screening Not Indicated      Hepatitis C Screening:    General: Screening Current    Screening, Brief Intervention, and Referral to Treatment (SBIRT)    Screening  Typical number of drinks in a day: 0  Typical number of drinks in a week: 1  Interpretation: Low risk drinking behavior.    AUDIT-C Screenin) How often did you have a drink containing alcohol in the past year? monthly or less  2) How many drinks did you have on a typical day when you were drinking in the past year? 0  3) How often did you have 6 or more drinks on one occasion in the past year? never    AUDIT-C Score: 1  Interpretation: Score 0-3 (male): Negative screen for alcohol misuse    Single Item Drug Screening:  How often have you used an illegal drug (including marijuana) or a prescription medication for  "non-medical reasons in the past year? never    Single Item Drug Screen Score: 0  Interpretation: Negative screen for possible drug use disorder    Brief Intervention  Alcohol & drug use screenings were reviewed. No concerns regarding substance use disorder identified.     Time Spent  Time spent screening/evaluating the patient for alcohol misuse: 0 minutes. Time spent providing alcohol/substance abuse assessment and intervention services: 0 minutes.    Annual Depression Screening  Time spent screening and evaluating the patient for depression during today's encounter was 5 minutes.    Other Counseling Topics:   Car/seat belt/driving safety, skin self-exam, sunscreen and calcium and vitamin D intake and regular weightbearing exercise.     Social Drivers of Health     Financial Resource Strain: Low Risk  (9/1/2023)    Overall Financial Resource Strain (CARDIA)     Difficulty of Paying Living Expenses: Not very hard   Food Insecurity: No Food Insecurity (12/20/2024)    Hunger Vital Sign     Worried About Running Out of Food in the Last Year: Never true     Ran Out of Food in the Last Year: Never true   Transportation Needs: No Transportation Needs (12/20/2024)    PRAPARE - Transportation     Lack of Transportation (Medical): No     Lack of Transportation (Non-Medical): No   Housing Stability: Low Risk  (12/20/2024)    Housing Stability Vital Sign     Unable to Pay for Housing in the Last Year: No     Number of Times Moved in the Last Year: 0     Homeless in the Last Year: No   Utilities: Not At Risk (12/20/2024)    [facility] Utilities     Threatened with loss of utilities: No     No results found.    Objective   /70 (BP Location: Left arm, Patient Position: Sitting, Cuff Size: Large)   Temp 97.6 °F (36.4 °C) (Temporal)   Ht 6' 5\" (1.956 m)   Wt 71.1 kg (156 lb 12.8 oz)   BMI 18.59 kg/m²     Physical Exam  Vitals and nursing note reviewed.   Constitutional:       Appearance: Normal appearance. He is well-developed. "   HENT:      Head: Normocephalic and atraumatic.      Right Ear: Tympanic membrane, ear canal and external ear normal. There is no impacted cerumen.      Left Ear: Tympanic membrane, ear canal and external ear normal. There is no impacted cerumen.      Nose: Nose normal.      Mouth/Throat:      Mouth: Mucous membranes are moist.      Pharynx: Oropharynx is clear.   Eyes:      Extraocular Movements: Extraocular movements intact.      Conjunctiva/sclera: Conjunctivae normal.      Pupils: Pupils are equal, round, and reactive to light.   Cardiovascular:      Rate and Rhythm: Normal rate and regular rhythm.      Pulses: Normal pulses.      Heart sounds: Normal heart sounds. No murmur heard.  Pulmonary:      Effort: Pulmonary effort is normal. No respiratory distress.      Breath sounds: Normal breath sounds.   Abdominal:      General: Bowel sounds are normal.      Palpations: Abdomen is soft.      Tenderness: There is no abdominal tenderness.   Musculoskeletal:         General: No swelling. Normal range of motion.      Cervical back: Normal range of motion and neck supple.      Right lower leg: No edema.      Left lower leg: No edema.   Skin:     General: Skin is warm and dry.      Capillary Refill: Capillary refill takes less than 2 seconds.   Neurological:      General: No focal deficit present.      Mental Status: He is alert and oriented to person, place, and time. Mental status is at baseline.   Psychiatric:         Mood and Affect: Mood normal.         Behavior: Behavior normal.         Thought Content: Thought content normal.         Judgment: Judgment normal.       Administrative Statements   I have spent a total time of 40 minutes in caring for this patient on the day of the visit/encounter including Diagnostic results, Prognosis, Risks and benefits of tx options, Instructions for management, Patient and family education, Importance of tx compliance, Risk factor reductions, Impressions, Counseling /  Coordination of care, Documenting in the medical record, Reviewing / ordering tests, medicine, procedures  , and Obtaining or reviewing history  .     Patient Instructions   Medicare Preventive Visit Patient Instructions  Thank you for completing your Welcome to Medicare Visit or Medicare Annual Wellness Visit today. Your next wellness visit will be due in one year (12/21/2025).  The screening/preventive services that you may require over the next 5-10 years are detailed below. Some tests may not apply to you based off risk factors and/or age. Screening tests ordered at today's visit but not completed yet may show as past due. Also, please note that scanned in results may not display below.  Preventive Screenings:  Service Recommendations Previous Testing/Comments   Colorectal Cancer Screening  Colonoscopy    Fecal Occult Blood Test (FOBT)/Fecal Immunochemical Test (FIT)  Fecal DNA/Cologuard Test  Flexible Sigmoidoscopy Age: 45-75 years old   Colonoscopy: every 10 years (May be performed more frequently if at higher risk)  OR  FOBT/FIT: every 1 year  OR  Cologuard: every 3 years  OR  Sigmoidoscopy: every 5 years  Screening may be recommended earlier than age 45 if at higher risk for colorectal cancer. Also, an individualized decision between you and your healthcare provider will decide whether screening between the ages of 76-85 would be appropriate. Colonoscopy: 06/29/2021  FOBT/FIT: Not on file  Cologuard: Not on file  Sigmoidoscopy: Not on file    Screening Not Indicated     Prostate Cancer Screening Individualized decision between patient and health care provider in men between ages of 55-69   Medicare will cover every 12 months beginning on the day after your 50th birthday PSA: 0.632 ng/mL     Screening Not Indicated     Hepatitis C Screening Once for adults born between 1945 and 1965  More frequently in patients at high risk for Hepatitis C Hep C Antibody: 06/03/2020    Screening Current   Diabetes Screening  1-2 times per year if you're at risk for diabetes or have pre-diabetes Fasting glucose: 76 mg/dL (6/28/2024)  A1C: No results in last 5 years (No results in last 5 years)  Screening Current   Cholesterol Screening Once every 5 years if you don't have a lipid disorder. May order more often based on risk factors. Lipid panel: 12/03/2021  Screening Current      Other Preventive Screenings Covered by Medicare:  Abdominal Aortic Aneurysm (AAA) Screening: covered once if your at risk. You're considered to be at risk if you have a family history of AAA or a male between the age of 65-75 who smoking at least 100 cigarettes in your lifetime.  Lung Cancer Screening: covers low dose CT scan once per year if you meet all of the following conditions: (1) Age 55-77; (2) No signs or symptoms of lung cancer; (3) Current smoker or have quit smoking within the last 15 years; (4) You have a tobacco smoking history of at least 20 pack years (packs per day x number of years you smoked); (5) You get a written order from a healthcare provider.  Glaucoma Screening: covered annually if you're considered high risk: (1) You have diabetes OR (2) Family history of glaucoma OR (3)  aged 50 and older OR (4)  American aged 65 and older  Osteoporosis Screening: covered every 2 years if you meet one of the following conditions: (1) Have a vertebral abnormality; (2) On glucocorticoid therapy for more than 3 months; (3) Have primary hyperparathyroidism; (4) On osteoporosis medications and need to assess response to drug therapy.  HIV Screening: covered annually if you're between the age of 15-65. Also covered annually if you are younger than 15 and older than 65 with risk factors for HIV infection. For pregnant patients, it is covered up to 3 times per pregnancy.    Immunizations:  Immunization Recommendations   Influenza Vaccine Annual influenza vaccination during flu season is recommended for all persons aged >= 6 months who  do not have contraindications   Pneumococcal Vaccine   * Pneumococcal conjugate vaccine = PCV13 (Prevnar 13), PCV15 (Vaxneuvance), PCV20 (Prevnar 20)  * Pneumococcal polysaccharide vaccine = PPSV23 (Pneumovax) Adults 19-63 yo with certain risk factors or if 65+ yo  If never received any pneumonia vaccine: recommend Prevnar 20 (PCV20)  Give PCV20 if previously received 1 dose of PCV13 or PPSV23   Hepatitis B Vaccine 3 dose series if at intermediate or high risk (ex: diabetes, end stage renal disease, liver disease)   Respiratory syncytial virus (RSV) Vaccine - COVERED BY MEDICARE PART D  * RSVPreF3 (Arexvy) CDC recommends that adults 60 years of age and older may receive a single dose of RSV vaccine using shared clinical decision-making (SCDM)   Tetanus (Td) Vaccine - COST NOT COVERED BY MEDICARE PART B Following completion of primary series, a booster dose should be given every 10 years to maintain immunity against tetanus. Td may also be given as tetanus wound prophylaxis.   Tdap Vaccine - COST NOT COVERED BY MEDICARE PART B Recommended at least once for all adults. For pregnant patients, recommended with each pregnancy.   Shingles Vaccine (Shingrix) - COST NOT COVERED BY MEDICARE PART B  2 shot series recommended in those 19 years and older who have or will have weakened immune systems or those 50 years and older     Health Maintenance Due:      Topic Date Due    Hepatitis C Screening  Completed    Colorectal Cancer Screening  Discontinued     Immunizations Due:  There are no preventive care reminders to display for this patient.  Advance Directives   What are advance directives?  Advance directives are legal documents that state your wishes and plans for medical care. These plans are made ahead of time in case you lose your ability to make decisions for yourself. Advance directives can apply to any medical decision, such as the treatments you want, and if you want to donate organs.   What are the types of advance  directives?  There are many types of advance directives, and each state has rules about how to use them. You may choose a combination of any of the following:  Living will:  This is a written record of the treatment you want. You can also choose which treatments you do not want, which to limit, and which to stop at a certain time. This includes surgery, medicine, IV fluid, and tube feedings.   Durable power of  for healthcare (DPAHC):  This is a written record that states who you want to make healthcare choices for you when you are unable to make them for yourself. This person, called a proxy, is usually a family member or a friend. You may choose more than 1 proxy.  Do not resuscitate (DNR) order:  A DNR order is used in case your heart stops beating or you stop breathing. It is a request not to have certain forms of treatment, such as CPR. A DNR order may be included in other types of advance directives.  Medical directive:  This covers the care that you want if you are in a coma, near death, or unable to make decisions for yourself. You can list the treatments you want for each condition. Treatment may include pain medicine, surgery, blood transfusions, dialysis, IV or tube feedings, and a ventilator (breathing machine).  Values history:  This document has questions about your views, beliefs, and how you feel and think about life. This information can help others choose the care that you would choose.  Why are advance directives important?  An advance directive helps you control your care. Although spoken wishes may be used, it is better to have your wishes written down. Spoken wishes can be misunderstood, or not followed. Treatments may be given even if you do not want them. An advance directive may make it easier for your family to make difficult choices about your care.       © Copyright [employer] 2018 Information is for End User's use only and may not be sold, redistributed or otherwise used for  commercial purposes. All illustrations and images included in CareNotes® are the copyrighted property of [employer] or [employer]    Medicare Preventive Visit Patient Instructions  Thank you for completing your Welcome to Medicare Visit or Medicare Annual Wellness Visit today. Your next wellness visit will be due in one year (12/21/2025).  The screening/preventive services that you may require over the next 5-10 years are detailed below. Some tests may not apply to you based off risk factors and/or age. Screening tests ordered at today's visit but not completed yet may show as past due. Also, please note that scanned in results may not display below.  Preventive Screenings:  Service Recommendations Previous Testing/Comments   Colorectal Cancer Screening  Colonoscopy    Fecal Occult Blood Test (FOBT)/Fecal Immunochemical Test (FIT)  Fecal DNA/Cologuard Test  Flexible Sigmoidoscopy Age: 45-75 years old   Colonoscopy: every 10 years (May be performed more frequently if at higher risk)  OR  FOBT/FIT: every 1 year  OR  Cologuard: every 3 years  OR  Sigmoidoscopy: every 5 years  Screening may be recommended earlier than age 45 if at higher risk for colorectal cancer. Also, an individualized decision between you and your healthcare provider will decide whether screening between the ages of 76-85 would be appropriate. Colonoscopy: 06/29/2021  FOBT/FIT: Not on file  Cologuard: Not on file  Sigmoidoscopy: Not on file    Screening Not Indicated     Prostate Cancer Screening Individualized decision between patient and health care provider in men between ages of 55-69   Medicare will cover every 12 months beginning on the day after your 50th birthday PSA: 0.632 ng/mL     Screening Not Indicated     Hepatitis C Screening Once for adults born between 1945 and 1965  More frequently in patients at high risk for Hepatitis C Hep C Antibody: 06/03/2020    Screening Current   Diabetes Screening 1-2 times per year if you're at  risk for diabetes or have pre-diabetes Fasting glucose: 76 mg/dL (6/28/2024)  A1C: No results in last 5 years (No results in last 5 years)  Screening Current   Cholesterol Screening Once every 5 years if you don't have a lipid disorder. May order more often based on risk factors. Lipid panel: 12/03/2021  Screening Current      Other Preventive Screenings Covered by Medicare:  Abdominal Aortic Aneurysm (AAA) Screening: covered once if your at risk. You're considered to be at risk if you have a family history of AAA or a male between the age of 65-75 who smoking at least 100 cigarettes in your lifetime.  Lung Cancer Screening: covers low dose CT scan once per year if you meet all of the following conditions: (1) Age 55-77; (2) No signs or symptoms of lung cancer; (3) Current smoker or have quit smoking within the last 15 years; (4) You have a tobacco smoking history of at least 20 pack years (packs per day x number of years you smoked); (5) You get a written order from a healthcare provider.  Glaucoma Screening: covered annually if you're considered high risk: (1) You have diabetes OR (2) Family history of glaucoma OR (3)  aged 50 and older OR (4)  American aged 65 and older  Osteoporosis Screening: covered every 2 years if you meet one of the following conditions: (1) Have a vertebral abnormality; (2) On glucocorticoid therapy for more than 3 months; (3) Have primary hyperparathyroidism; (4) On osteoporosis medications and need to assess response to drug therapy.  HIV Screening: covered annually if you're between the age of 15-65. Also covered annually if you are younger than 15 and older than 65 with risk factors for HIV infection. For pregnant patients, it is covered up to 3 times per pregnancy.    Immunizations:  Immunization Recommendations   Influenza Vaccine Annual influenza vaccination during flu season is recommended for all persons aged >= 6 months who do not have contraindications    Pneumococcal Vaccine   * Pneumococcal conjugate vaccine = PCV13 (Prevnar 13), PCV15 (Vaxneuvance), PCV20 (Prevnar 20)  * Pneumococcal polysaccharide vaccine = PPSV23 (Pneumovax) Adults 19-65 yo with certain risk factors or if 65+ yo  If never received any pneumonia vaccine: recommend Prevnar 20 (PCV20)  Give PCV20 if previously received 1 dose of PCV13 or PPSV23   Hepatitis B Vaccine 3 dose series if at intermediate or high risk (ex: diabetes, end stage renal disease, liver disease)   Respiratory syncytial virus (RSV) Vaccine - COVERED BY MEDICARE PART D  * RSVPreF3 (Arexvy) CDC recommends that adults 60 years of age and older may receive a single dose of RSV vaccine using shared clinical decision-making (SCDM)   Tetanus (Td) Vaccine - COST NOT COVERED BY MEDICARE PART B Following completion of primary series, a booster dose should be given every 10 years to maintain immunity against tetanus. Td may also be given as tetanus wound prophylaxis.   Tdap Vaccine - COST NOT COVERED BY MEDICARE PART B Recommended at least once for all adults. For pregnant patients, recommended with each pregnancy.   Shingles Vaccine (Shingrix) - COST NOT COVERED BY MEDICARE PART B  2 shot series recommended in those 19 years and older who have or will have weakened immune systems or those 50 years and older     Health Maintenance Due:      Topic Date Due    Hepatitis C Screening  Completed    Colorectal Cancer Screening  Discontinued     Immunizations Due:  There are no preventive care reminders to display for this patient.  Advance Directives   What are advance directives?  Advance directives are legal documents that state your wishes and plans for medical care. These plans are made ahead of time in case you lose your ability to make decisions for yourself. Advance directives can apply to any medical decision, such as the treatments you want, and if you want to donate organs.   What are the types of advance directives?  There are many  types of advance directives, and each state has rules about how to use them. You may choose a combination of any of the following:  Living will:  This is a written record of the treatment you want. You can also choose which treatments you do not want, which to limit, and which to stop at a certain time. This includes surgery, medicine, IV fluid, and tube feedings.   Durable power of  for healthcare (DPAHC):  This is a written record that states who you want to make healthcare choices for you when you are unable to make them for yourself. This person, called a proxy, is usually a family member or a friend. You may choose more than 1 proxy.  Do not resuscitate (DNR) order:  A DNR order is used in case your heart stops beating or you stop breathing. It is a request not to have certain forms of treatment, such as CPR. A DNR order may be included in other types of advance directives.  Medical directive:  This covers the care that you want if you are in a coma, near death, or unable to make decisions for yourself. You can list the treatments you want for each condition. Treatment may include pain medicine, surgery, blood transfusions, dialysis, IV or tube feedings, and a ventilator (breathing machine).  Values history:  This document has questions about your views, beliefs, and how you feel and think about life. This information can help others choose the care that you would choose.  Why are advance directives important?  An advance directive helps you control your care. Although spoken wishes may be used, it is better to have your wishes written down. Spoken wishes can be misunderstood, or not followed. Treatments may be given even if you do not want them. An advance directive may make it easier for your family to make difficult choices about your care.       © Copyright [employer] 2018 Information is for End User's use only and may not be sold, redistributed or otherwise used for commercial purposes. All  illustrations and images included in CareNotes® are the copyrighted property of [employer] or [employer]

## 2024-12-20 NOTE — ASSESSMENT & PLAN NOTE
Under the management of pulmonology   Current medication: Ofev 150 mg twice daily with meals

## 2024-12-20 NOTE — PATIENT INSTRUCTIONS
Medicare Preventive Visit Patient Instructions  Thank you for completing your Welcome to Medicare Visit or Medicare Annual Wellness Visit today. Your next wellness visit will be due in one year (12/21/2025).  The screening/preventive services that you may require over the next 5-10 years are detailed below. Some tests may not apply to you based off risk factors and/or age. Screening tests ordered at today's visit but not completed yet may show as past due. Also, please note that scanned in results may not display below.  Preventive Screenings:  Service Recommendations Previous Testing/Comments   Colorectal Cancer Screening  Colonoscopy    Fecal Occult Blood Test (FOBT)/Fecal Immunochemical Test (FIT)  Fecal DNA/Cologuard Test  Flexible Sigmoidoscopy Age: 45-75 years old   Colonoscopy: every 10 years (May be performed more frequently if at higher risk)  OR  FOBT/FIT: every 1 year  OR  Cologuard: every 3 years  OR  Sigmoidoscopy: every 5 years  Screening may be recommended earlier than age 45 if at higher risk for colorectal cancer. Also, an individualized decision between you and your healthcare provider will decide whether screening between the ages of 76-85 would be appropriate. Colonoscopy: 06/29/2021  FOBT/FIT: Not on file  Cologuard: Not on file  Sigmoidoscopy: Not on file    Screening Not Indicated     Prostate Cancer Screening Individualized decision between patient and health care provider in men between ages of 55-69   Medicare will cover every 12 months beginning on the day after your 50th birthday PSA: 0.632 ng/mL     Screening Not Indicated     Hepatitis C Screening Once for adults born between 1945 and 1965  More frequently in patients at high risk for Hepatitis C Hep C Antibody: 06/03/2020    Screening Current   Diabetes Screening 1-2 times per year if you're at risk for diabetes or have pre-diabetes Fasting glucose: 76 mg/dL (6/28/2024)  A1C: No results in last 5 years (No results in last 5  years)  Screening Current   Cholesterol Screening Once every 5 years if you don't have a lipid disorder. May order more often based on risk factors. Lipid panel: 12/03/2021  Screening Current      Other Preventive Screenings Covered by Medicare:  Abdominal Aortic Aneurysm (AAA) Screening: covered once if your at risk. You're considered to be at risk if you have a family history of AAA or a male between the age of 65-75 who smoking at least 100 cigarettes in your lifetime.  Lung Cancer Screening: covers low dose CT scan once per year if you meet all of the following conditions: (1) Age 55-77; (2) No signs or symptoms of lung cancer; (3) Current smoker or have quit smoking within the last 15 years; (4) You have a tobacco smoking history of at least 20 pack years (packs per day x number of years you smoked); (5) You get a written order from a healthcare provider.  Glaucoma Screening: covered annually if you're considered high risk: (1) You have diabetes OR (2) Family history of glaucoma OR (3)  aged 50 and older OR (4)  American aged 65 and older  Osteoporosis Screening: covered every 2 years if you meet one of the following conditions: (1) Have a vertebral abnormality; (2) On glucocorticoid therapy for more than 3 months; (3) Have primary hyperparathyroidism; (4) On osteoporosis medications and need to assess response to drug therapy.  HIV Screening: covered annually if you're between the age of 15-65. Also covered annually if you are younger than 15 and older than 65 with risk factors for HIV infection. For pregnant patients, it is covered up to 3 times per pregnancy.    Immunizations:  Immunization Recommendations   Influenza Vaccine Annual influenza vaccination during flu season is recommended for all persons aged >= 6 months who do not have contraindications   Pneumococcal Vaccine   * Pneumococcal conjugate vaccine = PCV13 (Prevnar 13), PCV15 (Vaxneuvance), PCV20 (Prevnar 20)  *  Pneumococcal polysaccharide vaccine = PPSV23 (Pneumovax) Adults 19-63 yo with certain risk factors or if 65+ yo  If never received any pneumonia vaccine: recommend Prevnar 20 (PCV20)  Give PCV20 if previously received 1 dose of PCV13 or PPSV23   Hepatitis B Vaccine 3 dose series if at intermediate or high risk (ex: diabetes, end stage renal disease, liver disease)   Respiratory syncytial virus (RSV) Vaccine - COVERED BY MEDICARE PART D  * RSVPreF3 (Arexvy) CDC recommends that adults 60 years of age and older may receive a single dose of RSV vaccine using shared clinical decision-making (SCDM)   Tetanus (Td) Vaccine - COST NOT COVERED BY MEDICARE PART B Following completion of primary series, a booster dose should be given every 10 years to maintain immunity against tetanus. Td may also be given as tetanus wound prophylaxis.   Tdap Vaccine - COST NOT COVERED BY MEDICARE PART B Recommended at least once for all adults. For pregnant patients, recommended with each pregnancy.   Shingles Vaccine (Shingrix) - COST NOT COVERED BY MEDICARE PART B  2 shot series recommended in those 19 years and older who have or will have weakened immune systems or those 50 years and older     Health Maintenance Due:      Topic Date Due   • Hepatitis C Screening  Completed   • Colorectal Cancer Screening  Discontinued     Immunizations Due:  There are no preventive care reminders to display for this patient.  Advance Directives   What are advance directives?  Advance directives are legal documents that state your wishes and plans for medical care. These plans are made ahead of time in case you lose your ability to make decisions for yourself. Advance directives can apply to any medical decision, such as the treatments you want, and if you want to donate organs.   What are the types of advance directives?  There are many types of advance directives, and each state has rules about how to use them. You may choose a combination of any of the  following:  Living will:  This is a written record of the treatment you want. You can also choose which treatments you do not want, which to limit, and which to stop at a certain time. This includes surgery, medicine, IV fluid, and tube feedings.   Durable power of  for healthcare (DPAHC):  This is a written record that states who you want to make healthcare choices for you when you are unable to make them for yourself. This person, called a proxy, is usually a family member or a friend. You may choose more than 1 proxy.  Do not resuscitate (DNR) order:  A DNR order is used in case your heart stops beating or you stop breathing. It is a request not to have certain forms of treatment, such as CPR. A DNR order may be included in other types of advance directives.  Medical directive:  This covers the care that you want if you are in a coma, near death, or unable to make decisions for yourself. You can list the treatments you want for each condition. Treatment may include pain medicine, surgery, blood transfusions, dialysis, IV or tube feedings, and a ventilator (breathing machine).  Values history:  This document has questions about your views, beliefs, and how you feel and think about life. This information can help others choose the care that you would choose.  Why are advance directives important?  An advance directive helps you control your care. Although spoken wishes may be used, it is better to have your wishes written down. Spoken wishes can be misunderstood, or not followed. Treatments may be given even if you do not want them. An advance directive may make it easier for your family to make difficult choices about your care.       © Copyright [employer] 2018 Information is for End User's use only and may not be sold, redistributed or otherwise used for commercial purposes. All illustrations and images included in CareNotes® are the copyrighted property of [employer] Inc. or [employer]  Health Medicare Preventive Visit Patient Instructions  Thank you for completing your Welcome to Medicare Visit or Medicare Annual Wellness Visit today. Your next wellness visit will be due in one year (12/21/2025).  The screening/preventive services that you may require over the next 5-10 years are detailed below. Some tests may not apply to you based off risk factors and/or age. Screening tests ordered at today's visit but not completed yet may show as past due. Also, please note that scanned in results may not display below.  Preventive Screenings:  Service Recommendations Previous Testing/Comments   Colorectal Cancer Screening  Colonoscopy    Fecal Occult Blood Test (FOBT)/Fecal Immunochemical Test (FIT)  Fecal DNA/Cologuard Test  Flexible Sigmoidoscopy Age: 45-75 years old   Colonoscopy: every 10 years (May be performed more frequently if at higher risk)  OR  FOBT/FIT: every 1 year  OR  Cologuard: every 3 years  OR  Sigmoidoscopy: every 5 years  Screening may be recommended earlier than age 45 if at higher risk for colorectal cancer. Also, an individualized decision between you and your healthcare provider will decide whether screening between the ages of 76-85 would be appropriate. Colonoscopy: 06/29/2021  FOBT/FIT: Not on file  Cologuard: Not on file  Sigmoidoscopy: Not on file    Screening Not Indicated     Prostate Cancer Screening Individualized decision between patient and health care provider in men between ages of 55-69   Medicare will cover every 12 months beginning on the day after your 50th birthday PSA: 0.632 ng/mL     Screening Not Indicated     Hepatitis C Screening Once for adults born between 1945 and 1965  More frequently in patients at high risk for Hepatitis C Hep C Antibody: 06/03/2020    Screening Current   Diabetes Screening 1-2 times per year if you're at risk for diabetes or have pre-diabetes Fasting glucose: 76 mg/dL (6/28/2024)  A1C: No results in last 5 years (No results in last 5  following:  Living will:  This is a written record of the treatment you want. You can also choose which treatments you do not want, which to limit, and which to stop at a certain time. This includes surgery, medicine, IV fluid, and tube feedings.   Durable power of  for healthcare (DPAHC):  This is a written record that states who you want to make healthcare choices for you when you are unable to make them for yourself. This person, called a proxy, is usually a family member or a friend. You may choose more than 1 proxy.  Do not resuscitate (DNR) order:  A DNR order is used in case your heart stops beating or you stop breathing. It is a request not to have certain forms of treatment, such as CPR. A DNR order may be included in other types of advance directives.  Medical directive:  This covers the care that you want if you are in a coma, near death, or unable to make decisions for yourself. You can list the treatments you want for each condition. Treatment may include pain medicine, surgery, blood transfusions, dialysis, IV or tube feedings, and a ventilator (breathing machine).  Values history:  This document has questions about your views, beliefs, and how you feel and think about life. This information can help others choose the care that you would choose.  Why are advance directives important?  An advance directive helps you control your care. Although spoken wishes may be used, it is better to have your wishes written down. Spoken wishes can be misunderstood, or not followed. Treatments may be given even if you do not want them. An advance directive may make it easier for your family to make difficult choices about your care.       © Copyright "[employer] 2018 Information is for End User's use only and may not be sold, redistributed or otherwise used for commercial purposes. All illustrations and images included in CareNotes® are the copyrighted property of [employer]., Inc. or [employer]

## 2024-12-20 NOTE — ASSESSMENT & PLAN NOTE
Follows with pulmonology    Home 02   In pulmonary rehab   Current medication: Treprostinil 32mcg QID

## 2024-12-20 NOTE — ASSESSMENT & PLAN NOTE
Current medications: Mirtazapine 7.5 mg  Patient has not noticed a difference on this dosage we will increase to 15 mg  New medication: Mirtazapine 15mg  Orders:    mirtazapine (REMERON) 15 mg tablet; Take 1 tablet (15 mg total) by mouth daily at bedtime

## 2024-12-27 DIAGNOSIS — G47.00 INSOMNIA, UNSPECIFIED TYPE: ICD-10-CM

## 2024-12-27 RX ORDER — TEMAZEPAM 30 MG/1
30 CAPSULE ORAL
Qty: 60 CAPSULE | Refills: 0 | OUTPATIENT
Start: 2024-12-27

## 2024-12-27 NOTE — TELEPHONE ENCOUNTER
Pt called in for the following med refill to be called in to his local pharmacy please do help. Thanks

## 2024-12-30 NOTE — TELEPHONE ENCOUNTER
Pt called in to check on update. Relayed providers message. Pt has enough medication until next month but since advised by pharmacy he has no more refills he thought he would call in now. Advised pt it was too soon and to call back once he is due for next refill.

## 2025-01-14 ENCOUNTER — TELEPHONE (OUTPATIENT)
Dept: FAMILY MEDICINE CLINIC | Facility: CLINIC | Age: 80
End: 2025-01-14

## 2025-01-14 NOTE — TELEPHONE ENCOUNTER
Patient called the RX Refill Line. Message is being forwarded to the office.     Patient is requesting   Patient called asking to refill his Fluoxetine 20mg . This medication was discontinued on 10/29/24 and was to also discontinue Megace. Replacing with Mirtazapine 15mg 1 tablet daily.Patient seems to believe he is to be still on fluoxetine.    Please contact patient and verify if still taking the fluoxetine and also the mirtazapine.    Please contact patient at [phone]

## 2025-01-15 ENCOUNTER — APPOINTMENT (OUTPATIENT)
Dept: LAB | Facility: CLINIC | Age: 80
End: 2025-01-15
Payer: MEDICARE

## 2025-01-15 ENCOUNTER — OFFICE VISIT (OUTPATIENT)
Dept: PULMONOLOGY | Facility: CLINIC | Age: 80
End: 2025-01-15
Payer: MEDICARE

## 2025-01-15 VITALS
WEIGHT: 167 LBS | SYSTOLIC BLOOD PRESSURE: 114 MMHG | TEMPERATURE: 97.6 F | DIASTOLIC BLOOD PRESSURE: 70 MMHG | OXYGEN SATURATION: 95 % | BODY MASS INDEX: 19.72 KG/M2 | HEIGHT: 77 IN | HEART RATE: 76 BPM

## 2025-01-15 DIAGNOSIS — E44.1 MILD PROTEIN-CALORIE MALNUTRITION (HCC): ICD-10-CM

## 2025-01-15 DIAGNOSIS — R06.09 DOE (DYSPNEA ON EXERTION): ICD-10-CM

## 2025-01-15 DIAGNOSIS — G47.33 OSA (OBSTRUCTIVE SLEEP APNEA): ICD-10-CM

## 2025-01-15 DIAGNOSIS — J84.9 ILD (INTERSTITIAL LUNG DISEASE) (HCC): Primary | ICD-10-CM

## 2025-01-15 DIAGNOSIS — J84.9 ILD (INTERSTITIAL LUNG DISEASE) (HCC): ICD-10-CM

## 2025-01-15 DIAGNOSIS — I27.20 PULMONARY HYPERTENSION (HCC): ICD-10-CM

## 2025-01-15 DIAGNOSIS — J96.11 CHRONIC HYPOXEMIC RESPIRATORY FAILURE (HCC): ICD-10-CM

## 2025-01-15 DIAGNOSIS — F32.A DEPRESSION, UNSPECIFIED DEPRESSION TYPE: ICD-10-CM

## 2025-01-15 DIAGNOSIS — J30.89 NON-SEASONAL ALLERGIC RHINITIS, UNSPECIFIED TRIGGER: ICD-10-CM

## 2025-01-15 LAB
ANION GAP SERPL CALCULATED.3IONS-SCNC: 5 MMOL/L (ref 4–13)
BASOPHILS # BLD AUTO: 0.03 THOUSANDS/ΜL (ref 0–0.1)
BASOPHILS NFR BLD AUTO: 0 % (ref 0–1)
BUN SERPL-MCNC: 19 MG/DL (ref 5–25)
CALCIUM SERPL-MCNC: 9.3 MG/DL (ref 8.4–10.2)
CHLORIDE SERPL-SCNC: 98 MMOL/L (ref 96–108)
CO2 SERPL-SCNC: 34 MMOL/L (ref 21–32)
CREAT SERPL-MCNC: 0.7 MG/DL (ref 0.6–1.3)
EOSINOPHIL # BLD AUTO: 0.15 THOUSAND/ΜL (ref 0–0.61)
EOSINOPHIL NFR BLD AUTO: 2 % (ref 0–6)
ERYTHROCYTE [DISTWIDTH] IN BLOOD BY AUTOMATED COUNT: 13.6 % (ref 11.6–15.1)
GFR SERPL CREATININE-BSD FRML MDRD: 90 ML/MIN/1.73SQ M
GLUCOSE SERPL-MCNC: 109 MG/DL (ref 65–140)
HCT VFR BLD AUTO: 40.6 % (ref 36.5–49.3)
HGB BLD-MCNC: 12.6 G/DL (ref 12–17)
IMM GRANULOCYTES # BLD AUTO: 0.02 THOUSAND/UL (ref 0–0.2)
IMM GRANULOCYTES NFR BLD AUTO: 0 % (ref 0–2)
LYMPHOCYTES # BLD AUTO: 0.92 THOUSANDS/ΜL (ref 0.6–4.47)
LYMPHOCYTES NFR BLD AUTO: 14 % (ref 14–44)
MCH RBC QN AUTO: 31.9 PG (ref 26.8–34.3)
MCHC RBC AUTO-ENTMCNC: 31 G/DL (ref 31.4–37.4)
MCV RBC AUTO: 103 FL (ref 82–98)
MONOCYTES # BLD AUTO: 0.49 THOUSAND/ΜL (ref 0.17–1.22)
MONOCYTES NFR BLD AUTO: 7 % (ref 4–12)
NEUTROPHILS # BLD AUTO: 5.09 THOUSANDS/ΜL (ref 1.85–7.62)
NEUTS SEG NFR BLD AUTO: 77 % (ref 43–75)
NRBC BLD AUTO-RTO: 0 /100 WBCS
PLATELET # BLD AUTO: 259 THOUSANDS/UL (ref 149–390)
PMV BLD AUTO: 9.6 FL (ref 8.9–12.7)
POTASSIUM SERPL-SCNC: 4 MMOL/L (ref 3.5–5.3)
RBC # BLD AUTO: 3.95 MILLION/UL (ref 3.88–5.62)
SODIUM SERPL-SCNC: 137 MMOL/L (ref 135–147)
WBC # BLD AUTO: 6.7 THOUSAND/UL (ref 4.31–10.16)

## 2025-01-15 PROCEDURE — 36415 COLL VENOUS BLD VENIPUNCTURE: CPT

## 2025-01-15 PROCEDURE — 85025 COMPLETE CBC W/AUTO DIFF WBC: CPT

## 2025-01-15 PROCEDURE — 99214 OFFICE O/P EST MOD 30 MIN: CPT | Performed by: INTERNAL MEDICINE

## 2025-01-15 PROCEDURE — 80048 BASIC METABOLIC PNL TOTAL CA: CPT

## 2025-01-15 NOTE — PROGRESS NOTES
Office Progress Note - Pulmonary    Pilo Manriquez 79 y.o. male MRN: [id number]    Encounter: [id number]      Assessment:  Interstitial lung disease.  Pulmonary hypertension.  Chronic hypoxemic respiratory failure.  Dyspnea on exertion.  Depression.  Obstructive sleep apnea.  Allergic rhinitis.    Plan:     CBC and BMP today.    Ofev 150 mg twice a day.  Oxygen supplement 24 hours a day.  Tyvaso daily.  Fluoxetine 20 mg once a day.  Regular walks to improve stamina.  Follow-up in 3 months.    Discussion:   The patient's overall pulmonary status is declining.  However he continues to manage to live independently with the help of his daughter and aides that come in due to some house work for him.  For now we will continue with the same regimen with the Ofev 150 mg twice a day which he is tolerating fairly well.  He will continue with the oxygen supplement at 6 L.  His pulmonary hypertension is well treated.  He takes Tyvaso once a day.  We talked about his nutritional needs and to keep himself hydrated.  Given the episode of severe dehydration I have ordered CBC and BMP today to rule out possibility of electrolyte disturbance and kidney function impairment.  I will see him in 3 months.      Subjective:   The patient is here for a follow-up visit.  He has noticed that he gets short of breath quicker and takes him much longer time to recover.  Also his O2 saturation dropped significantly when he exerts himself.  No significant cough, wheezing or sputum production.  No symptoms while he is sleeping at night.  He is on Ofev to 50 mg twice a day.  He deniesdiarrhea.  Also taken Tyvaso once a day.    Review of systems:  A 12 point system review is done and aside from what is stated above the rest of the review of systems is negative.      Family history and social history are reviewed.    Medications list is reviewed.      Vitals: Blood pressure 114/70, pulse 76, temperature 97.6 °F (36.4 °C), temperature source Tympanic  "Core, height 6' 5\" (1.956 m), weight 75.8 kg (167 lb), SpO2 95%.,     Physical Exam  Gen: Awake, alert, oriented x 3, no acute distress  HEENT: Mucous membranes moist, no oral lesions, no thrush  NECK: No accessory muscle use, JVP not elevated  Cardiac: Regular, single S1, single S2, no murmurs, no rubs, no gallops  Lungs: Bilateral posterior crackles.  Abdomen: normoactive bowel sounds, soft nontender, nondistended, no rebound or rigidity, no guarding  Extremities: no cyanosis, no clubbing, no edema  Neuro:  Grossly nonfocal.  Skin:  No rash.    Lab Results   Component Value Date    SODIUM 140 06/28/2024    K 3.7 06/28/2024     06/28/2024    CO2 31 06/28/2024    BUN 17 06/28/2024    CREATININE 0.62 06/28/2024    GLUC 104 10/18/2022    CALCIUM 9.1 06/28/2024           "

## 2025-01-17 ENCOUNTER — TELEPHONE (OUTPATIENT)
Age: 80
End: 2025-01-17

## 2025-01-17 DIAGNOSIS — I27.21 PAH (PULMONARY ARTERY HYPERTENSION) (HCC): ICD-10-CM

## 2025-01-17 DIAGNOSIS — R63.4 ABNORMAL WEIGHT LOSS: Chronic | ICD-10-CM

## 2025-01-17 RX ORDER — MIRTAZAPINE 15 MG/1
15 TABLET, FILM COATED ORAL
Qty: 90 TABLET | Refills: 0 | Status: SHIPPED | OUTPATIENT
Start: 2025-01-17

## 2025-01-17 RX ORDER — SILDENAFIL CITRATE 20 MG/1
20 TABLET ORAL DAILY
Qty: 90 TABLET | Refills: 1 | Status: SHIPPED | OUTPATIENT
Start: 2025-01-17 | End: 2025-01-22 | Stop reason: SDUPTHER

## 2025-01-17 NOTE — TELEPHONE ENCOUNTER
Medication: Sildenafil 20 mg     Dose/Frequency: Half  tablet 3 times daily     Quantity: 90    Pharmacy: [facility], [address] [place]     Office:   [] PCP/Provider -   [x] Speciality/Provider -     Does the patient have enough for 3 days?   [] Yes   [x] No - Send as HP to POD

## 2025-01-17 NOTE — TELEPHONE ENCOUNTER
PA for  SUBMITTED to      via   [id number]  [x]CMM-KEY:    []Surescripts-Case ID #    []Availity-Auth ID #  NDC #     []Faxed to plan   []Other website    []Phone call Case ID #      [x]PA sent as URGENT    All office notes, labs and other pertaining documents and studies sent. Clinical questions answered. Awaiting determination from insurance company.     Turnaround time for your insurance to make a decision on your Prior Authorization can take 7-21 business days.

## 2025-01-20 DIAGNOSIS — G47.00 INSOMNIA, UNSPECIFIED TYPE: ICD-10-CM

## 2025-01-20 DIAGNOSIS — R63.4 ABNORMAL WEIGHT LOSS: Chronic | ICD-10-CM

## 2025-01-20 NOTE — TELEPHONE ENCOUNTER
PA for sildenafil 20 APPROVED     Date(s) approved 1/15/2026        Patient advised by          [x]MyChart Message  []Phone call   [x]LMOM  []L/M to call office as no active Communication consent on file  []Unable to leave detailed message as VM not approved on Communication consent       Pharmacy advised by    [x]Fax  []Phone call    Approval letter scanned into Media No    KRISTOPHER RAJPUT (Key: [id number])  PA Case ID #: [id number]  Need Help? Call us at [phone]  Outcome  Approved on January 17 by [employer] 2017  Meets Pharmacy Policy  Authorization Expiration Date: 1/15/2026

## 2025-01-20 NOTE — TELEPHONE ENCOUNTER
Last visit 12/20/2024  No upcoming appt    Patient requesting a refill for mirtazapine (remeron) and temazepam (restoril) at the [facility] pharmacy on [address]. Please advise.

## 2025-01-21 ENCOUNTER — TELEPHONE (OUTPATIENT)
Dept: CARDIOLOGY CLINIC | Facility: CLINIC | Age: 80
End: 2025-01-21

## 2025-01-21 DIAGNOSIS — R63.4 ABNORMAL WEIGHT LOSS: Primary | Chronic | ICD-10-CM

## 2025-01-21 DIAGNOSIS — I27.21 PAH (PULMONARY ARTERY HYPERTENSION) (HCC): ICD-10-CM

## 2025-01-21 RX ORDER — MIRTAZAPINE 15 MG/1
15 TABLET, FILM COATED ORAL
Qty: 90 TABLET | Refills: 0 | OUTPATIENT
Start: 2025-01-21

## 2025-01-21 NOTE — TELEPHONE ENCOUNTER
You sent script for sildenafil 20 mg qd.  Did you want to decrease pt's dose of 10 mg tid?    Please advise

## 2025-01-22 DIAGNOSIS — I27.21 PAH (PULMONARY ARTERY HYPERTENSION) (HCC): ICD-10-CM

## 2025-01-22 RX ORDER — SILDENAFIL CITRATE 20 MG/1
10 TABLET ORAL 3 TIMES DAILY
Qty: 45 TABLET | Refills: 1 | Status: SHIPPED | OUTPATIENT
Start: 2025-01-22

## 2025-01-22 NOTE — TELEPHONE ENCOUNTER
Patient of: Dorcas bueno  At office: Linnette DOMÍNGUEZ  Calling about : Medication refill temazepam (restoril , patient warm transferred to practice/ clinical (RAFFI).

## 2025-01-24 RX ORDER — TEMAZEPAM 30 MG/1
30 CAPSULE ORAL
Qty: 60 CAPSULE | Refills: 0 | Status: SHIPPED | OUTPATIENT
Start: 2025-01-24

## 2025-01-25 DIAGNOSIS — N13.8 BPH WITH OBSTRUCTION/LOWER URINARY TRACT SYMPTOMS: ICD-10-CM

## 2025-01-25 DIAGNOSIS — N40.1 BPH WITH OBSTRUCTION/LOWER URINARY TRACT SYMPTOMS: ICD-10-CM

## 2025-01-27 RX ORDER — TAMSULOSIN HYDROCHLORIDE 0.4 MG/1
0.8 CAPSULE ORAL
Qty: 180 CAPSULE | Refills: 1 | Status: SHIPPED | OUTPATIENT
Start: 2025-01-27

## 2025-02-03 DIAGNOSIS — N40.1 BPH WITH OBSTRUCTION/LOWER URINARY TRACT SYMPTOMS: ICD-10-CM

## 2025-02-03 DIAGNOSIS — I27.21 PAH (PULMONARY ARTERY HYPERTENSION) (HCC): ICD-10-CM

## 2025-02-03 DIAGNOSIS — N13.8 BPH WITH OBSTRUCTION/LOWER URINARY TRACT SYMPTOMS: ICD-10-CM

## 2025-02-03 RX ORDER — SILDENAFIL CITRATE 20 MG/1
10 TABLET ORAL 3 TIMES DAILY
Qty: 45 TABLET | Refills: 5 | Status: SHIPPED | OUTPATIENT
Start: 2025-02-03

## 2025-02-04 ENCOUNTER — TELEPHONE (OUTPATIENT)
Age: 80
End: 2025-02-04

## 2025-02-04 RX ORDER — FINASTERIDE 5 MG/1
5 TABLET, FILM COATED ORAL DAILY
Qty: 90 TABLET | Refills: 1 | Status: SHIPPED | OUTPATIENT
Start: 2025-02-04

## 2025-02-04 NOTE — TELEPHONE ENCOUNTER
Can we please schedule pt for follow up as he has not been seen in two years. I will provide courtesy refill. Thank you

## 2025-02-04 NOTE — TELEPHONE ENCOUNTER
Called patient - he is told that a courtesy refill was sent to the pharmacy and we will not fill any more until he is seen.  He will call back to schedule.

## 2025-02-04 NOTE — TELEPHONE ENCOUNTER
Patient called in stating he needs to have a form filled out prior to his cataract procedure   Advised Patient to upload form to portal and we can re upload once filled out or fax to where it needs to be sent.    Patient verbalized understanding.

## 2025-02-20 ENCOUNTER — RA CDI HCC (OUTPATIENT)
Dept: OTHER | Facility: HOSPITAL | Age: 80
End: 2025-02-20

## 2025-02-26 ENCOUNTER — CONSULT (OUTPATIENT)
Dept: FAMILY MEDICINE CLINIC | Facility: CLINIC | Age: 80
End: 2025-02-26
Payer: MEDICARE

## 2025-02-26 VITALS
TEMPERATURE: 97.9 F | HEIGHT: 77 IN | SYSTOLIC BLOOD PRESSURE: 116 MMHG | BODY MASS INDEX: 18.87 KG/M2 | DIASTOLIC BLOOD PRESSURE: 60 MMHG | WEIGHT: 159.8 LBS

## 2025-02-26 DIAGNOSIS — I27.20 PULMONARY HYPERTENSION (HCC): Chronic | ICD-10-CM

## 2025-02-26 DIAGNOSIS — J96.11 CHRONIC HYPOXEMIC RESPIRATORY FAILURE (HCC): ICD-10-CM

## 2025-02-26 DIAGNOSIS — H26.9 CATARACT OF LEFT EYE, UNSPECIFIED CATARACT TYPE: ICD-10-CM

## 2025-02-26 DIAGNOSIS — Z01.818 PRE-OP EXAMINATION: Primary | ICD-10-CM

## 2025-02-26 PROBLEM — I25.10 CORONARY ARTERY CALCIFICATION SEEN ON CT SCAN: Chronic | Status: ACTIVE | Noted: 2019-07-01

## 2025-02-26 PROCEDURE — 99214 OFFICE O/P EST MOD 30 MIN: CPT

## 2025-02-26 NOTE — PROGRESS NOTES
"Pulmonary Hypertension Outpatient Visit    Pilo Manriquez 79 y.o. male   MRN: [id number]  Encounter: [id number]    Assessment:  Patient Active Problem List    Diagnosis Date Noted    Chronic hypoxemic respiratory failure (HCC) 09/01/2023    Mild protein-calorie malnutrition (HCC) 09/01/2023    Dyspnea on minimal exertion 03/08/2023    Ambulatory dysfunction 03/08/2023    Primary insomnia 10/31/2022    Abnormal weight loss 10/31/2022    Pulmonary hypertension (HCC) 10/18/2022    ILD (interstitial lung disease) (HCC) 10/07/2022    Coronary artery calcification seen on CT scan 07/2019    BPH with obstruction/lower urinary tract symptoms 06/13/2018    Combined arterial insufficiency and corporo-venous occlusive erectile dysfunction 06/13/2018       Today's Plan:  Continue current medical therapy for PAH.  Reviewed recent TTE results.  Patient and family now primarily focused on symptom management for his chronic lung disease. Continues to closely follow with palliative care.  RTC in 6 months in PH clinic.    Plan:  Pulmonary arterial hypertension   Etiology: \"ILD- PH with predominance of PAH over ILD; precapillary PAH.\"   Weight of 171 lbs on 09/24. Today, weighs 159 lbs.      PH-specific therapies:   --PDE5 inhibitor: sildenafil 10 mg TID.   --Prostacyclin analogue: Tyvaso DPI 64 mcg QID.    --Supplemental oxygen: 6-8 L/min.      Volume Management:   --Diuretic: none.     Coronary artery calcifications on CT imaging  Interstitial lung disease / pulmonary fibrosis: Follows with Dr. Aguilar as outpatient.  Obstructive sleep apnea  History of tobacco abuse    HPI:   Pilo Manriquez is a 79-year-old man with a PMH as above who presents to the office for follow-up. Follows with Dr. Holt.     09/24/2024 with DA: \"RV and NT proBNP at goal  ILD per Dr Aguilar  6 min walk deferred  NT proBNP at lower risk  No change in current PAH meds  A little intravascularly dry, bring in more fluid.\" Repeat TTE ordered.     02/27/2025: Patient " "presents with his daughter, Michelle, for follow-up.  Feeling okay today.  However, patient reports over the past few months that simple ADLs have become progressively harder to complete and requiring more frequent breaks. Additionally, patient's oxygen requirement has increased to 6-8 L/min at the recommendation of his pulmonologist.  Reviewed recent TTE results.  Patient now utilizing wheelchair as needed when out of the house. Also has aide in the home for a few hours to assist him.   Patient states that he and his family \"know what's going on\" and are aware of his prognosis and have been anticipating a gradual decline in his functional status.  Needs with decreased appetite and reduced oral intake (down >10 lbs since visit in 09/2024).     Past Medical History:   Diagnosis Date    Bifascicular block 03/08/2020    Asx: discovered on Ekg done 3/20 in eval of chest pain.     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   Sometimes can skip a couple years and sometimes can have it twice a year.  This episode is different from the others because it has been a week and the blisters to not look like they are starting to dry up as they usually do.  Uses no medication for these outbreaks.      Left anterior hemiblock 03/08/2020    Asx: discovered on Ekg done 3/20 in eval of chest pain.       ROSALINA (obstructive sleep apnea)     Other hemorrhoids 03/05/2019    Recurrent       Other specified anemias 02/27/2023    New 2/23; N chromic     Normal on repeat 6 mos later       Personal history of COVID-19 07/07/2022    >>OVERVIEW FOR COVID-19 WRITTEN ON " 7/7/2022  4:17 PM BY ADRIANNE MENDOZA MD     May 23, 2022.        Review of Systems   Constitutional:  Positive for appetite change (decreased) and fatigue. Negative for activity change, chills, fever and unexpected weight change.   Respiratory:  Positive for shortness of breath. Negative for cough and chest tightness.    Cardiovascular:  Negative for chest pain, palpitations and leg swelling.   Gastrointestinal:  Negative for abdominal distention.   Neurological:  Positive for weakness. Negative for dizziness, syncope and light-headedness.   Psychiatric/Behavioral:  Negative for confusion and sleep disturbance. The patient is not nervous/anxious.      No Known Allergies      Current Outpatient Medications:     ciclopirox (LOPROX) 0.77 % cream, PLEASE SEE ATTACHED FOR DETAILED DIRECTIONS, Disp: , Rfl:     finasteride (PROSCAR) 5 mg tablet, TAKE 1 TABLET BY MOUTH EVERY DAY, Disp: 90 tablet, Rfl: 1    FLUoxetine (PROzac) 20 mg capsule, Take 1 capsule (20 mg total) by mouth daily, Disp: 90 capsule, Rfl: 3    mirtazapine (REMERON) 15 mg tablet, TAKE 1 TABLET BY MOUTH DAILY AT BEDTIME, Disp: 90 tablet, Rfl: 0    Multiple Vitamin (MULTI-DAY VITAMINS) TABS, Take 1 tablet by mouth daily, Disp: , Rfl:     Ofev 150 MG CAPS capsule, TAKE 1 CAPSULE BY MOUTH TWICE DAILY WITH FOOD., Disp: 60 capsule, Rfl: 11    sildenafil (REVATIO) 20 mg tablet, Take 0.5 tablets (10 mg total) by mouth 3 (three) times a day, Disp: 45 tablet, Rfl: 5    tamsulosin (FLOMAX) 0.4 mg, TAKE 2 CAPSULES BY MOUTH DAILY WITH DINNER.., Disp: 180 capsule, Rfl: 1    temazepam (RESTORIL) 30 mg capsule, Take 1 capsule (30 mg total) by mouth daily at bedtime, Disp: 60 capsule, Rfl: 0    Treprostinil (Tyvaso DPI Titration Kit) 16 & 32 & 48 MCG POWD, Inhale 32 mcg 4 (four) times a day Titrate up by 16 mcg every one- two weeks as tolerated to max of 64 mcg 4x's daily, Disp: 252 each, Rfl: 2    Treprostinil 64 MCG POWD, Inhale 64 mcg 4 (four) times a day, Disp: 112  "each, Rfl: 11    liver oil-zinc oxide (Boudreauxs Butt Paste) 40 % ointment, Apply topically as needed for irritation (Patient not taking: Reported on 1/15/2025), Disp: 56 g, Rfl: 0    Social History     Socioeconomic History    Marital status:      Spouse name: Not on file    Number of children: Not on file    Years of education: Not on file    Highest education level: Not on file   Occupational History    Not on file   Tobacco Use    Smoking status: Former     Current packs/day: 0.00     Average packs/day: 0.5 packs/day for 5.0 years (2.5 ttl pk-yrs)     Types: Cigarettes     Start date:      Quit date:      Years since quittin.1     Passive exposure: Past    Smokeless tobacco: Never   Vaping Use    Vaping status: Never Used   Substance and Sexual Activity    Alcohol use: Not Currently    Drug use: No    Sexual activity: Not Currently   Other Topics Concern    Not on file   Social History Narrative    Not on file     Social Drivers of Health     Financial Resource Strain: Low Risk  (2023)    Overall Financial Resource Strain (CARDIA)     Difficulty of Paying Living Expenses: Not very hard   Food Insecurity: No Food Insecurity (2024)    Hunger Vital Sign     Worried About Running Out of Food in the Last Year: Never true     Ran Out of Food in the Last Year: Never true   Transportation Needs: No Transportation Needs (2024)    PRAPARE - Transportation     Lack of Transportation (Medical): No     Lack of Transportation (Non-Medical): No   Physical Activity: Not on file   Stress: Not on file   Social Connections: Not on file   Intimate Partner Violence: Not on file   Housing Stability: Low Risk  (2024)    Housing Stability Vital Sign     Unable to Pay for Housing in the Last Year: No     Number of Times Moved in the Last Year: 0     Homeless in the Last Year: No     Family History   Family history unknown: Yes       Vitals:   Blood pressure 112/62, height 6' 5\" (1.956 m), SpO2 " "90%.    Wt Readings from Last 10 Encounters:   02/26/25 72.5 kg (159 lb 12.8 oz)   01/15/25 75.8 kg (167 lb)   12/20/24 71.1 kg (156 lb 12.8 oz)   11/12/24 74.6 kg (164 lb 6.4 oz)   10/16/24 75.8 kg (167 lb)   10/09/24 75.8 kg (167 lb 3.2 oz)   09/24/24 77.6 kg (171 lb)   09/05/24 78.9 kg (174 lb)   07/10/24 78.9 kg (174 lb)   06/20/24 79.9 kg (176 lb 3.2 oz)     Vitals:    02/27/25 1133   BP: 112/62   BP Location: Left arm   Patient Position: Sitting   Cuff Size: Standard   SpO2: 90%   Height: 6' 5\" (1.956 m)       Physical Exam  Vitals reviewed.   Constitutional:       General: He is awake. He is not in acute distress.     Appearance: Normal appearance. He is well-developed. He is ill-appearing. He is not toxic-appearing or diaphoretic.      Interventions: Nasal cannula in place.      Comments: Seated in wheelchair   HENT:      Head: Normocephalic.      Nose: Nose normal.   Neck:      Vascular: No JVD.   Cardiovascular:      Rate and Rhythm: Normal rate and regular rhythm.      Heart sounds: No murmur heard.  Pulmonary:      Effort: Pulmonary effort is normal. No tachypnea, bradypnea or respiratory distress.      Breath sounds: Decreased air movement present. Decreased breath sounds present. No rales.   Abdominal:      General: There is no distension.   Musculoskeletal:      Cervical back: Neck supple.      Right lower leg: No edema.      Left lower leg: No edema.   Skin:     General: Skin is warm and dry.      Coloration: Skin is not jaundiced or pale.   Neurological:      Mental Status: He is alert and oriented to person, place, and time.   Psychiatric:         Attention and Perception: Attention normal.         Mood and Affect: Mood and affect normal.         Speech: Speech normal.         Behavior: Behavior normal. Behavior is cooperative.         Thought Content: Thought content normal.       Labs & Results:  Lab Results   Component Value Date    WBC 6.70 01/15/2025    HGB 12.6 01/15/2025    HCT 40.6 " "01/15/2025     (H) 01/15/2025     01/15/2025     Lab Results   Component Value Date    SODIUM 137 01/15/2025    K 4.0 01/15/2025    CL 98 01/15/2025    CO2 34 (H) 01/15/2025    BUN 19 01/15/2025    CREATININE 0.70 01/15/2025    GLUC 109 01/15/2025    CALCIUM 9.3 01/15/2025     No results found for: \"INR\", \"PROTIME\"    Lab Results   Component Value Date    BNP 82 10/17/2023      Carolina Pagan PA-C  "

## 2025-02-26 NOTE — PROGRESS NOTES
[facility] PRE-OPERATIVE EVALUATION  [facility] - [facility]    NAME: Pilo Manriquez  AGE: 79 y.o. SEX: male  : 1945     DATE: 2025    [facility] Pre-Operative Evaluation      Chief Complaint: Pre-operative Evaluation     Surgery: Left eye cataract   Anticipated Date of Surgery: 25  Referring Provider: Dr. Diego      History of Present Illness:     Pilo Manriquez is a 79 y.o. male who presents to the office today for a preoperative consultation at the request of surgeon, Dr. Diego, who plans on performing Left eye cataract  on 25. Planned anesthesia is  MAC/Topical . Patient has a bleeding risk of: no recent abnormal bleeding. Patient does not have objections to receiving blood products if needed. Current anti-platelet/anti-coagulation medications that the patient is prescribed includes:  none .      Assessment of Chronic Conditions:   - COPD: Chronic hypoxic respiratory failure, ILD, ROSALINA   - Hypertension: Pulmonary HTN     Assessment of Cardiac Risk:  Denies unstable or severe angina or MI in the last 6 weeks or history of stent placement in the last year   Denies decompensated heart failure (e.g. New onset heart failure, NYHA functional class IV heart failure, or worsening existing heart failure)  Denies significant arrhythmias such as high grade AV block, symptomatic ventricular arrhythmia, newly recognized ventricular tachycardia, supraventricular tachycardia with resting heart rate >100, or symptomatic bradycardia  Denies severe heart valve disease including aortic stenosis or symptomatic mitral stenosis     Exercise Capacity:  Able to walk 4 blocks without symptoms?:  No, chronic hypoxic respiratory failure   Able to walk 2 flights without symptoms?: No, chronic hypoxic respiratory failure     Prior Anesthesia Reactions: No     Personal history of venous thromboembolic disease? No    History of steroid use for >2 weeks within last  year? No         Review of Systems:     Review of Systems   Constitutional:  Negative for activity change, chills, fatigue and fever.   HENT:  Negative for congestion, ear pain, rhinorrhea and sore throat.    Eyes:  Negative for pain and visual disturbance.   Respiratory:  Negative for cough, chest tightness and shortness of breath.    Cardiovascular:  Negative for chest pain, palpitations and leg swelling.   Gastrointestinal:  Negative for abdominal pain, constipation, diarrhea, nausea and vomiting.   Genitourinary:  Negative for decreased urine volume, dysuria, frequency, hematuria and urgency.   Musculoskeletal:  Negative for arthralgias and back pain.   Skin:  Negative for color change and rash.   Neurological:  Negative for seizures, syncope and headaches.   Psychiatric/Behavioral:  Negative for dysphoric mood. The patient is not nervous/anxious.    All other systems reviewed and are negative.      Current Problem List:     Patient Active Problem List   Diagnosis    BPH with obstruction/lower urinary tract symptoms    Combined arterial insufficiency and corporo-venous occlusive erectile dysfunction    ILD (interstitial lung disease) (HCC)    Pulmonary hypertension (HCC)    Primary insomnia    Abnormal weight loss    Dyspnea on minimal exertion    Ambulatory dysfunction    Chronic hypoxemic respiratory failure (HCC)    Mild protein-calorie malnutrition (HCC)    Coronary artery calcification seen on CT scan       Allergies:     No Known Allergies    Current Medications:       Current Outpatient Medications:     ciclopirox (LOPROX) 0.77 % cream, PLEASE SEE ATTACHED FOR DETAILED DIRECTIONS, Disp: , Rfl:     finasteride (PROSCAR) 5 mg tablet, TAKE 1 TABLET BY MOUTH EVERY DAY, Disp: 90 tablet, Rfl: 1    FLUoxetine (PROzac) 20 mg capsule, Take 1 capsule (20 mg total) by mouth daily, Disp: 90 capsule, Rfl: 3    mirtazapine (REMERON) 15 mg tablet, TAKE 1 TABLET BY MOUTH DAILY AT BEDTIME, Disp: 90 tablet, Rfl: 0     Multiple Vitamin (MULTI-DAY VITAMINS) TABS, Take 1 tablet by mouth daily, Disp: , Rfl:     Ofev 150 MG CAPS capsule, TAKE 1 CAPSULE BY MOUTH TWICE DAILY WITH FOOD., Disp: 60 capsule, Rfl: 11    sildenafil (REVATIO) 20 mg tablet, Take 0.5 tablets (10 mg total) by mouth 3 (three) times a day, Disp: 45 tablet, Rfl: 5    tamsulosin (FLOMAX) 0.4 mg, TAKE 2 CAPSULES BY MOUTH DAILY WITH DINNER.., Disp: 180 capsule, Rfl: 1    temazepam (RESTORIL) 30 mg capsule, Take 1 capsule (30 mg total) by mouth daily at bedtime, Disp: 60 capsule, Rfl: 0    Treprostinil (Tyvaso DPI Titration Kit) 16 & 32 & 48 MCG POWD, Inhale 32 mcg 4 (four) times a day Titrate up by 16 mcg every one- two weeks as tolerated to max of 64 mcg 4x's daily, Disp: 252 each, Rfl: 2    Treprostinil 64 MCG POWD, Inhale 64 mcg 4 (four) times a day, Disp: 112 each, Rfl: 11    liver oil-zinc oxide (Boudreauxs Butt Paste) 40 % ointment, Apply topically as needed for irritation (Patient not taking: Reported on 2/26/2025), Disp: 56 g, Rfl: 0    Past Medical History:       Past Medical History:   Diagnosis Date    Bifascicular block 03/08/2020    Asx: discovered on Ekg done 3/20 in eval of chest pain.     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   Sometimes can skip a couple years and sometimes can have it twice a year.  This episode is different from the others because it has been a week and the blisters to not look like they are starting to dry up as they usually do.  Uses no medication for these outbreaks.      Left anterior hemiblock 03/08/2020    Asx: discovered on Ekg done 3/20 in  eval of chest pain.       ROSALINA (obstructive sleep apnea)     Other hemorrhoids 2019    Recurrent       Other specified anemias 2023    New ; N chromic     Normal on repeat 6 mos later       Personal history of COVID-19 2022    >>OVERVIEW FOR COVID-19 WRITTEN ON 2022  4:17 PM BY ADRIANNE MENDOZA MD     May 23, 2022.           Past Surgical History:   Procedure Laterality Date    CARDIAC CATHETERIZATION N/A 10/18/2022    Procedure: Cardiac RHC;  Surgeon: Laureano Holt DO;  Location: [facility];  Service: Cardiology    COLONOSCOPY      SKIN BIOPSY          Family History   Family history unknown: Yes        Social History     Socioeconomic History    Marital status:      Spouse name: Not on file    Number of children: Not on file    Years of education: Not on file    Highest education level: Not on file   Occupational History    Not on file   Tobacco Use    Smoking status: Former     Current packs/day: 0.00     Average packs/day: 0.5 packs/day for 5.0 years (2.5 ttl pk-yrs)     Types: Cigarettes     Start date:      Quit date:      Years since quittin.1     Passive exposure: Past    Smokeless tobacco: Never   Vaping Use    Vaping status: Never Used   Substance and Sexual Activity    Alcohol use: Not Currently    Drug use: No    Sexual activity: Not Currently   Other Topics Concern    Not on file   Social History Narrative    Not on file     Social Drivers of Health     Financial Resource Strain: Low Risk  (2023)    Overall Financial Resource Strain (CARDIA)     Difficulty of Paying Living Expenses: Not very hard   Food Insecurity: No Food Insecurity (2024)    Hunger Vital Sign     Worried About Running Out of Food in the Last Year: Never true     Ran Out of Food in the Last Year: Never true   Transportation Needs: No Transportation Needs (2024)    PRAPARE - Transportation     Lack of Transportation (Medical): No     Lack of Transportation  "(Non-Medical): No   Physical Activity: Not on file   Stress: Not on file   Social Connections: Not on file   Intimate Partner Violence: Not on file   Housing Stability: Low Risk  (12/20/2024)    Housing Stability Vital Sign     Unable to Pay for Housing in the Last Year: No     Number of Times Moved in the Last Year: 0     Homeless in the Last Year: No        Physical Exam:     /60 (BP Location: Left arm, Patient Position: Sitting, Cuff Size: Adult)   Temp 97.9 °F (36.6 °C) (Temporal)   Ht 6' 5\" (1.956 m)   Wt 72.5 kg (159 lb 12.8 oz)   BMI 18.95 kg/m²     Physical Exam  Vitals reviewed.   Constitutional:       Appearance: Normal appearance. He is normal weight.   HENT:      Head: Normocephalic.      Right Ear: Tympanic membrane, ear canal and external ear normal.      Left Ear: Tympanic membrane, ear canal and external ear normal.      Nose: Nose normal.      Mouth/Throat:      Mouth: Mucous membranes are moist.      Pharynx: Oropharynx is clear.   Eyes:      Extraocular Movements: Extraocular movements intact.      Conjunctiva/sclera: Conjunctivae normal.      Pupils: Pupils are equal, round, and reactive to light.   Cardiovascular:      Rate and Rhythm: Normal rate and regular rhythm.      Pulses: Normal pulses.      Heart sounds: Normal heart sounds.   Pulmonary:      Effort: Pulmonary effort is normal.      Breath sounds: Normal breath sounds.   Abdominal:      General: Bowel sounds are normal.      Palpations: Abdomen is soft.   Musculoskeletal:         General: Normal range of motion.      Cervical back: Normal range of motion.   Skin:     General: Skin is warm.      Capillary Refill: Capillary refill takes less than 2 seconds.   Neurological:      General: No focal deficit present.      Mental Status: He is alert and oriented to person, place, and time. Mental status is at baseline.   Psychiatric:         Mood and Affect: Mood normal.         Behavior: Behavior normal.         Thought Content: " Thought content normal.         Judgment: Judgment normal.          Data:     Pre-operative work-up    Laboratory Results: I have personally reviewed the pertinent laboratory results/reports      EKG: Results Review Statement: No pertinent imaging studies reviewed.    Chest x-ray: Results Review Statement: No pertinent imaging studies reviewed.      Previous cardiopulmonary studies within the past year:  Echocardiogram: 10/2024 EF around 52%.   Cardiac Catheterization: n/a  Stress Test: n/a  Pulmonary Function Testing: n/a      Assessment & Recommendations:     1. Pre-op examination        2. Cataract of left eye, unspecified cataract type        3. Chronic hypoxemic respiratory failure (HCC)        4. Pulmonary hypertension (HCC)            Pre-Op Evaluation Assessment  79 y.o. male with planned surgery: Left eye cataract .    Known risk factors for perioperative complications: Chronic pulmonary disease.        Current medications which may produce withdrawal symptoms if withheld perioperatively: none.    Pre-Op Evaluation Plan  1. Further preoperative workup as follows:   - None; no further preoperative work-up is required    2. Medication Management/Recommendations:   - None, continue medication regimen including morning of surgery, with sip of water  - Patient has been instructed to avoid herbs or non-directed vitamins the week prior to surgery to ensure no drug interactions with perioperative surgical and anesthetic medications.  - Patient has been instructed to avoid aspirin containing medications or non-steroidal anti-inflammatory drugs for the week preceding surgery.    3. Prophylaxis for cardiac events with perioperative beta-blockers: not indicated.    4. Patient requires further consultation with: None    Clearance  Patient is CLEARED for surgery without any additional cardiac testing.     ONIEL Chavira  [address] [address]  Phone#   [phone]  Fax#  [phone]

## 2025-02-27 ENCOUNTER — OFFICE VISIT (OUTPATIENT)
Dept: CARDIOLOGY CLINIC | Facility: CLINIC | Age: 80
End: 2025-02-27
Payer: MEDICARE

## 2025-02-27 VITALS
OXYGEN SATURATION: 90 % | BODY MASS INDEX: 18.95 KG/M2 | SYSTOLIC BLOOD PRESSURE: 112 MMHG | DIASTOLIC BLOOD PRESSURE: 62 MMHG | HEIGHT: 77 IN

## 2025-02-27 DIAGNOSIS — I27.20 PULMONARY HYPERTENSION (HCC): Primary | Chronic | ICD-10-CM

## 2025-02-27 DIAGNOSIS — J96.11 CHRONIC RESPIRATORY FAILURE WITH HYPOXIA (HCC): ICD-10-CM

## 2025-02-27 DIAGNOSIS — J84.9 ILD (INTERSTITIAL LUNG DISEASE) (HCC): Chronic | ICD-10-CM

## 2025-02-27 DIAGNOSIS — I25.10 CORONARY ARTERY CALCIFICATION SEEN ON CT SCAN: Chronic | ICD-10-CM

## 2025-02-27 PROCEDURE — 99214 OFFICE O/P EST MOD 30 MIN: CPT | Performed by: PHYSICIAN ASSISTANT

## (undated) DEVICE — PINNACLE INTRODUCER SHEATH: Brand: PINNACLE

## (undated) DEVICE — MICROPUNCTURE INTRODUCER SET SILHOUETTE TRANSITIONLESS PUSH-PLUS DESIGN - STIFFENED CANNULA WITH NITINOL WIRE GUIDE: Brand: MICROPUNCTURE

## (undated) DEVICE — CATH SWAN GANZ MON 7FR 110CM